# Patient Record
Sex: FEMALE | Race: OTHER | HISPANIC OR LATINO | Employment: FULL TIME | URBAN - METROPOLITAN AREA
[De-identification: names, ages, dates, MRNs, and addresses within clinical notes are randomized per-mention and may not be internally consistent; named-entity substitution may affect disease eponyms.]

---

## 2017-04-19 ENCOUNTER — ALLSCRIPTS OFFICE VISIT (OUTPATIENT)
Dept: OTHER | Facility: OTHER | Age: 47
End: 2017-04-19

## 2017-05-26 ENCOUNTER — ALLSCRIPTS OFFICE VISIT (OUTPATIENT)
Dept: OTHER | Facility: OTHER | Age: 47
End: 2017-05-26

## 2017-06-25 ENCOUNTER — APPOINTMENT (EMERGENCY)
Dept: RADIOLOGY | Facility: HOSPITAL | Age: 47
End: 2017-06-25
Payer: COMMERCIAL

## 2017-06-25 ENCOUNTER — HOSPITAL ENCOUNTER (EMERGENCY)
Facility: HOSPITAL | Age: 47
Discharge: HOME | End: 2017-06-25
Attending: EMERGENCY MEDICINE | Admitting: EMERGENCY MEDICINE
Payer: COMMERCIAL

## 2017-06-25 VITALS
RESPIRATION RATE: 16 BRPM | DIASTOLIC BLOOD PRESSURE: 95 MMHG | TEMPERATURE: 98.5 F | HEART RATE: 80 BPM | HEIGHT: 63 IN | OXYGEN SATURATION: 100 % | SYSTOLIC BLOOD PRESSURE: 131 MMHG | BODY MASS INDEX: 30.74 KG/M2 | WEIGHT: 173.5 LBS

## 2017-06-25 DIAGNOSIS — S80.01XA CONTUSION OF RIGHT KNEE, INITIAL ENCOUNTER: ICD-10-CM

## 2017-06-25 DIAGNOSIS — S89.91XA RIGHT KNEE INJURY, INITIAL ENCOUNTER: Primary | ICD-10-CM

## 2017-06-25 PROCEDURE — 99283 EMERGENCY DEPT VISIT LOW MDM: CPT

## 2017-06-25 PROCEDURE — 73564 X-RAY EXAM KNEE 4 OR MORE: CPT

## 2017-06-25 RX ORDER — HYDROCODONE BITARTRATE AND ACETAMINOPHEN 5; 325 MG/1; MG/1
1 TABLET ORAL EVERY 6 HOURS PRN
Qty: 20 TABLET | Refills: 0 | Status: SHIPPED | OUTPATIENT
Start: 2017-06-25 | End: 2017-06-30

## 2017-06-25 RX ORDER — ACETAMINOPHEN 325 MG/1
650 TABLET ORAL EVERY 6 HOURS PRN
COMMUNITY
End: 2019-02-01

## 2017-06-25 RX ORDER — NAPROXEN 250 MG/1
250 TABLET ORAL 2 TIMES DAILY WITH MEALS
COMMUNITY
End: 2019-02-01 | Stop reason: ALTCHOICE

## 2017-06-25 RX ORDER — HYDROCODONE BITARTRATE AND ACETAMINOPHEN 5; 325 MG/1; MG/1
1 TABLET ORAL ONCE
Status: COMPLETED | OUTPATIENT
Start: 2017-06-25 | End: 2017-06-25

## 2017-06-25 RX ADMIN — HYDROCODONE BITARTRATE AND ACETAMINOPHEN 1 TABLET: 5; 325 TABLET ORAL at 02:17

## 2017-07-20 ENCOUNTER — ALLSCRIPTS OFFICE VISIT (OUTPATIENT)
Dept: OTHER | Facility: OTHER | Age: 47
End: 2017-07-20

## 2017-07-20 DIAGNOSIS — M17.11 PRIMARY OSTEOARTHRITIS OF RIGHT KNEE: ICD-10-CM

## 2017-07-28 ENCOUNTER — APPOINTMENT (OUTPATIENT)
Dept: PHYSICAL THERAPY | Age: 47
End: 2017-07-28
Payer: COMMERCIAL

## 2017-07-28 ENCOUNTER — GENERIC CONVERSION - ENCOUNTER (OUTPATIENT)
Dept: OTHER | Facility: OTHER | Age: 47
End: 2017-07-28

## 2017-07-28 DIAGNOSIS — M17.11 PRIMARY OSTEOARTHRITIS OF RIGHT KNEE: ICD-10-CM

## 2017-07-28 PROCEDURE — 97162 PT EVAL MOD COMPLEX 30 MIN: CPT

## 2017-08-04 ENCOUNTER — APPOINTMENT (OUTPATIENT)
Dept: PHYSICAL THERAPY | Age: 47
End: 2017-08-04
Payer: COMMERCIAL

## 2017-08-04 PROCEDURE — 97110 THERAPEUTIC EXERCISES: CPT

## 2017-08-10 ENCOUNTER — APPOINTMENT (OUTPATIENT)
Dept: PHYSICAL THERAPY | Age: 47
End: 2017-08-10
Payer: COMMERCIAL

## 2017-08-10 PROCEDURE — 97035 APP MDLTY 1+ULTRASOUND EA 15: CPT

## 2017-08-10 PROCEDURE — 97530 THERAPEUTIC ACTIVITIES: CPT

## 2017-08-10 PROCEDURE — 97110 THERAPEUTIC EXERCISES: CPT

## 2017-08-11 ENCOUNTER — APPOINTMENT (OUTPATIENT)
Dept: PHYSICAL THERAPY | Age: 47
End: 2017-08-11
Payer: COMMERCIAL

## 2017-08-11 PROCEDURE — 97110 THERAPEUTIC EXERCISES: CPT

## 2017-08-11 PROCEDURE — 97530 THERAPEUTIC ACTIVITIES: CPT

## 2017-08-17 ENCOUNTER — APPOINTMENT (OUTPATIENT)
Dept: PHYSICAL THERAPY | Age: 47
End: 2017-08-17
Payer: COMMERCIAL

## 2017-08-17 PROCEDURE — 97110 THERAPEUTIC EXERCISES: CPT

## 2017-08-18 ENCOUNTER — APPOINTMENT (OUTPATIENT)
Dept: PHYSICAL THERAPY | Age: 47
End: 2017-08-18
Payer: COMMERCIAL

## 2017-08-18 PROCEDURE — 97530 THERAPEUTIC ACTIVITIES: CPT

## 2017-08-18 PROCEDURE — 97110 THERAPEUTIC EXERCISES: CPT

## 2017-08-24 ENCOUNTER — APPOINTMENT (OUTPATIENT)
Dept: PHYSICAL THERAPY | Age: 47
End: 2017-08-24
Payer: COMMERCIAL

## 2017-08-24 PROCEDURE — 97140 MANUAL THERAPY 1/> REGIONS: CPT

## 2017-08-24 PROCEDURE — 97110 THERAPEUTIC EXERCISES: CPT

## 2017-08-25 ENCOUNTER — APPOINTMENT (OUTPATIENT)
Dept: PHYSICAL THERAPY | Age: 47
End: 2017-08-25
Payer: COMMERCIAL

## 2017-08-25 PROCEDURE — 97530 THERAPEUTIC ACTIVITIES: CPT

## 2017-08-25 PROCEDURE — 97110 THERAPEUTIC EXERCISES: CPT

## 2017-08-31 ENCOUNTER — APPOINTMENT (OUTPATIENT)
Dept: PHYSICAL THERAPY | Age: 47
End: 2017-08-31
Payer: COMMERCIAL

## 2017-08-31 PROCEDURE — 97140 MANUAL THERAPY 1/> REGIONS: CPT

## 2017-08-31 PROCEDURE — 97110 THERAPEUTIC EXERCISES: CPT

## 2017-09-01 ENCOUNTER — APPOINTMENT (OUTPATIENT)
Dept: PHYSICAL THERAPY | Age: 47
End: 2017-09-01
Payer: COMMERCIAL

## 2017-09-07 ENCOUNTER — APPOINTMENT (OUTPATIENT)
Dept: PHYSICAL THERAPY | Age: 47
End: 2017-09-07
Payer: COMMERCIAL

## 2017-09-07 PROCEDURE — 97110 THERAPEUTIC EXERCISES: CPT

## 2017-09-07 PROCEDURE — G8991 OTHER PT/OT GOAL STATUS: HCPCS

## 2017-09-07 PROCEDURE — G8990 OTHER PT/OT CURRENT STATUS: HCPCS

## 2017-09-08 ENCOUNTER — APPOINTMENT (OUTPATIENT)
Dept: PHYSICAL THERAPY | Age: 47
End: 2017-09-08
Payer: COMMERCIAL

## 2017-10-26 ENCOUNTER — ALLSCRIPTS OFFICE VISIT (OUTPATIENT)
Dept: OTHER | Facility: OTHER | Age: 47
End: 2017-10-26

## 2018-01-12 VITALS
HEART RATE: 70 BPM | WEIGHT: 174 LBS | SYSTOLIC BLOOD PRESSURE: 137 MMHG | HEIGHT: 62 IN | DIASTOLIC BLOOD PRESSURE: 83 MMHG | BODY MASS INDEX: 32.02 KG/M2

## 2018-01-12 NOTE — MISCELLANEOUS
Message  Return to work or school:   Christina Rubio is under my professional care  She was seen in my office on 10/26/17   She is able to return to work on  Weight Bearing Status: Full Weight-Bearing  Bro Lee  Signatures   Electronically signed by : Leopoldo Lint , M D ; Oct 31 2017  1:08PM EST                       (Author)    Electronically signed by : Bro Lee, [facility]; Nov 23 2017  9:29AM EST                       (Author)    Electronically signed by :  Leopoldo Lint , M D ; Nov 28 2017  2:12PM EST                       (Author)

## 2018-01-13 VITALS
SYSTOLIC BLOOD PRESSURE: 126 MMHG | OXYGEN SATURATION: 94 % | DIASTOLIC BLOOD PRESSURE: 76 MMHG | BODY MASS INDEX: 32.2 KG/M2 | RESPIRATION RATE: 18 BRPM | WEIGHT: 175 LBS | HEART RATE: 91 BPM | TEMPERATURE: 98.6 F | HEIGHT: 62 IN

## 2018-01-13 VITALS — HEIGHT: 62 IN | DIASTOLIC BLOOD PRESSURE: 88 MMHG | SYSTOLIC BLOOD PRESSURE: 134 MMHG | HEART RATE: 70 BPM

## 2018-01-14 VITALS
SYSTOLIC BLOOD PRESSURE: 110 MMHG | TEMPERATURE: 98.6 F | WEIGHT: 169.38 LBS | HEART RATE: 100 BPM | BODY MASS INDEX: 31.17 KG/M2 | OXYGEN SATURATION: 96 % | DIASTOLIC BLOOD PRESSURE: 72 MMHG | HEIGHT: 62 IN

## 2018-01-18 NOTE — MISCELLANEOUS
Signatures   Electronically signed by : KATHY Porter ; Sep  1 2016  8:27AM EST                       (Author)

## 2018-01-18 NOTE — MISCELLANEOUS
Message  Return to work or school:   Stephanie Benitez is under my professional care  She was seen in my office on 08/18/2016   Please excuse from work 8/19/16  Signatures   Electronically signed by : ROSHNI Beasley; Aug 18 2016  5:50PM EST                       (Author)    Electronically signed by : Oscar Lazaro, [facility];  Aug 19 2016  7:30PM EST

## 2018-03-15 ENCOUNTER — OFFICE VISIT (OUTPATIENT)
Dept: OBGYN CLINIC | Facility: HOSPITAL | Age: 48
End: 2018-03-15
Payer: COMMERCIAL

## 2018-03-15 VITALS
HEIGHT: 62 IN | HEART RATE: 83 BPM | BODY MASS INDEX: 31.28 KG/M2 | WEIGHT: 170 LBS | DIASTOLIC BLOOD PRESSURE: 86 MMHG | SYSTOLIC BLOOD PRESSURE: 134 MMHG

## 2018-03-15 DIAGNOSIS — M17.11 PRIMARY OSTEOARTHRITIS OF RIGHT KNEE: Primary | ICD-10-CM

## 2018-03-15 PROCEDURE — 20610 DRAIN/INJ JOINT/BURSA W/O US: CPT | Performed by: PHYSICIAN ASSISTANT

## 2018-03-15 PROCEDURE — 99213 OFFICE O/P EST LOW 20 MIN: CPT | Performed by: PHYSICIAN ASSISTANT

## 2018-03-15 RX ORDER — LIDOCAINE HYDROCHLORIDE 10 MG/ML
2 INJECTION, SOLUTION INFILTRATION; PERINEURAL
Status: COMPLETED | OUTPATIENT
Start: 2018-03-15 | End: 2018-03-15

## 2018-03-15 RX ORDER — BETAMETHASONE SODIUM PHOSPHATE AND BETAMETHASONE ACETATE 3; 3 MG/ML; MG/ML
6 INJECTION, SUSPENSION INTRA-ARTICULAR; INTRALESIONAL; INTRAMUSCULAR; SOFT TISSUE
Status: COMPLETED | OUTPATIENT
Start: 2018-03-15 | End: 2018-03-15

## 2018-03-15 RX ORDER — BUPIVACAINE HYDROCHLORIDE 2.5 MG/ML
2 INJECTION, SOLUTION INFILTRATION; PERINEURAL
Status: COMPLETED | OUTPATIENT
Start: 2018-03-15 | End: 2018-03-15

## 2018-03-15 RX ADMIN — BUPIVACAINE HYDROCHLORIDE 2 ML: 2.5 INJECTION, SOLUTION INFILTRATION; PERINEURAL at 17:58

## 2018-03-15 RX ADMIN — BETAMETHASONE SODIUM PHOSPHATE AND BETAMETHASONE ACETATE 6 MG: 3; 3 INJECTION, SUSPENSION INTRA-ARTICULAR; INTRALESIONAL; INTRAMUSCULAR; SOFT TISSUE at 17:58

## 2018-03-15 RX ADMIN — LIDOCAINE HYDROCHLORIDE 2 ML: 10 INJECTION, SOLUTION INFILTRATION; PERINEURAL at 17:58

## 2018-03-15 NOTE — PROGRESS NOTES
49 y o female presenting to the office for follow-up on osteoarthritis of the right knee  Patient states that the pain is an 8 and a 10 at this time  She states that it does not wake her up at night, however, it does limit her activities  Patient denies any numbness/tingling or any other acute/associated complaints  Patient's last injection was in October and she stated that it provided her with significant pain relief  Review of Systems  Review of systems negative unless otherwise specified in HPI    Past Medical History  No past medical history on file  Past Surgical History  No past surgical history on file  Current Medications  Current Outpatient Prescriptions on File Prior to Visit   Medication Sig Dispense Refill    acetaminophen (TYLENOL) 325 mg tablet Take 650 mg by mouth every 6 (six) hours as needed for mild pain      naproxen (NAPROSYN) 250 mg tablet Take 250 mg by mouth 2 (two) times a day with meals       No current facility-administered medications on file prior to visit          Recent Labs (HCT,HGB,PT,INR,ESR,CRP,GLU,HgA1C)  No results found for: HCT, HGB, WBC, PT, INR, ESR, CRP, GLUCOSE, HGBA1C      Physical exam  · General: Awake, Alert, Oriented  · Eyes: Pupils equal, round and reactive to light  · Heart: regular rate and rhythm  · Lungs: No audible wheezing  · Abdomen: soft  right Knee exam  · Tender to palpation diffusely at the anterior knee  · Range of motion 0-140  · Stable to varus valgus stress  · Strength 5/5 to flexion extension  · Sensation intact      Imaging  None needed    Procedure  Large joint arthrocentesis  Date/Time: 3/15/2018 5:58 PM  Consent given by: patient  Timeout: Immediately prior to procedure a time out was called to verify the correct patient, procedure, equipment, support staff and site/side marked as required   Supporting Documentation  Indications: pain   Procedure Details  Location: knee - R knee  Preparation: Patient was prepped and draped in the usual sterile fashion  Needle size: 22 G  Ultrasound guidance: no  Approach: anterolateral  Medications administered: 2 mL bupivacaine 0 25 %; 2 mL lidocaine 1 %; 6 mg betamethasone acetate-betamethasone sodium phosphate 6 (3-3) mg/mL    Patient tolerance: patient tolerated the procedure well with no immediate complications  Dressing:  Sterile dressing applied      Assessment/Plan:   49 y  o female without osteoarthritis of the right knee  The patient can continue with regular daily activities  Patient can get corticosteroid injections Q 3 months as needed  Patient follow-up in 3 months if needed  Patient to call office or return sooner with any other concerns or complaints

## 2018-05-30 ENCOUNTER — OFFICE VISIT (OUTPATIENT)
Dept: URGENT CARE | Age: 48
End: 2018-05-30
Payer: COMMERCIAL

## 2018-05-30 VITALS
HEART RATE: 96 BPM | BODY MASS INDEX: 31.32 KG/M2 | RESPIRATION RATE: 18 BRPM | OXYGEN SATURATION: 95 % | TEMPERATURE: 98 F | DIASTOLIC BLOOD PRESSURE: 60 MMHG | HEIGHT: 62 IN | SYSTOLIC BLOOD PRESSURE: 120 MMHG | WEIGHT: 170.2 LBS

## 2018-05-30 DIAGNOSIS — T78.40XA ALLERGIC REACTION, INITIAL ENCOUNTER: Primary | ICD-10-CM

## 2018-05-30 PROCEDURE — G0382 LEV 3 HOSP TYPE B ED VISIT: HCPCS | Performed by: FAMILY MEDICINE

## 2018-05-30 RX ORDER — METRONIDAZOLE 500 MG/1
500 TABLET ORAL EVERY 12 HOURS SCHEDULED
COMMUNITY
End: 2019-03-16 | Stop reason: ALTCHOICE

## 2018-05-30 RX ORDER — CHLORAL HYDRATE 500 MG
1 CAPSULE ORAL DAILY
COMMUNITY

## 2018-05-30 RX ORDER — BIOTIN 10000 MCG
1 CAPSULE ORAL DAILY
COMMUNITY

## 2018-05-30 RX ORDER — PHENTERMINE HYDROCHLORIDE 37.5 MG/1
37.5 CAPSULE ORAL EVERY MORNING
COMMUNITY
End: 2019-05-16 | Stop reason: ALTCHOICE

## 2018-05-30 RX ORDER — METHYLPREDNISOLONE 4 MG/1
TABLET ORAL
Qty: 21 TABLET | Refills: 0 | Status: SHIPPED | OUTPATIENT
Start: 2018-05-30 | End: 2019-02-17 | Stop reason: ALTCHOICE

## 2018-05-30 NOTE — LETTER
May 30, 2018     Patient: Sukumar Carrasco   YOB: 1970   Date of Visit: 5/30/2018       To Whom It May Concern: It is my medical opinion that Kenneth Montalvo may return to work on 05/31/2018  If you have any questions or concerns, please don't hesitate to call           Sincerely,        [facility] [address]    CC: No Recipients

## 2018-05-30 NOTE — PATIENT INSTRUCTIONS
As we discussed, this may be allergic reaction caused by Metronidazole or Trokendi as these are 2 new medication  Metronidazole was started less than 1 week ago  Trokendi was started just over 2 weeks ago  I would recommend you discuss this with prescribing providers  Stop these two medications for now  Start steroid taper  Start Benadryl  Avoid scratching the areas  Go to ER with worsening symptoms, SOB or difficulty breathing  [place] comentamos, esta puede ser reece reacción alérgica causada por Metronidazol o Trokendi ya que estos son 2 medicamentos nuevos  El metronidazol se inició hace menos de 1 semana  Trokendi se inició hace más de 2 semanas  Le recomendaría que discuta esto con los proveedores que prescriben  [address] medicamentos por ahora  Comience con la reducción de esteroides  Comience Benadryl  Evite rayar las áreas  Vaya a urgencias con síntomas de empeoramiento, SOB o dificultad para respirar

## 2018-05-31 NOTE — PROGRESS NOTES
[facility]        NAME: Pavel Stephenson is a 49 y o  female  : 1970    MRN: [id number]  DATE: May 31, 2018  TIME: 10:05 AM    Assessment and Plan   Allergic reaction, initial encounter Angelito Nuñez  Allergic reaction, initial encounter  methylprednisolone (MEDROL) 4 mg tablet         Patient Instructions     Patient Instructions   As we discussed, this may be allergic reaction caused by Metronidazole or Trokendi as these are 2 new medication  Metronidazole was started less than 1 week ago  Trokendi was started just over 2 weeks ago  I would recommend you discuss this with prescribing providers  Stop these two medications for now  Start steroid taper  Start Benadryl  Avoid scratching the areas  Go to ER with worsening symptoms, SOB or difficulty breathing  [place] comentamos, esta puede ser reece reacción alérgica causada por Metronidazol o Trokendi ya que estos son 2 medicamentos nuevos  El metronidazol se inició hace menos de 1 semana  Trokendi se inició hace más de 2 semanas  Le recomendaría que discuta esto con los proveedores que prescriben  [address] medicamentos por ahora  Comience con la reducción de esteroides  Comience Benadryl  Evite rayar las áreas  Vaya a urgencias con síntomas de empeoramiento, SOB o dificultad para respirar  Chief Complaint     Chief Complaint   Patient presents with    Rash     Awoke with rash on torso and extremities today - very itchy  Started new meds - Metronidazole x 1 week and Trokendi and Phentermine x 2 weeks (for weight loss)  History of Present Illness   Pavel Stephenson presents to the clinic c/o    This is a 49year old female here today with rash  She states rash started today upon waking  She noticed red itchy rash on her extremities and torso  She had rash like this before when she was exposed to plant  She was started on Flagyl about 1 week ago  She has had 11 or 14 doses    She was also started on Trokendi and Phentermine about 18 days ago for weight loss  She states she has taken Phentermine in the past   She also used a new deodorant yesterday  She has not tried Benadryl for itching  No other new foods, lotions, detergents or soaps  No SOB, difficulty breathing, tongue or lip swelling, no difficulty swallowing  Review of Systems   Review of Systems   Constitutional: Negative  Respiratory: Negative  Cardiovascular: Negative  Skin: Positive for rash  Neurological: Negative  Psychiatric/Behavioral: Negative  Current Medications     Long-Term Prescriptions   Medication Sig Dispense Refill    Biotin 10 MG CAPS Take 1 capsule by mouth daily      naproxen (NAPROSYN) 250 mg tablet Take 250 mg by mouth 2 (two) times a day with meals      Omega-3 1000 MG CAPS Take 1 capsule by mouth daily      phentermine 37 5 MG capsule Take 37 5 mg by mouth every morning      Topiramate ER (TROKENDI XR) 25 MG CP24 1 capsule daily         Current Allergies     Allergies as of 05/30/2018 - Reviewed 05/30/2018   Allergen Reaction Noted    Penicillins Anaphylaxis 06/25/2017            The following portions of the patient's history were reviewed and updated as appropriate: allergies, current medications, past family history, past medical history, past social history, past surgical history and problem list     Objective   /60 (BP Location: Right arm, Patient Position: Sitting, Cuff Size: Standard)   Pulse 96   Temp 98 °F (36 7 °C) (Oral)   Resp 18   Ht 5' 2" (1 575 m)   Wt 77 2 kg (170 lb 3 2 oz)   SpO2 95%   BMI 31 13 kg/m²        Physical Exam     Physical Exam   Constitutional: She is oriented to person, place, and time  She appears well-developed and well-nourished  Neck: Normal range of motion  Neck supple  Cardiovascular: Normal rate, regular rhythm and normal heart sounds  Pulmonary/Chest: Effort normal and breath sounds normal    Abdominal: Soft   Bowel sounds are normal  Musculoskeletal: Normal range of motion  Neurological: She is alert and oriented to person, place, and time  Skin:   Scattered erythemic papular rash scattered on arms, legs, lower torso  Psychiatric: She has a normal mood and affect  Her behavior is normal    Nursing note and vitals reviewed

## 2018-06-12 ENCOUNTER — OFFICE VISIT (OUTPATIENT)
Dept: URGENT CARE | Age: 48
End: 2018-06-12
Payer: COMMERCIAL

## 2018-06-12 VITALS
OXYGEN SATURATION: 97 % | HEIGHT: 62 IN | BODY MASS INDEX: 30.18 KG/M2 | HEART RATE: 91 BPM | WEIGHT: 164 LBS | DIASTOLIC BLOOD PRESSURE: 65 MMHG | RESPIRATION RATE: 20 BRPM | TEMPERATURE: 98.4 F | SYSTOLIC BLOOD PRESSURE: 124 MMHG

## 2018-06-12 DIAGNOSIS — H60.393 OTHER INFECTIVE ACUTE OTITIS EXTERNA OF BOTH EARS: Primary | ICD-10-CM

## 2018-06-12 PROCEDURE — G0382 LEV 3 HOSP TYPE B ED VISIT: HCPCS | Performed by: FAMILY MEDICINE

## 2018-06-12 NOTE — PROGRESS NOTES
[facility]        NAME: Sandy Nguyen is a 49 y o  female  : 1970    MRN: [id number]  DATE: 2018  TIME: 7:01 PM    Assessment and Plan   Other infective acute otitis externa of both ears [H60 393]  1  Other infective acute otitis externa of both ears  neomycin-polymyxin-hydrocortisone (CORTISPORIN) otic solution    DISCONTINUED: neomycin-polymyxin-hydrocortisone (CORTISPORIN) otic solution       Patient Instructions   Use antibiotic drops as directed for ten days  Do not use any Q-tips or other products in the ears  After resolution of infection you can use Debrox drops as directed to prevent ear wax build up  Follow up with your family doctor in 3-5 days  Proceed to the ER if symptoms worsen  Use gotas antibióticas según lo indicado bradley paula días  No use ningún Q-tips u otros productos en los oídos  Después de la resolución de la infección, puede usar las gotas de Debrox ivck se indica para evitar la acumulación de cera en el oído  An un seguimiento con castro médico de mel en 3-5 días  Proceda a la dena de emergencias si los síntomas empeoran  Translation made using [employer] translate  Instructions were reviewed by Akbar Lucero, who confirmed they were the same as [place] instructions above  Akbar Lucero use for translation during appt  Chief Complaint     Chief Complaint   Patient presents with    Earache     bilateral ear pain and some drainage color clear and bloody for past 1 week   History of Present Illness       71-year-old Nauruan speaking female presenting with complaint of bilateral ear pain x1 week  Two days ago she noted a yellow colored and bloody discharge from both ears worse with lying down  Prior to onset of pain she did experience a sensation of ears being clogged and attempted to use Q-Tips to remove any wax with no relief  No other treatments tried  She denies any swimming or water in her ears for prolonged period of time     No change in hearing  No ringing in the ears, headache, or dizziness  Review of Systems   Review of Systems   Constitutional: Negative for chills and fever  HENT: Positive for ear discharge and ear pain  Negative for congestion, hearing loss, rhinorrhea, sore throat and tinnitus  Skin: Negative for rash  Current Medications       Current Outpatient Prescriptions:     acetaminophen (TYLENOL) 325 mg tablet, Take 650 mg by mouth every 6 (six) hours as needed for mild pain, Disp: , Rfl:     Biotin 10 MG CAPS, Take 1 capsule by mouth daily, Disp: , Rfl:     methylprednisolone (MEDROL) 4 mg tablet, Medrol dose pack Take as directed , Disp: 21 tablet, Rfl: 0    metroNIDAZOLE (FLAGYL) 500 mg tablet, Take 500 mg by mouth every 12 (twelve) hours, Disp: , Rfl:     naproxen (NAPROSYN) 250 mg tablet, Take 250 mg by mouth 2 (two) times a day with meals, Disp: , Rfl:     Omega-3 1000 MG CAPS, Take 1 capsule by mouth daily, Disp: , Rfl:     phentermine 37 5 MG capsule, Take 37 5 mg by mouth every morning, Disp: , Rfl:     Topiramate ER (TROKENDI XR) 25 MG CP24, 1 capsule daily, Disp: , Rfl:     neomycin-polymyxin-hydrocortisone (CORTISPORIN) otic solution, Administer 4 drops into both ears every 6 (six) hours for 10 days, Disp: 10 mL, Rfl: 0    Current Allergies     Allergies as of 06/12/2018 - Reviewed 06/12/2018   Allergen Reaction Noted    Penicillins Anaphylaxis 06/25/2017            The following portions of the patient's history were reviewed and updated as appropriate: allergies, current medications, past family history, past medical history, past social history, past surgical history and problem list      Past Medical History:   Diagnosis Date    Arthritis     Bacterial vaginosis     Obesity        History reviewed  No pertinent surgical history  History reviewed  No pertinent family history  Medications have been verified          Objective   /65   Pulse 91   Temp 98 4 °F (36 9 °C) (Temporal)   Resp 20   Ht 5' 2" (1 575 m)   Wt 74 4 kg (164 lb)   SpO2 97%   BMI 30 00 kg/m²      Physical Exam     Physical Exam   Constitutional: She is oriented to person, place, and time  She appears well-developed and well-nourished  No distress  HENT:   Head: Normocephalic and atraumatic  Right Ear: Hearing and external ear normal  A middle ear effusion is present  Left Ear: Hearing, tympanic membrane and ear canal normal    Bilateral canals appear erythematous and macerated, with some skin flaking  Purulent yellow drainage visible at the meatus of the canal  TMs intact with no visible perforation  Pinna NTT tugging  Eyes: Conjunctivae are normal    Cardiovascular: Normal rate, regular rhythm and normal heart sounds  Pulmonary/Chest: Effort normal and breath sounds normal  No respiratory distress  She has no decreased breath sounds  She has no wheezes  She has no rhonchi  She has no rales  Neurological: She is alert and oriented to person, place, and time  No cranial nerve deficit  She exhibits normal muscle tone  Coordination normal    Skin: Skin is warm and dry  No rash noted  She is not diaphoretic  Psychiatric: She has a normal mood and affect  Her behavior is normal    Nursing note and vitals reviewed

## 2018-06-12 NOTE — PATIENT INSTRUCTIONS
Use antibiotic drops as directed for ten days  Do not use any Q-tips or other products in the ears  After resolution of infection you can use Debrox drops as directed to prevent ear wax build up  Follow up with your family doctor in 3-5 days  Proceed to the ER if symptoms worsen  Use gotas antibióticas según lo indicado bradley paula días  No use ningún Q-tips u otros productos en los oídos  Después de la resolución de la infección, puede usar las gotas de Debrox vick se indica para evitar la acumulación de cera en el oído  An un seguimiento con castro médico de mel en 3-5 días  Proceda a la dena de emergencias si los síntomas empeoran

## 2018-10-26 ENCOUNTER — TRANSCRIBE ORDERS (OUTPATIENT)
Dept: ADMINISTRATIVE | Facility: HOSPITAL | Age: 48
End: 2018-10-26

## 2018-10-26 ENCOUNTER — OFFICE VISIT (OUTPATIENT)
Dept: OBGYN CLINIC | Facility: HOSPITAL | Age: 48
End: 2018-10-26
Payer: COMMERCIAL

## 2018-10-26 VITALS
SYSTOLIC BLOOD PRESSURE: 150 MMHG | WEIGHT: 164 LBS | HEIGHT: 62 IN | DIASTOLIC BLOOD PRESSURE: 93 MMHG | BODY MASS INDEX: 30.18 KG/M2 | HEART RATE: 103 BPM

## 2018-10-26 DIAGNOSIS — Z12.39 BREAST SCREENING: Primary | ICD-10-CM

## 2018-10-26 DIAGNOSIS — M17.11 PRIMARY OSTEOARTHRITIS OF RIGHT KNEE: Primary | ICD-10-CM

## 2018-10-26 PROCEDURE — 99213 OFFICE O/P EST LOW 20 MIN: CPT | Performed by: PHYSICIAN ASSISTANT

## 2018-10-26 PROCEDURE — 20610 DRAIN/INJ JOINT/BURSA W/O US: CPT | Performed by: PHYSICIAN ASSISTANT

## 2018-10-26 RX ADMIN — BUPIVACAINE HYDROCHLORIDE 2 ML: 2.5 INJECTION, SOLUTION INFILTRATION; PERINEURAL at 09:10

## 2018-10-26 RX ADMIN — BETAMETHASONE SODIUM PHOSPHATE AND BETAMETHASONE ACETATE 6 MG: 3; 3 INJECTION, SUSPENSION INTRA-ARTICULAR; INTRALESIONAL; INTRAMUSCULAR; SOFT TISSUE at 09:10

## 2018-10-26 RX ADMIN — LIDOCAINE HYDROCHLORIDE 2 ML: 10 INJECTION, SOLUTION INFILTRATION; PERINEURAL at 09:10

## 2018-11-19 RX ORDER — BETAMETHASONE SODIUM PHOSPHATE AND BETAMETHASONE ACETATE 3; 3 MG/ML; MG/ML
6 INJECTION, SUSPENSION INTRA-ARTICULAR; INTRALESIONAL; INTRAMUSCULAR; SOFT TISSUE
Status: COMPLETED | OUTPATIENT
Start: 2018-10-26 | End: 2018-10-26

## 2018-11-19 RX ORDER — LIDOCAINE HYDROCHLORIDE 10 MG/ML
2 INJECTION, SOLUTION INFILTRATION; PERINEURAL
Status: COMPLETED | OUTPATIENT
Start: 2018-10-26 | End: 2018-10-26

## 2018-11-19 RX ORDER — BUPIVACAINE HYDROCHLORIDE 2.5 MG/ML
2 INJECTION, SOLUTION INFILTRATION; PERINEURAL
Status: COMPLETED | OUTPATIENT
Start: 2018-10-26 | End: 2018-10-26

## 2018-11-23 ENCOUNTER — HOSPITAL ENCOUNTER (OUTPATIENT)
Dept: RADIOLOGY | Age: 48
Discharge: HOME | End: 2018-11-23
Payer: COMMERCIAL

## 2018-11-23 VITALS — BODY MASS INDEX: 26.46 KG/M2 | WEIGHT: 155 LBS | HEIGHT: 64 IN

## 2018-11-23 DIAGNOSIS — Z12.39 BREAST SCREENING: ICD-10-CM

## 2018-11-23 PROCEDURE — 77067 SCR MAMMO BI INCL CAD: CPT

## 2019-02-01 ENCOUNTER — OFFICE VISIT (OUTPATIENT)
Dept: OBGYN CLINIC | Facility: HOSPITAL | Age: 49
End: 2019-02-01
Payer: COMMERCIAL

## 2019-02-01 VITALS
SYSTOLIC BLOOD PRESSURE: 146 MMHG | WEIGHT: 160 LBS | DIASTOLIC BLOOD PRESSURE: 90 MMHG | HEIGHT: 62 IN | HEART RATE: 92 BPM | BODY MASS INDEX: 29.44 KG/M2

## 2019-02-01 DIAGNOSIS — M17.11 PRIMARY OSTEOARTHRITIS OF RIGHT KNEE: Primary | ICD-10-CM

## 2019-02-01 PROCEDURE — 20610 DRAIN/INJ JOINT/BURSA W/O US: CPT | Performed by: PHYSICIAN ASSISTANT

## 2019-02-01 PROCEDURE — 99213 OFFICE O/P EST LOW 20 MIN: CPT | Performed by: PHYSICIAN ASSISTANT

## 2019-02-01 RX ORDER — SENNOSIDES 8.6 MG
1300 CAPSULE ORAL 2 TIMES DAILY
Qty: 120 TABLET | Refills: 0 | Status: SHIPPED | OUTPATIENT
Start: 2019-02-01 | End: 2019-03-03

## 2019-02-01 RX ORDER — MELOXICAM 15 MG/1
15 TABLET ORAL DAILY
Qty: 30 TABLET | Refills: 3 | Status: SHIPPED | OUTPATIENT
Start: 2019-02-01 | End: 2019-05-16 | Stop reason: ALTCHOICE

## 2019-02-01 RX ORDER — SULFAMETHOXAZOLE AND TRIMETHOPRIM 800; 160 MG/1; MG/1
1 TABLET ORAL 2 TIMES DAILY
Refills: 0 | COMMUNITY
Start: 2019-01-25 | End: 2019-02-17 | Stop reason: ALTCHOICE

## 2019-02-01 RX ADMIN — BETAMETHASONE SODIUM PHOSPHATE AND BETAMETHASONE ACETATE 6 MG: 3; 3 INJECTION, SUSPENSION INTRA-ARTICULAR; INTRALESIONAL; INTRAMUSCULAR; SOFT TISSUE at 09:00

## 2019-02-01 RX ADMIN — LIDOCAINE HYDROCHLORIDE 1 ML: 20 INJECTION, SOLUTION INFILTRATION; PERINEURAL at 09:00

## 2019-02-01 RX ADMIN — BUPIVACAINE HYDROCHLORIDE 2 ML: 2.5 INJECTION, SOLUTION INFILTRATION; PERINEURAL at 09:00

## 2019-02-01 NOTE — PROGRESS NOTES
51 y o  female presenting to the office for follow up of right knee pain  Patient describes the pain as aching which is continuous  Patient state that the pain gets worse with activities  Patient has tried corticosteroid injections with improvement  She states that she got pain relief for close to 3 months, but now is in a lot of pain  Patient does not have locking, catching, or feelings of instability  Patient states that the pain does keep her up at night  Patient does have functional limitations preventing her from doing regular daily activities  ROS  Review of Systems   Constitutional: Negative for fever and unexpected weight change  HENT: Negative for hearing loss, nosebleeds and sore throat  Eyes: Negative for pain, redness and visual disturbance  Respiratory: Negative for cough, shortness of breath and wheezing  Cardiovascular: Negative for chest pain, palpitations and leg swelling  Gastrointestinal: Negative for abdominal pain, nausea and vomiting  Endocrine: Negative for polydipsia and polyuria  Genitourinary: Negative for dysuria and hematuria  Skin: Negative for rash and wound  Neurological: Negative for dizziness and headaches  Psychiatric/Behavioral: Negative for agitation and suicidal ideas  Past Medical History:   Diagnosis Date    Arthritis     Bacterial vaginosis     Obesity      History reviewed  No pertinent surgical history  Results Reviewed     None          Physical Exam  Physical Exam   Constitutional: She is oriented to person, place, and time  She appears well-developed and well-nourished  HENT:   Head: Normocephalic and atraumatic  Eyes: Pupils are equal, round, and reactive to light  EOM are normal    Neck: Neck supple  No tracheal deviation present  Cardiovascular: Normal rate and regular rhythm  Pulmonary/Chest: Effort normal and breath sounds normal    Abdominal: There is no guarding     Musculoskeletal:        Left knee: She exhibits no effusion  Neurological: She is alert and oriented to person, place, and time  Skin: Skin is warm and dry  Psychiatric: She has a normal mood and affect  Her behavior is normal      Left Knee Exam     Tenderness   The patient is experiencing tenderness in the lateral joint line and medial joint line  Range of Motion   Extension: -5   Flexion: 120     Muscle Strength     The patient has normal left knee strength  Other   Erythema: absent  Sensation: normal  Swelling: none  Effusion: no effusion present        Procedures  Large joint arthrocentesis  Date/Time: 2/1/2019 9:00 AM  Consent given by: patient  Timeout: Immediately prior to procedure a time out was called to verify the correct patient, procedure, equipment, support staff and site/side marked as required   Supporting Documentation  Indications: pain   Procedure Details  Location: knee - R knee  Preparation: Patient was prepped and draped in the usual sterile fashion  Needle size: 22 G  Ultrasound guidance: no  Approach: anterolateral  Medications administered: 2 mL bupivacaine 0 25 %; 6 mg betamethasone acetate-betamethasone sodium phosphate 6 (3-3) mg/mL; 1 mL lidocaine 2 %    Patient tolerance of procedure: patient had a vasovagal episode afterwards and recovered within 15 minutes without further incidents  Dressing:  Sterile dressing applied        Assessment/Plan  51 y o  female     1  Primary osteoarthritis of right knee  - meloxicam (MOBIC) 15 mg tablet; Take 1 tablet (15 mg total) by mouth daily  Dispense: 30 tablet; Refill: 3  - acetaminophen (TYLENOL) 650 mg CR tablet; Take 2 tablets (1,300 mg total) by mouth 2 (two) times a day for 30 days  Dispense: 120 tablet; Refill: 0  - Injection procedure prior authorization; Future  --- reviewed hyaluronic acid injections with patient  Plan on injection in 4-6 weeks    - Large joint arthrocentesis  --- received corticosteroid injection today, can receive q 3 months PRN

## 2019-02-04 RX ORDER — LIDOCAINE HYDROCHLORIDE 20 MG/ML
1 INJECTION, SOLUTION INFILTRATION; PERINEURAL
Status: COMPLETED | OUTPATIENT
Start: 2019-02-01 | End: 2019-02-01

## 2019-02-04 RX ORDER — BUPIVACAINE HYDROCHLORIDE 2.5 MG/ML
2 INJECTION, SOLUTION INFILTRATION; PERINEURAL
Status: COMPLETED | OUTPATIENT
Start: 2019-02-01 | End: 2019-02-01

## 2019-02-04 RX ORDER — BETAMETHASONE SODIUM PHOSPHATE AND BETAMETHASONE ACETATE 3; 3 MG/ML; MG/ML
6 INJECTION, SUSPENSION INTRA-ARTICULAR; INTRALESIONAL; INTRAMUSCULAR; SOFT TISSUE
Status: COMPLETED | OUTPATIENT
Start: 2019-02-01 | End: 2019-02-01

## 2019-02-13 ENCOUNTER — TELEPHONE (OUTPATIENT)
Dept: OBGYN CLINIC | Facility: HOSPITAL | Age: 49
End: 2019-02-13

## 2019-02-13 NOTE — TELEPHONE ENCOUNTER
TO BE COMPLETED BY CENTRAL AUTH TEAM:     Physician: DR Bessie Tavarez    Medication: Donavon Escamilla    Number of Injections in Series (Appointments scheduled 1 week apart from one another): 3    Schedule after this date: 3/15/19    Billing Info: Buy and Bill/Specialty Pharmacy-Patient Supply>> BUY&BILL    Appointment Message Line: RIGHT KNEE Donavon Escamilla #1,2,3 BUY&BILL  (please copy and paste appointment message line when scheduling appointment)    Additional Comments: CALLED PATIENT TO SCHEDULE APPT HOWEVER I WAS UNABLE TO LEAVE A MESSAGE BECAUSE THEIR VOICE MAIL WAS FULL

## 2019-02-17 ENCOUNTER — OFFICE VISIT (OUTPATIENT)
Dept: URGENT CARE | Facility: MEDICAL CENTER | Age: 49
End: 2019-02-17
Payer: COMMERCIAL

## 2019-02-17 VITALS
HEART RATE: 84 BPM | SYSTOLIC BLOOD PRESSURE: 122 MMHG | DIASTOLIC BLOOD PRESSURE: 66 MMHG | RESPIRATION RATE: 16 BRPM | OXYGEN SATURATION: 99 % | TEMPERATURE: 98.4 F

## 2019-02-17 DIAGNOSIS — R21 RASH: Primary | ICD-10-CM

## 2019-02-17 PROCEDURE — G0382 LEV 3 HOSP TYPE B ED VISIT: HCPCS | Performed by: PHYSICIAN ASSISTANT

## 2019-02-17 RX ORDER — NYSTATIN 100000 U/G
CREAM TOPICAL
COMMUNITY
Start: 2019-02-14 | End: 2019-05-16 | Stop reason: ALTCHOICE

## 2019-02-17 RX ORDER — PREDNISONE 50 MG/1
50 TABLET ORAL DAILY
Qty: 5 TABLET | Refills: 0 | Status: SHIPPED | OUTPATIENT
Start: 2019-02-17 | End: 2019-02-22

## 2019-02-17 RX ORDER — NAPROXEN 250 MG/1
250 TABLET ORAL
COMMUNITY
End: 2019-07-12 | Stop reason: ALTCHOICE

## 2019-02-17 RX ORDER — CLINDAMYCIN HYDROCHLORIDE 300 MG/1
300 CAPSULE ORAL 3 TIMES DAILY
Qty: 21 CAPSULE | Refills: 0 | Status: SHIPPED | OUTPATIENT
Start: 2019-02-17 | End: 2019-02-24

## 2019-02-17 NOTE — LETTER
February 17, 2019     Patient: Karuna Torres   YOB: 1970   Date of Visit: 2/17/2019       To Whom it May Concern:    Nicole Rodrigues was seen in my clinic on 2/17/2019  She may return to work on 02/18/2019  If you have any questions or concerns, please don't hesitate to call  Sincerely,          Tali Palmer PA-C        CC: No Recipients

## 2019-02-17 NOTE — PATIENT INSTRUCTIONS
Take prednisone as directed until completed  Take antibiotic as directed until completed  Use Benadryl as needed for itching relief  Follow up with PCP in 3-5 days  Proceed to  ER if symptoms worsen  Sarpullido thu   CUIDADO AMBULATORIO:   Un sarpullido  es la irritación, enrojecimiento o comezón de la piel o de las membranas mucosas  Las [employer] mucosas son las áreas vick el revestimiento de castro nariz o garganta  Thu significa que el salpullido comienza repentinamente, que empeora rápidamente y que dura poco tiempo  El salpullido thu podría ser provocado por reece enfermedad, vick la hepatitis o la vasculitis  El salpullido podría ser [employer] reacción a algo a lo que usted es alérgico, ivck al látex  Ciertos medicamentos, incluyendo los antibióticos, los [place], los analgésicos prescritos y la aspirina también pueden provocar un sarpullido  Busque atención médica de inmediato si:   · Tiene dolor en el pecho o dificultad repentina para respirar  · Usted está vomitando, tiene dolor de [place] o muscular y castro garganta está adolorida  Pregúntele a castro Rishi Obey vitaminas y minerales son adecuados para usted  · Usted tiene fiebre  · Kimmie heridas se abren debido a que se está rascando castro piel o usted tiene [employer] herida que está sheryl, [place] o adolorida  · Castro sarpullido dura más de 3 meses  · Usted tiene inflamación o dolor en kimmie articulaciones  · Usted tiene preguntas o inquietudes acerca de castro condición o cuidado  Medicamentos:  Si castro salpullido no desaparece por sí solo, usted podría necesitar los siguientes medicamentos:  · Los antihistamínicos  podrían administrarse para disminuir la comezón  · Esteroides  podría administrarse para disminuir la inflamación  · Antibióticos  ayudan a combatir o a evitar reece infección bacteriana  · Laverne kimmie medicamentos vick se le haya indicado    Consulte con castor médico si usted froylan que castro medicamento no le está ayudando o si presenta [employer] secundarios  Infórmele si es alérgico a algún medicamento  Mantenga reece lista actualizada de los [place], las vitaminas y los productos herbales que kwan  Incluya los siguientes datos de los medicamentos: cantidad, frecuencia y motivo de administración  Traiga con usted la lista o los envases de la píldoras a kimmie citas de seguimiento  Lleve la lista de los medicamentos con usted en олег de reece emergencia  Evite un salpullido o cuide castro piel cuando tenga salpullido:  La piel reseca puede generar más problemas  No se rasque castro piel si tiene comezón  Usted podría provocar reece infección en la piel si se rasca  Lo siguiente podría evitar que se reseque castro piel y podría ayudar a que se yuli mejor:  · Use cremas espesas o vaselina para ayudar a aliviar castro salpullido  Estos productos funcionan chaitanya en áreas con la piel gruesa, vick en kimmie pies  Las compresas de agua fría también podrían usarse para aliviar castro piel  Aplique reece compresa de agua fría o use reece toalla mojada con agua fría y después cúbrala con reece toalla seca  · Use agua tibia para bañarse  El [place] puede dañar más castro piel  Séquese la piel con palmaditas suaves  No se frote castro piel con la toalla  · Use detergentes, jabones, champú y burbujas para bañarse que estén hechos para piel sensible  Use ropa que esté hecha de algodón en vez de naylon o de tejal  El algodón es [address], así que no dañará tanto castro piel  Acuda a kimmie consultas de control con castro médico según le indicaron  Es posible que usted necesite humberto a un dermatólogo si otros médicos no saben lo que le está provocando el salpullido  Usted también podría necesitar humberto a un dermatólogo si castro salpullido no mejora aun con tratamiento  Usted podría necesitar humberto a un dietista si tiene alergias a los alimentos  Anote kimmie preguntas para que se acuerde de hacerlas bradley kimmie visitas    © 2017 [address] Information is for End User's use only and may not be sold, redistributed or otherwise used for commercial purposes  All illustrations and images included in CareNotes® are the copyrighted property of A D A M , Inc  or Danie Puri  Esta información es sólo para uso en educación  Castro intención no es darle un consejo médico sobre enfermedades o tratamientos  Colsulte con castro Freeda Gallego farmacéutico antes de seguir cualquier régimen médico para saber si es seguro y efectivo para usted

## 2019-02-17 NOTE — PROGRESS NOTES
[facility]        NAME: Des Cohn is a 51 y o  female  : 1970    MRN: [id number]  DATE: 2019  TIME: 6:41 PM    Assessment and Plan   Rash [R21]  1  Rash  predniSONE 50 mg tablet    clindamycin (CLEOCIN) 300 MG capsule    Ambulatory referral to Dermatology         Patient Instructions     Take prednisone as directed until completed  Take antibiotic as directed until completed  Use Benadryl as needed for itching relief  Follow up with PCP in 3-5 days  Proceed to  ER if symptoms worsen  Chief Complaint     Chief Complaint   Patient presents with    Rash     around umbilicus   Seen here at ED 1 week ago, started on cream  Now on arms, neck , abdomen  Using nystatin lotion and applying lotrin with some relief  History of Present Illness       69-year-old female presents with rash  Patient reports she was seen in the ER and evaluated for the rash and was told it was something fungal   Was given a cream to put on the rash  Since then rash has worsened  Now is on abdomen back of her neck bilateral arms and in her groin  Reports very itchy  No fevers chills  No swelling of lips tongue or throat  Rash   This is a new problem  The current episode started 1 to 4 weeks ago  The problem has been gradually worsening since onset  The rash is diffuse  The rash is characterized by dryness, itchiness, pain and redness  She was exposed to nothing  Pertinent negatives include no cough, fever, rhinorrhea or shortness of breath  Past treatments include nothing  The treatment provided no relief  Review of Systems   Review of Systems   Constitutional: Negative  Negative for fever  HENT: Negative  Negative for rhinorrhea  Eyes: Negative  Respiratory: Negative  Negative for cough and shortness of breath  Cardiovascular: Negative  Gastrointestinal: Negative  Musculoskeletal: Negative  Skin: Positive for rash  Neurological: Negative            Current Medications       Current Outpatient Medications:     acetaminophen (TYLENOL) 650 mg CR tablet, Take 2 tablets (1,300 mg total) by mouth 2 (two) times a day for 30 days, Disp: 120 tablet, Rfl: 0    meloxicam (MOBIC) 15 mg tablet, Take 1 tablet (15 mg total) by mouth daily, Disp: 30 tablet, Rfl: 3    nystatin (MYCOSTATIN) cream, Apply topically, Disp: , Rfl:     Omega-3 1000 MG CAPS, Take 1 capsule by mouth daily, Disp: , Rfl:     Topiramate ER (TROKENDI XR) 25 MG CP24, 1 capsule daily, Disp: , Rfl:     betamethasone valerate (VALISONE) 0 1 % cream, , Disp: , Rfl: 0    Biotin 10 MG CAPS, Take 1 capsule by mouth daily, Disp: , Rfl:     clindamycin (CLEOCIN) 300 MG capsule, Take 1 capsule (300 mg total) by mouth 3 (three) times a day for 7 days, Disp: 21 capsule, Rfl: 0    metroNIDAZOLE (FLAGYL) 500 mg tablet, Take 500 mg by mouth every 12 (twelve) hours, Disp: , Rfl:     naproxen (NAPROSYN) 250 mg tablet, Take 250 mg by mouth, Disp: , Rfl:     neomycin-polymyxin-hydrocortisone (CORTISPORIN) otic solution, Administer 4 drops into both ears every 6 (six) hours for 10 days, Disp: 10 mL, Rfl: 0    phentermine 37 5 MG capsule, Take 37 5 mg by mouth every morning, Disp: , Rfl:     predniSONE 50 mg tablet, Take 1 tablet (50 mg total) by mouth daily for 5 days, Disp: 5 tablet, Rfl: 0    Current Allergies     Allergies as of 02/17/2019 - Reviewed 02/17/2019   Allergen Reaction Noted    Penicillins Anaphylaxis 06/25/2017            The following portions of the patient's history were reviewed and updated as appropriate: allergies, current medications, past family history, past medical history, past social history, past surgical history and problem list      Past Medical History:   Diagnosis Date    Arthritis     Bacterial vaginosis     Obesity        History reviewed  No pertinent surgical history      Family History   Problem Relation Age of Onset    Prostate cancer Father          Medications have been verified  Objective   /66 (BP Location: Left arm, Patient Position: Sitting, Cuff Size: Standard)   Pulse 84   Temp 98 4 °F (36 9 °C) (Tympanic)   Resp 16   SpO2 99%        Physical Exam     Physical Exam   Constitutional: She is oriented to person, place, and time  She appears well-developed and well-nourished  No distress  HENT:   Head: Normocephalic and atraumatic  Right Ear: External ear normal    Left Ear: External ear normal    Nose: Nose normal    Mouth/Throat: Oropharynx is clear and moist  No oropharyngeal exudate  Eyes: Conjunctivae are normal  Right eye exhibits no discharge  Left eye exhibits no discharge  Neck: Normal range of motion  Neck supple  Cardiovascular: Normal rate, regular rhythm, normal heart sounds and intact distal pulses  No murmur heard  Pulmonary/Chest: Effort normal and breath sounds normal  No respiratory distress  She has no wheezes  She has no rales  Abdominal: Soft  Bowel sounds are normal  There is no tenderness  Musculoskeletal: Normal range of motion  Lymphadenopathy:     She has no cervical adenopathy  Neurological: She is alert and oriented to person, place, and time  Skin: Skin is warm and dry  Rash (See pictures) noted  Psychiatric: She has a normal mood and affect  Nursing note and vitals reviewed

## 2019-03-15 ENCOUNTER — HOSPITAL ENCOUNTER (OUTPATIENT)
Dept: RADIOLOGY | Facility: HOSPITAL | Age: 49
Discharge: HOME | End: 2019-03-15
Payer: COMMERCIAL

## 2019-03-15 ENCOUNTER — OFFICE VISIT (OUTPATIENT)
Dept: OBGYN CLINIC | Facility: HOSPITAL | Age: 49
End: 2019-03-15
Payer: COMMERCIAL

## 2019-03-15 VITALS
HEIGHT: 62 IN | WEIGHT: 165 LBS | HEART RATE: 105 BPM | DIASTOLIC BLOOD PRESSURE: 80 MMHG | BODY MASS INDEX: 30.36 KG/M2 | SYSTOLIC BLOOD PRESSURE: 124 MMHG

## 2019-03-15 DIAGNOSIS — M25.562 CHRONIC PAIN OF LEFT KNEE: Primary | ICD-10-CM

## 2019-03-15 DIAGNOSIS — M25.562 CHRONIC PAIN OF LEFT KNEE: ICD-10-CM

## 2019-03-15 DIAGNOSIS — G89.29 CHRONIC PAIN OF LEFT KNEE: ICD-10-CM

## 2019-03-15 DIAGNOSIS — G89.29 CHRONIC PAIN OF LEFT KNEE: Primary | ICD-10-CM

## 2019-03-15 DIAGNOSIS — M17.11 PRIMARY OSTEOARTHRITIS OF RIGHT KNEE: ICD-10-CM

## 2019-03-15 PROCEDURE — 73562 X-RAY EXAM OF KNEE 3: CPT

## 2019-03-15 PROCEDURE — 20610 DRAIN/INJ JOINT/BURSA W/O US: CPT | Performed by: ORTHOPAEDIC SURGERY

## 2019-03-15 RX ADMIN — Medication 20 MG: at 09:06

## 2019-03-16 ENCOUNTER — OFFICE VISIT (OUTPATIENT)
Dept: URGENT CARE | Facility: MEDICAL CENTER | Age: 49
End: 2019-03-16
Payer: COMMERCIAL

## 2019-03-16 VITALS
TEMPERATURE: 98.2 F | HEART RATE: 94 BPM | OXYGEN SATURATION: 100 % | RESPIRATION RATE: 20 BRPM | SYSTOLIC BLOOD PRESSURE: 116 MMHG | DIASTOLIC BLOOD PRESSURE: 70 MMHG

## 2019-03-16 DIAGNOSIS — R21 RASH: Primary | ICD-10-CM

## 2019-03-16 PROCEDURE — G0382 LEV 3 HOSP TYPE B ED VISIT: HCPCS | Performed by: PHYSICIAN ASSISTANT

## 2019-03-16 RX ORDER — PREDNISONE 50 MG/1
50 TABLET ORAL DAILY
Qty: 5 TABLET | Refills: 0 | Status: SHIPPED | OUTPATIENT
Start: 2019-03-16 | End: 2019-03-21

## 2019-03-16 RX ORDER — TRIAMCINOLONE ACETONIDE 0.25 MG/G
CREAM TOPICAL 2 TIMES DAILY
COMMUNITY
End: 2019-05-16 | Stop reason: ALTCHOICE

## 2019-03-16 NOTE — PROGRESS NOTES
[facility]        NAME: Azra Bauer is a 51 y o  female  : 1970    MRN: [id number]  DATE: 2019  TIME: 6:38 PM    Assessment and Plan   Rash [R21]  1  Rash  predniSONE 50 mg tablet         Patient Instructions     Take prednisone as directed until completed  Follow-up with dermatologist  Follow up with PCP in 3-5 days  Proceed to  ER if symptoms worsen  Chief Complaint     Chief Complaint   Patient presents with    Rash     generalized red , itchy rash for 2 weeks  History of Present Illness       79-year-old female presents with rash  Reports rash has been ongoing for the past 2 week and gradually getting worse  Patient was seen previously for the same thing  Reports that when she was treated with a prednisone symptoms all went away for awhile but now they started coming back  Patient reports that she was seen by a dermatologist and given triamcinolone cream which she has been using but symptoms are continuing to progress  Denies any swelling of lips throat tongue mouth  No shortness of breath or chest pain  Rash   This is a new problem  The current episode started in the past 7 days  The problem has been gradually worsening since onset  The rash is diffuse  The rash is characterized by redness, swelling and itchiness  She was exposed to nothing  Pertinent negatives include no cough, fatigue, fever, rhinorrhea, shortness of breath, sore throat or vomiting  Past treatments include cold compress, oral steroids, topical steroids, moisturizer, antihistamine and anti-itch cream  The treatment provided no relief  Review of Systems   Review of Systems   Constitutional: Negative  Negative for fatigue and fever  HENT: Negative  Negative for rhinorrhea and sore throat  Eyes: Negative  Respiratory: Negative  Negative for cough and shortness of breath  Cardiovascular: Negative  Gastrointestinal: Negative  Negative for vomiting     Musculoskeletal: Negative  Skin: Positive for rash  Neurological: Negative  Current Medications       Current Outpatient Medications:     Biotin 10 MG CAPS, Take 1 capsule by mouth daily, Disp: , Rfl:     Omega-3 1000 MG CAPS, Take 1 capsule by mouth daily, Disp: , Rfl:     triamcinolone (KENALOG) 0 025 % cream, Apply topically 2 (two) times a day, Disp: , Rfl:     betamethasone valerate (VALISONE) 0 1 % cream, , Disp: , Rfl: 0    meloxicam (MOBIC) 15 mg tablet, Take 1 tablet (15 mg total) by mouth daily (Patient not taking: Reported on 3/16/2019), Disp: 30 tablet, Rfl: 3    naproxen (NAPROSYN) 250 mg tablet, Take 250 mg by mouth, Disp: , Rfl:     neomycin-polymyxin-hydrocortisone (CORTISPORIN) otic solution, Administer 4 drops into both ears every 6 (six) hours for 10 days, Disp: 10 mL, Rfl: 0    nystatin (MYCOSTATIN) cream, Apply topically, Disp: , Rfl:     phentermine 37 5 MG capsule, Take 37 5 mg by mouth every morning, Disp: , Rfl:     predniSONE 50 mg tablet, Take 1 tablet (50 mg total) by mouth daily for 5 days, Disp: 5 tablet, Rfl: 0    Topiramate ER (TROKENDI XR) 25 MG CP24, 1 capsule daily, Disp: , Rfl:     Current Allergies     Allergies as of 03/16/2019 - Reviewed 03/16/2019   Allergen Reaction Noted    Penicillins Anaphylaxis 06/25/2017            The following portions of the patient's history were reviewed and updated as appropriate: allergies, current medications, past family history, past medical history, past social history, past surgical history and problem list      Past Medical History:   Diagnosis Date    Arthritis     Bacterial vaginosis     Migraine     Obesity        History reviewed  No pertinent surgical history  Family History   Problem Relation Age of Onset    Prostate cancer Father          Medications have been verified          Objective   /70 (BP Location: Left arm, Patient Position: Sitting, Cuff Size: Standard)   Pulse 94   Temp 98 2 °F (36 8 °C)   Resp 20 SpO2 100%        Physical Exam     Physical Exam   Constitutional: She is oriented to person, place, and time  She appears well-developed and well-nourished  No distress  HENT:   Head: Normocephalic and atraumatic  Right Ear: External ear normal    Left Ear: External ear normal    Nose: Nose normal    Mouth/Throat: Oropharynx is clear and moist  No oropharyngeal exudate  Eyes: Conjunctivae are normal  Right eye exhibits no discharge  Left eye exhibits no discharge  Neck: Normal range of motion  Neck supple  Cardiovascular: Normal rate, regular rhythm, normal heart sounds and intact distal pulses  No murmur heard  Pulmonary/Chest: Effort normal and breath sounds normal  No respiratory distress  She has no wheezes  She has no rales  Abdominal: Soft  Bowel sounds are normal  There is no tenderness  Musculoskeletal: Normal range of motion  Lymphadenopathy:     She has no cervical adenopathy  Neurological: She is alert and oriented to person, place, and time  Skin: Skin is warm and dry  Rash (Diffuse erythematous red rash noted to bilateral arms bilateral legs back and abdomen ) noted  There is erythema  Psychiatric: She has a normal mood and affect  Nursing note and vitals reviewed

## 2019-03-16 NOTE — PATIENT INSTRUCTIONS
Take prednisone as directed until completed  Follow-up with dermatologist  Follow up with PCP in 3-5 days  Proceed to  ER if symptoms worsen  Acute Rash   AMBULATORY CARE:   A rash  is irritation, redness, or itchiness in the skin or mucus membranes  Mucus membranes are areas such as the lining of your nose or throat  Acute means the rash starts suddenly, worsens quickly, and lasts a short time  An acute rash may be caused by a disease, such as hepatitis or vasculitis  The rash may be a reaction to something you are allergic to, such as certain foods, or latex  Certain medicines, including antibiotics, NSAIDs, prescription pain medicines, and aspirin can also cause a rash  Seek care immediately if:   · You have sudden trouble breathing or chest pain  · You are vomiting, have a headache or muscle aches, and your throat hurts  Contact your healthcare provider if:   · You have a fever  · You get open wounds from scratching your skin, or you have a wound that is red, swollen, or painful  · Your rash lasts longer than 3 months  · You have swelling or pain in your joints  · You have questions or concerns about your condition or care  Medicines:  If your rash does not go away on its own, you may need the following:  · Antihistamines  may be given to help decrease itching  · Steroids  may be given to decrease inflammation  · Antibiotics  help fight or prevent a bacterial infection  · Take your medicine as directed  Contact your healthcare provider if you think your medicine is not helping or if you have side effects  Tell him of her if you are allergic to any medicine  Keep a list of the medicines, vitamins, and herbs you take  Include the amounts, and when and why you take them  Bring the list or the pill bottles to follow-up visits  Carry your medicine list with you in case of an emergency  Prevent a rash or care for your skin when you have a rash:  Dry skin can lead to more problems  Do not scratch your skin if it itches  You may cause a skin infection by scratching  The following may prevent dry skin, and help your skin look better:  · Use thick cream lotions or petroleum jelly to help soothe your rash  These products work well on areas with thick skin, such as your feet  Cool compresses may also be used to soothe your skin  Apply a cool compress or a cool, wet towel, and then cover it with a dry towel  · Use lukewarm water when you bathe  Hot water may damage your skin more  Pat your skin dry  Do not rub your skin with a towel  · Use detergents, soaps, shampoos, and bubble baths made for sensitive skin  Wear clothes that are made of cotton instead of nylon or wool  Cotton is softer, so it will not hurt your skin as much  Follow up with your healthcare provider as directed: You may need to see a dermatologist if healthcare providers do not know what is causing your rash  You may also need to see a dermatologist if your rash does not get better even with treatment  You may need to see a dietitian if you have allergies to foods  Write down your questions so you remember to ask them during your visits  © 2017 [facility] [facility] Information is for End User's use only and may not be sold, redistributed or otherwise used for commercial purposes  All illustrations and images included in CareNotes® are the copyrighted property of A D A M , Inc  or Danie Puri  The above information is an  only  It is not intended as medical advice for individual conditions or treatments  Talk to your doctor, nurse or pharmacist before following any medical regimen to see if it is safe and effective for you

## 2019-03-22 ENCOUNTER — OFFICE VISIT (OUTPATIENT)
Dept: OBGYN CLINIC | Facility: HOSPITAL | Age: 49
End: 2019-03-22
Payer: COMMERCIAL

## 2019-03-22 VITALS
BODY MASS INDEX: 30.36 KG/M2 | SYSTOLIC BLOOD PRESSURE: 122 MMHG | WEIGHT: 165 LBS | DIASTOLIC BLOOD PRESSURE: 80 MMHG | HEART RATE: 88 BPM | HEIGHT: 62 IN

## 2019-03-22 DIAGNOSIS — M17.0 PRIMARY OSTEOARTHRITIS OF BOTH KNEES: Primary | ICD-10-CM

## 2019-03-22 PROCEDURE — 99213 OFFICE O/P EST LOW 20 MIN: CPT | Performed by: PHYSICIAN ASSISTANT

## 2019-03-22 PROCEDURE — 20610 DRAIN/INJ JOINT/BURSA W/O US: CPT | Performed by: PHYSICIAN ASSISTANT

## 2019-03-22 RX ADMIN — BUPIVACAINE HYDROCHLORIDE 2 ML: 2.5 INJECTION, SOLUTION INFILTRATION; PERINEURAL at 11:24

## 2019-03-22 RX ADMIN — LIDOCAINE HYDROCHLORIDE 1 ML: 20 INJECTION, SOLUTION INFILTRATION; PERINEURAL at 11:24

## 2019-03-22 RX ADMIN — Medication 20 MG: at 11:24

## 2019-03-22 RX ADMIN — BETAMETHASONE SODIUM PHOSPHATE AND BETAMETHASONE ACETATE 6 MG: 3; 3 INJECTION, SUSPENSION INTRA-ARTICULAR; INTRALESIONAL; INTRAMUSCULAR; SOFT TISSUE at 11:24

## 2019-03-29 ENCOUNTER — OFFICE VISIT (OUTPATIENT)
Dept: OBGYN CLINIC | Facility: HOSPITAL | Age: 49
End: 2019-03-29
Payer: COMMERCIAL

## 2019-03-29 VITALS
HEIGHT: 62 IN | WEIGHT: 165 LBS | SYSTOLIC BLOOD PRESSURE: 127 MMHG | HEART RATE: 101 BPM | DIASTOLIC BLOOD PRESSURE: 80 MMHG | BODY MASS INDEX: 30.36 KG/M2

## 2019-03-29 DIAGNOSIS — M17.11 PRIMARY OSTEOARTHRITIS OF RIGHT KNEE: Primary | ICD-10-CM

## 2019-03-29 PROCEDURE — 20610 DRAIN/INJ JOINT/BURSA W/O US: CPT | Performed by: ORTHOPAEDIC SURGERY

## 2019-03-29 PROCEDURE — 99213 OFFICE O/P EST LOW 20 MIN: CPT | Performed by: ORTHOPAEDIC SURGERY

## 2019-03-29 RX ADMIN — Medication 20 MG: at 12:25

## 2019-04-08 RX ORDER — HYALURONATE SODIUM 10 MG/ML
20 SYRINGE (ML) INTRAARTICULAR
Status: COMPLETED | OUTPATIENT
Start: 2019-03-15 | End: 2019-03-15

## 2019-04-09 RX ORDER — LIDOCAINE HYDROCHLORIDE 20 MG/ML
1 INJECTION, SOLUTION INFILTRATION; PERINEURAL
Status: COMPLETED | OUTPATIENT
Start: 2019-03-22 | End: 2019-03-22

## 2019-04-09 RX ORDER — HYALURONATE SODIUM 10 MG/ML
20 SYRINGE (ML) INTRAARTICULAR
Status: COMPLETED | OUTPATIENT
Start: 2019-03-22 | End: 2019-03-22

## 2019-04-09 RX ORDER — BUPIVACAINE HYDROCHLORIDE 2.5 MG/ML
2 INJECTION, SOLUTION INFILTRATION; PERINEURAL
Status: COMPLETED | OUTPATIENT
Start: 2019-03-22 | End: 2019-03-22

## 2019-04-09 RX ORDER — BETAMETHASONE SODIUM PHOSPHATE AND BETAMETHASONE ACETATE 3; 3 MG/ML; MG/ML
6 INJECTION, SUSPENSION INTRA-ARTICULAR; INTRALESIONAL; INTRAMUSCULAR; SOFT TISSUE
Status: COMPLETED | OUTPATIENT
Start: 2019-03-22 | End: 2019-03-22

## 2019-04-10 RX ORDER — HYALURONATE SODIUM 10 MG/ML
20 SYRINGE (ML) INTRAARTICULAR
Status: COMPLETED | OUTPATIENT
Start: 2019-03-29 | End: 2019-03-29

## 2019-05-10 ENCOUNTER — OFFICE VISIT (OUTPATIENT)
Dept: OBGYN CLINIC | Facility: HOSPITAL | Age: 49
End: 2019-05-10
Payer: COMMERCIAL

## 2019-05-10 VITALS
DIASTOLIC BLOOD PRESSURE: 84 MMHG | HEIGHT: 62 IN | BODY MASS INDEX: 31.28 KG/M2 | HEART RATE: 91 BPM | WEIGHT: 170 LBS | SYSTOLIC BLOOD PRESSURE: 133 MMHG

## 2019-05-10 DIAGNOSIS — M17.11 PRIMARY OSTEOARTHRITIS OF RIGHT KNEE: Primary | ICD-10-CM

## 2019-05-10 PROCEDURE — 99213 OFFICE O/P EST LOW 20 MIN: CPT | Performed by: PHYSICIAN ASSISTANT

## 2019-05-10 PROCEDURE — 20610 DRAIN/INJ JOINT/BURSA W/O US: CPT | Performed by: PHYSICIAN ASSISTANT

## 2019-05-10 RX ADMIN — BETAMETHASONE SODIUM PHOSPHATE AND BETAMETHASONE ACETATE 6 MG: 3; 3 INJECTION, SUSPENSION INTRA-ARTICULAR; INTRALESIONAL; INTRAMUSCULAR; SOFT TISSUE at 11:23

## 2019-05-10 RX ADMIN — LIDOCAINE HYDROCHLORIDE 1 ML: 20 INJECTION, SOLUTION INFILTRATION; PERINEURAL at 11:23

## 2019-05-10 RX ADMIN — BUPIVACAINE HYDROCHLORIDE 2 ML: 2.5 INJECTION, SOLUTION INFILTRATION; PERINEURAL at 11:23

## 2019-05-13 ENCOUNTER — TELEPHONE (OUTPATIENT)
Dept: OBGYN CLINIC | Facility: HOSPITAL | Age: 49
End: 2019-05-13

## 2019-05-14 DIAGNOSIS — M17.11 PRIMARY OSTEOARTHRITIS OF RIGHT KNEE: Primary | ICD-10-CM

## 2019-05-16 ENCOUNTER — OFFICE VISIT (OUTPATIENT)
Dept: FAMILY MEDICINE CLINIC | Facility: CLINIC | Age: 49
End: 2019-05-16
Payer: COMMERCIAL

## 2019-05-16 VITALS
DIASTOLIC BLOOD PRESSURE: 90 MMHG | RESPIRATION RATE: 18 BRPM | HEIGHT: 62 IN | SYSTOLIC BLOOD PRESSURE: 140 MMHG | WEIGHT: 169.8 LBS | TEMPERATURE: 98.3 F | BODY MASS INDEX: 31.25 KG/M2 | HEART RATE: 87 BPM | OXYGEN SATURATION: 98 %

## 2019-05-16 DIAGNOSIS — M17.11 PRIMARY OSTEOARTHRITIS OF RIGHT KNEE: ICD-10-CM

## 2019-05-16 DIAGNOSIS — Z13.220 SCREENING, LIPID: ICD-10-CM

## 2019-05-16 DIAGNOSIS — Z00.00 ENCOUNTER FOR HEALTH MAINTENANCE EXAMINATION IN ADULT: Primary | ICD-10-CM

## 2019-05-16 PROCEDURE — 99396 PREV VISIT EST AGE 40-64: CPT | Performed by: FAMILY MEDICINE

## 2019-05-27 RX ORDER — BETAMETHASONE SODIUM PHOSPHATE AND BETAMETHASONE ACETATE 3; 3 MG/ML; MG/ML
6 INJECTION, SUSPENSION INTRA-ARTICULAR; INTRALESIONAL; INTRAMUSCULAR; SOFT TISSUE
Status: COMPLETED | OUTPATIENT
Start: 2019-05-10 | End: 2019-05-10

## 2019-05-27 RX ORDER — BUPIVACAINE HYDROCHLORIDE 2.5 MG/ML
2 INJECTION, SOLUTION INFILTRATION; PERINEURAL
Status: COMPLETED | OUTPATIENT
Start: 2019-05-10 | End: 2019-05-10

## 2019-05-27 RX ORDER — LIDOCAINE HYDROCHLORIDE 20 MG/ML
1 INJECTION, SOLUTION INFILTRATION; PERINEURAL
Status: COMPLETED | OUTPATIENT
Start: 2019-05-10 | End: 2019-05-10

## 2019-06-07 ENCOUNTER — OFFICE VISIT (OUTPATIENT)
Dept: OBGYN CLINIC | Facility: HOSPITAL | Age: 49
End: 2019-06-07
Payer: COMMERCIAL

## 2019-06-07 VITALS
SYSTOLIC BLOOD PRESSURE: 119 MMHG | HEIGHT: 62 IN | HEART RATE: 112 BPM | WEIGHT: 170 LBS | DIASTOLIC BLOOD PRESSURE: 77 MMHG | BODY MASS INDEX: 31.28 KG/M2

## 2019-06-07 DIAGNOSIS — M17.0 PRIMARY OSTEOARTHRITIS OF BOTH KNEES: Primary | ICD-10-CM

## 2019-06-07 DIAGNOSIS — S83.249A ACUTE MEDIAL MENISCAL TEAR, UNSPECIFIED LATERALITY, INITIAL ENCOUNTER: ICD-10-CM

## 2019-06-07 PROCEDURE — 99213 OFFICE O/P EST LOW 20 MIN: CPT | Performed by: PHYSICIAN ASSISTANT

## 2019-06-25 ENCOUNTER — APPOINTMENT (OUTPATIENT)
Dept: RADIOLOGY | Facility: HOSPITAL | Age: 49
End: 2019-06-25
Payer: COMMERCIAL

## 2019-06-25 ENCOUNTER — HOSPITAL ENCOUNTER (OUTPATIENT)
Dept: RADIOLOGY | Facility: HOSPITAL | Age: 49
Discharge: HOME | End: 2019-06-25
Payer: COMMERCIAL

## 2019-06-25 DIAGNOSIS — S83.249A ACUTE MEDIAL MENISCAL TEAR, UNSPECIFIED LATERALITY, INITIAL ENCOUNTER: ICD-10-CM

## 2019-06-25 DIAGNOSIS — M17.0 PRIMARY OSTEOARTHRITIS OF BOTH KNEES: ICD-10-CM

## 2019-06-25 PROCEDURE — 73721 MRI JNT OF LWR EXTRE W/O DYE: CPT

## 2019-07-08 NOTE — PROGRESS NOTES
51 y o  female presenting to the office for follow up of bilateral knee pain, left worse than right  Patient states that she has severe pain which has not been improved with any interventions at this time, including steroid hyaluronic acid injections, bracing, physical therapy  Patient states that the pain does keep her up at night  Patient does have functional limitations preventing her from doing regular daily activities  ROS  Review of Systems   Constitutional: Negative for fever and unexpected weight change  HENT: Negative for hearing loss, nosebleeds and sore throat  Eyes: Negative for pain, redness and visual disturbance  Respiratory: Negative for cough, shortness of breath and wheezing  Cardiovascular: Negative for chest pain, palpitations and leg swelling  Gastrointestinal: Negative for abdominal pain, nausea and vomiting  Endocrine: Negative for polydipsia and polyuria  Genitourinary: Negative for dysuria and hematuria  Skin: Negative for rash and wound  Neurological: Negative for dizziness and headaches  Psychiatric/Behavioral: Negative for agitation and suicidal ideas  Past Medical History:   Diagnosis Date    Arthritis     Bacterial vaginosis     Migraine     Obesity     Varicella      History reviewed  No pertinent surgical history  Results Reviewed     None          Physical Exam  Physical Exam   Constitutional: She is oriented to person, place, and time  She appears well-developed and well-nourished  HENT:   Head: Normocephalic and atraumatic  Eyes: Pupils are equal, round, and reactive to light  EOM are normal    Neck: Neck supple  No tracheal deviation present  Cardiovascular: Normal rate and regular rhythm  Pulmonary/Chest: Effort normal and breath sounds normal    Abdominal: There is no guarding  Musculoskeletal:        Right knee: She exhibits no effusion  Left knee: She exhibits no effusion     Neurological: She is alert and oriented to person, place, and time  Skin: Skin is warm and dry  Psychiatric: She has a normal mood and affect  Her behavior is normal      Right Knee Exam     Tenderness   The patient is experiencing tenderness in the medial joint line and lateral joint line  Range of Motion   Extension: 0   Flexion: 120     Tests   Carla:  Medial - positive Lateral - negative  Varus: negative Valgus: negative    Other   Erythema: absent  Sensation: normal  Swelling: none  Effusion: no effusion present      Left Knee Exam     Tenderness   The patient is experiencing tenderness in the lateral joint line and medial joint line  Range of Motion   Extension: 0   Flexion: 110     Tests   Carla:  Medial - positive Lateral - negative  Varus: negative Valgus: negative    Other   Erythema: absent  Sensation: normal  Swelling: none  Effusion: no effusion present          Assessment/Plan  51 y o  female    1  Primary osteoarthritis of both knees  - MRI knee left  wo contrast; Future  - MRI knee right  wo contrast; Future    2  Acute medial meniscal tear, unspecified laterality, initial encounter  - MRI knee left  wo contrast; Future  - MRI knee right  wo contrast; Future    At this time, due to the severity of her symptoms and mechanical locking on exam, patient would benefit from MRI evaluation to assess for meniscus injuries and discuss possible surgical planning

## 2019-07-11 ENCOUNTER — APPOINTMENT (OUTPATIENT)
Dept: LAB | Facility: HOSPITAL | Age: 49
End: 2019-07-11
Attending: ORTHOPAEDIC SURGERY
Payer: COMMERCIAL

## 2019-07-11 ENCOUNTER — OFFICE VISIT (OUTPATIENT)
Dept: OBGYN CLINIC | Facility: HOSPITAL | Age: 49
End: 2019-07-11
Payer: COMMERCIAL

## 2019-07-11 VITALS
WEIGHT: 170 LBS | HEART RATE: 92 BPM | DIASTOLIC BLOOD PRESSURE: 85 MMHG | HEIGHT: 62 IN | SYSTOLIC BLOOD PRESSURE: 142 MMHG | BODY MASS INDEX: 31.28 KG/M2

## 2019-07-11 DIAGNOSIS — S83.232A COMPLEX TEAR OF MEDIAL MENISCUS OF LEFT KNEE AS CURRENT INJURY, INITIAL ENCOUNTER: ICD-10-CM

## 2019-07-11 DIAGNOSIS — M17.0 PRIMARY OSTEOARTHRITIS OF BOTH KNEES: Primary | ICD-10-CM

## 2019-07-11 DIAGNOSIS — Z82.61 FAMILY HISTORY OF RHEUMATOID ARTHRITIS: ICD-10-CM

## 2019-07-11 LAB
BASOPHILS # BLD AUTO: 0.05 THOUSANDS/ΜL (ref 0–0.1)
BASOPHILS NFR BLD AUTO: 1 % (ref 0–1)
CRP SERPL QL: 8.7 MG/L
EOSINOPHIL # BLD AUTO: 0.06 THOUSAND/ΜL (ref 0–0.61)
EOSINOPHIL NFR BLD AUTO: 1 % (ref 0–6)
ERYTHROCYTE [DISTWIDTH] IN BLOOD BY AUTOMATED COUNT: 12.6 % (ref 11.6–15.1)
ERYTHROCYTE [SEDIMENTATION RATE] IN BLOOD: 7 MM/HOUR (ref 0–20)
HCT VFR BLD AUTO: 45.9 % (ref 34.8–46.1)
HGB BLD-MCNC: 14.2 G/DL (ref 11.5–15.4)
IMM GRANULOCYTES # BLD AUTO: 0.03 THOUSAND/UL (ref 0–0.2)
IMM GRANULOCYTES NFR BLD AUTO: 0 % (ref 0–2)
LYMPHOCYTES # BLD AUTO: 2.1 THOUSANDS/ΜL (ref 0.6–4.47)
LYMPHOCYTES NFR BLD AUTO: 27 % (ref 14–44)
MCH RBC QN AUTO: 28.9 PG (ref 26.8–34.3)
MCHC RBC AUTO-ENTMCNC: 30.9 G/DL (ref 31.4–37.4)
MCV RBC AUTO: 93 FL (ref 82–98)
MONOCYTES # BLD AUTO: 0.48 THOUSAND/ΜL (ref 0.17–1.22)
MONOCYTES NFR BLD AUTO: 6 % (ref 4–12)
NEUTROPHILS # BLD AUTO: 5.17 THOUSANDS/ΜL (ref 1.85–7.62)
NEUTS SEG NFR BLD AUTO: 65 % (ref 43–75)
NRBC BLD AUTO-RTO: 0 /100 WBCS
PLATELET # BLD AUTO: 277 THOUSANDS/UL (ref 149–390)
PMV BLD AUTO: 9.7 FL (ref 8.9–12.7)
RBC # BLD AUTO: 4.92 MILLION/UL (ref 3.81–5.12)
WBC # BLD AUTO: 7.89 THOUSAND/UL (ref 4.31–10.16)

## 2019-07-11 PROCEDURE — 86200 CCP ANTIBODY: CPT

## 2019-07-11 PROCEDURE — 81374 HLA I TYPING 1 ANTIGEN LR: CPT

## 2019-07-11 PROCEDURE — 85652 RBC SED RATE AUTOMATED: CPT

## 2019-07-11 PROCEDURE — 85025 COMPLETE CBC W/AUTO DIFF WBC: CPT

## 2019-07-11 PROCEDURE — 86430 RHEUMATOID FACTOR TEST QUAL: CPT

## 2019-07-11 PROCEDURE — 36415 COLL VENOUS BLD VENIPUNCTURE: CPT

## 2019-07-11 PROCEDURE — 86140 C-REACTIVE PROTEIN: CPT

## 2019-07-11 PROCEDURE — 86618 LYME DISEASE ANTIBODY: CPT

## 2019-07-11 PROCEDURE — 99213 OFFICE O/P EST LOW 20 MIN: CPT | Performed by: ORTHOPAEDIC SURGERY

## 2019-07-11 PROCEDURE — 86038 ANTINUCLEAR ANTIBODIES: CPT

## 2019-07-11 NOTE — PROGRESS NOTES
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, MMT  She continues with diffuse pain all around anterior knee, limited motion due to pain  Due to level of arthritis arthroscopy not recommended, she does have family history of RA and due to age given lab work to r/o underlying anti inflammatory disease  She has medial  coming today  See back 10 days to discuss lab work and further treatment, trying to avoid total knee replacement  cataflam 50 sent to [facility]    To do next visit:  No follow-ups on file  The above stated was discussed in layman's terms and the patient expressed understanding  All questions were answered to the patient's satisfaction  Scribe Attestation    I,:    am acting as a scribe while in the presence of the attending physician :        I,:    personally performed the services described in this documentation    as scribed in my presence :              Subjective:   Ronald Villegas is a 51 y o  female who presents to review MRI of her bilateral knee's  She is here to review MRI of her left knee, tricompartmental osteoarthritis worse patellofemoral compartment  She also has complex MMT  She continues to note diffuse pain with daily activities, work secondary to knee pain  She will note catching, locking in left knee at times  She is unable to fully flex or extend her knee due to pain  She has had no relief from cortisone , visco, therapy, medications  Review of systems negative unless otherwise specified in HPI    Past Medical History:   Diagnosis Date    Arthritis     Bacterial vaginosis     Migraine     Obesity     Varicella        History reviewed  No pertinent surgical history      Family History   Problem Relation Age of Onset    Prostate cancer Father     Cancer Father     Hypertension Mother        Social History     Occupational History    Not on file   Tobacco Use    Smoking status: Never Smoker    Smokeless tobacco: Never Used   Substance and Sexual Activity    Alcohol use: No     Comment: Per [employer]; Social use    Drug use: No    Sexual activity: Not on file         Current Outpatient Medications:     Biotin 10 MG CAPS, Take 1 capsule by mouth daily, Disp: , Rfl:     naproxen (NAPROSYN) 250 mg tablet, Take 250 mg by mouth, Disp: , Rfl:     Omega-3 1000 MG CAPS, Take 1 capsule by mouth daily, Disp: , Rfl:     Topiramate ER (TROKENDI XR) 25 MG CP24, 1 capsule daily, Disp: , Rfl:     Allergies   Allergen Reactions    Penicillins Anaphylaxis            Vitals:    07/11/19 1101   BP: 142/85   Pulse: 92       Objective:                    Right Knee Exam     Muscle Strength   The patient has normal right knee strength  Range of Motion   Extension: 0   Flexion: 110     Tests   Varus: negative Valgus: negative  Lachman:  Anterior - negative    Posterior - negative    Other   Erythema: absent  Scars: absent  Sensation: normal  Pulse: present  Swelling: none  Effusion: no effusion present      Left Knee Exam     Muscle Strength   The patient has normal left knee strength  Tenderness   The patient is experiencing tenderness in the medial joint line      Range of Motion   Left knee extension: -8    Flexion: 100     Tests   Carla:  Medial - positive Lateral - negative  Varus: negative Valgus: negative  Lachman:  Anterior - negative    Posterior - negative  Drawer:  Anterior - negative       Pivot shift: negative    Other   Erythema: absent  Scars: absent  Sensation: normal  Pulse: present  Swelling: mild  Effusion: effusion (mild) present    Comments:  Popliteal cyst posterior knee   Sensation intact to light touch             Diagnostics, reviewed and taken today if performed as documented:    None performed      The attending physician has personally reviewed the pertinent films in PACS and interpretation is as follows:  MRI of left knee large complex tear of medial meniscus  Tricompartmental osteoarthritis, severe in patellofemoral compartment  Moderate effusion       Procedures, if performed today:    Procedures    None performed      Portions of the record may have been created with voice recognition software   Occasional wrong word or "sound a like" substitutions may have occurred due to the inherent limitations of voice recognition software   Read the chart carefully and recognize, using context, where substitutions have occurred

## 2019-07-12 ENCOUNTER — TELEPHONE (OUTPATIENT)
Dept: OBGYN CLINIC | Facility: HOSPITAL | Age: 49
End: 2019-07-12

## 2019-07-12 LAB
RHEUMATOID FACT SER QL LA: NEGATIVE
RYE IGE QN: NEGATIVE

## 2019-07-12 RX ORDER — DICLOFENAC POTASSIUM 50 MG/1
50 TABLET, FILM COATED ORAL 3 TIMES DAILY
Qty: 90 TABLET | Refills: 1 | Status: SHIPPED | OUTPATIENT
Start: 2019-07-12 | End: 2019-08-15

## 2019-07-13 LAB
B BURGDOR IGG SER IA-ACNC: 0.21
B BURGDOR IGM SER IA-ACNC: 0.63
CCP IGA+IGG SERPL IA-ACNC: 8 UNITS (ref 0–19)

## 2019-07-15 DIAGNOSIS — M17.0 PRIMARY OSTEOARTHRITIS OF BOTH KNEES: Primary | ICD-10-CM

## 2019-07-16 LAB — HLA-B27 QL NAA+PROBE: POSITIVE

## 2019-08-08 ENCOUNTER — OFFICE VISIT (OUTPATIENT)
Dept: OBGYN CLINIC | Facility: HOSPITAL | Age: 49
End: 2019-08-08
Payer: COMMERCIAL

## 2019-08-08 VITALS
SYSTOLIC BLOOD PRESSURE: 136 MMHG | WEIGHT: 170 LBS | HEIGHT: 62 IN | HEART RATE: 103 BPM | DIASTOLIC BLOOD PRESSURE: 90 MMHG | BODY MASS INDEX: 31.28 KG/M2

## 2019-08-08 DIAGNOSIS — G89.29 CHRONIC PAIN OF LEFT KNEE: Primary | ICD-10-CM

## 2019-08-08 DIAGNOSIS — M25.562 CHRONIC PAIN OF LEFT KNEE: Primary | ICD-10-CM

## 2019-08-08 PROCEDURE — 99213 OFFICE O/P EST LOW 20 MIN: CPT | Performed by: ORTHOPAEDIC SURGERY

## 2019-08-08 NOTE — LETTER
August 8, 2019     Patient: Farhad Sparks   YOB: 1970   Date of Visit: 8/8/2019       To Whom it May Concern:    Farhad Sparks is under my professional care  She was seen in my office on 8/8/2019  She may return to work with limitations of no climbing and no lifting more than 10lbs  She should wear her knee brace while working as needed  If you have any questions or concerns, please don't hesitate to call           Sincerely,          Michael Wallace MD        CC: Farhad Sparks

## 2019-08-08 NOTE — PROGRESS NOTES
Orthopaedic Surgery - Office Note  Reena Souza (47 y o  female)   : 1970   MRN: [id number]  Encounter Date: 2019    Chief Complaint   Patient presents with    Left Knee - Follow-up       Assessment / Plan  Left knee pain     · Referral was placed for rheumatology  · Work note was provided today with restrictions  Return for 4 weeks after rheum  History of Present Illness  Reena Souza is a 47 y o  female who presents for follow up review of her blood work regarding her left knee  She states that she has had no changes in her symptoms  She does not report any new symptoms  Review of Systems  Pertinent items are noted in HPI  All other systems were reviewed and are negative  Physical Exam  /90   Pulse 103   Ht 5' 2" (1 575 m)   Wt 77 1 kg (170 lb)   BMI 31 09 kg/m²   Cons: Appears well  No apparent distress  Psych: Alert  Oriented x3  Mood and affect normal   Eyes: PERRLA, EOMI  Resp: Normal effort  No audible wheezing or stridor  CV: Palpable pulse  No discernable arrhythmia  No LE edema  Lymph:  No palpable cervical, axillary, or inguinal lymphadenopathy  Skin: Warm  No palpable masses  No visible lesions  Neuro: Normal muscle tone  Normal and symmetric DTR's  Left Knee Exam  Alignment:  Normal knee alignment  Inspection:  No swelling  No erythema  No ecchymosis  Palpation:  medial joint line tenderness  ROM:  Knee Extension -5  Knee Flexion 100  Strength:  5/5 quadriceps and hamstrings  Stability:  No objective knee instability  Stable Varus / Valgus stress, Lachman, and Posterior drawer  Tests:  No pertinent positive or negative tests  Patella:  Normal patellar mobility  Neurovascular:  Sensation intact in DP/SP/Anderson/Sa/T nerve distributions  2+ DP & PT pulses  Brisk capillary refill in all toes  Toes warm and perfused  Gait:  Antalgic      Studies Reviewed  Labs from 19 show positive for HLA B27 and C-reactive protein was 8 7    Procedures  No procedures today  Medical, Surgical, Family, and Social History  The patient's medical history, family history, and social history, were reviewed and updated as appropriate  Past Medical History:   Diagnosis Date    Arthritis     Bacterial vaginosis     Migraine     Obesity     Varicella        History reviewed  No pertinent surgical history  Family History   Problem Relation Age of Onset    Prostate cancer Father     Cancer Father     Hypertension Mother        Social History     Occupational History    Not on file   Tobacco Use    Smoking status: Never Smoker    Smokeless tobacco: Never Used   Substance and Sexual Activity    Alcohol use: No     Comment: Per [employer]scripts;  Social use    Drug use: No    Sexual activity: Not on file       Allergies   Allergen Reactions    Penicillins Anaphylaxis         Current Outpatient Medications:     Biotin 10 MG CAPS, Take 1 capsule by mouth daily, Disp: , Rfl:     diclofenac potassium (CATAFLAM) 50 mg tablet, Take 1 tablet (50 mg total) by mouth 3 (three) times a day, Disp: 90 tablet, Rfl: 1    diclofenac sodium (VOLTAREN) 50 mg EC tablet, Take 1 tablet (50 mg total) by mouth 2 (two) times a day, Disp: 60 tablet, Rfl: 0    Omega-3 1000 MG CAPS, Take 1 capsule by mouth daily, Disp: , Rfl:     Topiramate ER (TROKENDI XR) 25 MG CP24, 1 capsule daily, Disp: , Rfl:       Eryn Patten MA    Scribe Attestation    I,:   Eryn Patten MA am acting as a scribe while in the presence of the attending physician :        I,:   Jeanna Chowdary MD personally performed the services described in this documentation    as scribed in my presence :

## 2019-08-15 ENCOUNTER — OFFICE VISIT (OUTPATIENT)
Dept: RHEUMATOLOGY | Facility: CLINIC | Age: 49
End: 2019-08-15
Payer: COMMERCIAL

## 2019-08-15 VITALS
SYSTOLIC BLOOD PRESSURE: 142 MMHG | HEIGHT: 62 IN | BODY MASS INDEX: 31.28 KG/M2 | DIASTOLIC BLOOD PRESSURE: 84 MMHG | HEART RATE: 100 BPM | WEIGHT: 170 LBS

## 2019-08-15 DIAGNOSIS — M19.90 INFLAMMATORY ARTHRITIS: ICD-10-CM

## 2019-08-15 DIAGNOSIS — G89.29 CHRONIC PAIN OF LEFT KNEE: Primary | ICD-10-CM

## 2019-08-15 DIAGNOSIS — M25.562 CHRONIC PAIN OF LEFT KNEE: Primary | ICD-10-CM

## 2019-08-15 PROCEDURE — 20610 DRAIN/INJ JOINT/BURSA W/O US: CPT | Performed by: INTERNAL MEDICINE

## 2019-08-15 PROCEDURE — 99204 OFFICE O/P NEW MOD 45 MIN: CPT | Performed by: INTERNAL MEDICINE

## 2019-08-15 RX ORDER — NAPROXEN 500 MG/1
500 TABLET ORAL 2 TIMES DAILY WITH MEALS
Qty: 60 TABLET | Refills: 3 | Status: SHIPPED | OUTPATIENT
Start: 2019-08-15 | End: 2020-03-31

## 2019-08-15 RX ORDER — TRIAMCINOLONE ACETONIDE 40 MG/ML
40 INJECTION, SUSPENSION INTRA-ARTICULAR; INTRAMUSCULAR ONCE
Status: COMPLETED | OUTPATIENT
Start: 2019-08-15 | End: 2019-08-15

## 2019-08-15 RX ORDER — BUPIVACAINE HYDROCHLORIDE 2.5 MG/ML
4 INJECTION, SOLUTION INFILTRATION; PERINEURAL ONCE
Status: COMPLETED | OUTPATIENT
Start: 2019-08-15 | End: 2019-08-15

## 2019-08-15 RX ADMIN — TRIAMCINOLONE ACETONIDE 40 MG: 40 INJECTION, SUSPENSION INTRA-ARTICULAR; INTRAMUSCULAR at 16:15

## 2019-08-15 RX ADMIN — BUPIVACAINE HYDROCHLORIDE 4 ML: 2.5 INJECTION, SOLUTION INFILTRATION; PERINEURAL at 16:15

## 2019-08-15 NOTE — PROGRESS NOTES
Assessment and Plan: Jeanna Viveros is a 47 y o  female who presents as a Rheumatology consult referred by Elmo Alfredo MD for evaluation of possible inflammatory arthritis given a positive HLA-B27 and elevated CRP  Patient's left knee MRI already reveals osteoarthritis, which may or may not be the sole cause of her current left knee pain and swelling  Diagnostic left knee arthrocentesis was attempted in clinic today to evaluate synovial fluid for inflammatory vs  Non-inflammatory causes; though aspiration of fluid was unsuccessful, an intra-articular steroid injection was given for pain relief and to help with any underlying inflammation  Given the migratory nature of patient's knee arthritis over the past few years (first involving the right knee and now involving the left knee), along with her elevated CRP and positive HLA-B27, a seronegative spondyloarthropathy/inflammatory arthritis such as reactive arthritis is on the differential  Have ordered viral hepatitis panel, ESR, and urine gonorrhea/chlamydia for possible reactive arthritis work-up  Have also ordered a Vit  D level with plan to supplement accordingly, since a low Vit  D can contribute to arthralgias  Have switched patient from diclofenac to naproxen 500mg po bid since she was having a drowsiness side effect to diclofenac  Asked her to continue to wear her left knee brace, and gave a letter to her to be able to go back to work without restrictions (since otherwise her job was not able to make accommodations for her to be able to perform her work)  Will re-evaluate how patient is doing in two weeks      Plan:  Diagnoses and all orders for this visit:    Chronic pain of left knee - secondary to osteoarthritis with or without concurrent inflammatory arthritis  -     Ambulatory referral to Rheumatology  -     bupivacaine (MARCAINE) 0 25 % injection 4 mL  -     triamcinolone acetonide (KENALOG-40) 40 mg/mL injection 40 mg  -     Sedimentation rate, automated  -     Acute Hepatitis Panel (Refl)  -     Chronic Hepatitis Panel  -     Vitamin D 25 hydroxy  -     Chlamydia/GC amplified DNA by PCR  -     naproxen (NAPROSYN) 500 mg tablet; Take 1 tablet (500 mg total) by mouth 2 (two) times a day with meals for 120 days  -     Large joint arthrocentesis: L knee    Possible inflammatory arthritis, such as reactive arthritis - elevated CRP, HLA-B27 positive  -     Sedimentation rate, automated  -     Acute Hepatitis Panel (Refl)  -     Chronic Hepatitis Panel  -     Vitamin D 25 hydroxy  -     Chlamydia/GC amplified DNA by PCR  -     naproxen (NAPROSYN) 500 mg tablet; Take 1 tablet (500 mg total) by mouth 2 (two) times a day with meals for 120 days    Large joint arthrocentesis: L knee  Date/Time: 8/15/2019 4:00 PM  Consent given by: patient  Site marked: site marked  Timeout: Immediately prior to procedure a time out was called to verify the correct patient, procedure, equipment, support staff and site/side marked as required   Supporting Documentation  Indications: pain, joint swelling and diagnostic evaluation   Procedure Details  Location: knee - L knee  Preparation: Patient was prepped and draped in the usual sterile fashion  Needle size: 18 G  Ultrasound guidance: no  Approach: anterolateral (supralateral)  Medication group details: 4mL bupivacaine and 1mL triamcinolone, ordered separately  Patient tolerance: patient tolerated the procedure well with no immediate complications  Dressing:  Sterile dressing applied    After discussing the risks/benefits of left knee aspiration, including minor risk of infection, bleeding, pain, or ineffectiveness, informed consent was obtained and patient was agreeable to proceed  Using sterile technique, the left knee was prepped with Povidone-iodine and alcohol swabs, and was topically anesthetized with Ethyl Chloride   The joint was entered using a supralateral approach after locally anesthetizing the area with 3 mL of bupivicaine and a drop of bloody fluid was withdrawn, unable to be sent for analysis  One mL of bupivacaine and 1 mL of Kenalog 40 mg was then injected and the needle withdrawn  The procedure was well tolerated  The patient was asked to continue to rest the joint for a few more days before resuming regular activities  It may be more painful for the first 1-2 days  Watch for fever, increased swelling, or persistent pain in the joint  Follow-up plan: Return to clinic in 2 weeks      HPI  Shiela Dunham is a 47 y o   female who presents as a Rheumatology consult referred by Jj Baker MD for evaluation of possible inflammatory arthritis given a positive HLA-B27 and elevated CRP  Utilized [place] phone  Lyst ID# [id number]  Patient gives a history of her left knee significantly bothering her for the past 5-6 months  It has been affecting her work  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, which requires her to do some lifting and going up and down stairs, which has been hard to do lately because of the pain  Patient states that her knees started bothering her 3 years ago, first started with right knee, then started involving her left knee  She was receiving a steroid injection in her right knee every 3 months, last injection being 6 months ago, and it is no longer bothering her that much  Her left knee especially started bothering her 5-6 months ago  She was concerned that she might have Rheumatoid arthritis since her mother has it mainly involving her knees  She has been taking diclofenac 50mg po bid prescribed by Orthopedics, which helps some with the pain but has been making her drowsy; would like to try another medication  She denies any back pain, rashes, UTIs, food poisoning history, or any illnesses that occurred around the time she started having knee pain 3 years ago   Patient was amenable to left knee diagnostic arthrocentesis attempt and steroid injection in clinic today      Review of Systems  Review of Systems   Constitutional: Negative for chills, fatigue, fever and unexpected weight change  HENT: Negative for mouth sores and trouble swallowing  Eyes: Negative for pain and visual disturbance  Respiratory: Negative for cough and shortness of breath  Cardiovascular: Negative for chest pain and leg swelling  Gastrointestinal: Negative for abdominal pain, blood in stool, constipation, diarrhea and nausea  Genitourinary: Negative for hematuria  Musculoskeletal: Positive for arthralgias and joint swelling  Negative for back pain and myalgias  Skin: Negative for rash  Neurological: Positive for numbness  Negative for weakness  Hematological: Negative for adenopathy  Psychiatric/Behavioral: Positive for dysphoric mood  Negative for sleep disturbance  Allergies  Allergies   Allergen Reactions    Penicillins Anaphylaxis       Home Medications    Current Outpatient Medications:     Biotin 10 MG CAPS, Take 1 capsule by mouth daily, Disp: , Rfl:     Omega-3 1000 MG CAPS, Take 1 capsule by mouth daily, Disp: , Rfl:     Topiramate ER (TROKENDI XR) 25 MG CP24, 1 capsule daily, Disp: , Rfl:     naproxen (NAPROSYN) 500 mg tablet, Take 1 tablet (500 mg total) by mouth 2 (two) times a day with meals for 120 days, Disp: 60 tablet, Rfl: 3  No current facility-administered medications for this visit  Past Medical History  Past Medical History:   Diagnosis Date    Arthritis     Bacterial vaginosis     Migraine     Obesity     Varicella        Past Surgical History   History reviewed  No pertinent surgical history      Family History    Family History   Problem Relation Age of Onset    Prostate cancer Father     Cancer Father     Hypertension Mother    Mother - Rheumatoid arthritis affecting her knees  Father - colon cancer      Social History  Occupation: works in a [email or web address], keeps track of inventory  Social History     Substance and Sexual Activity Alcohol Use No     Social History     Substance and Sexual Activity   Drug Use No     Social History     Tobacco Use   Smoking Status Never Smoker   Smokeless Tobacco Never Used       Objective:  Vitals:    08/15/19 1500   BP: 142/84   Pulse: 100   Weight: 77 1 kg (170 lb)   Height: 5' 2" (1 575 m)       Physical Exam   Constitutional: She appears well-developed and well-nourished  No distress  HENT:   Head: Normocephalic and atraumatic  Mouth/Throat: Oropharynx is clear and moist and mucous membranes are normal    Eyes: Conjunctivae and EOM are normal  No scleral icterus  Neck: Neck supple  No spinous process tenderness and no muscular tenderness present  No thyromegaly present  Cardiovascular: Normal rate, regular rhythm, S1 normal and S2 normal  Exam reveals no friction rub  No murmur heard  Pulmonary/Chest: Effort normal and breath sounds normal  No respiratory distress  She has no wheezes  She has no rhonchi  She has no rales  Abdominal: Soft  She exhibits no distension  There is no hepatosplenomegaly  There is no tenderness  Musculoskeletal: She exhibits tenderness  L knee swelling/warmth/tenderness   Lymphadenopathy:     She has no cervical adenopathy  Neurological: She is alert  She has normal strength  No sensory deficit  Skin: Skin is warm and dry  No rash noted  Nails show no clubbing  Psychiatric: She has a normal mood and affect         Imaging:   Left Knee MRI w/o Contrast 6/25/19  IMPRESSION:     Large complex tear of the medial meniscus posterior horn and posterior meniscal root (series 4 images 12 through 16 )     Tricompartmental osteoarthritis, severe in the patellofemoral compartment, and mild elsewhere      There is a moderate joint effusion with a 6 mm intraarticular body in the lateral aspect of the suprapatellar pouch (series 7 image 7 )      Labs:   Appointment on 07/11/2019   Component Date Value Ref Range Status    HLA B27 07/11/2019 Positive   Final    HLA-B*27 Positive  This patient is positive for HLA-B*27  This procedure rules  out the B*27:06 and 27:09 alleles, which the literature  suggests are not associated with spondyloarthropathies  B27 allele interpretation for all loci based on IMGT/HLA  database version 3 33 0  This test was developed and its performance characteristics  determined by [facility]   It has not been cleared or approved  by the Food and Drug Administration  [facility] CLIA ID Number [id number]  This test was performed using PCR (Polymerase Chain Reaction)/SSOP  (Sequence Specific Oligonucleotide Probes) technique  SBT (Sequence  Based Typing) and/or SSP (Sequence Specific Primers) may be used as  supplemental methods when necessary  Please contact [facility] Customer  Service at [phone] if you have any questions     Director of [facility] Laboratory   Dr Gerda Harrell, PhD    Cyclic Citrullin Peptide Ab 07/11/2019 8  0 - 19 units Final                              Negative               <20                            Weak positive      20 - 39                            Moderate positive  40 - 59                            Strong positive        >59    WBC 07/11/2019 7 89  4 31 - 10 16 Thousand/uL Final    RBC 07/11/2019 4 92  3 81 - 5 12 Million/uL Final    Hemoglobin 07/11/2019 14 2  11 5 - 15 4 g/dL Final    Hematocrit 07/11/2019 45 9  34 8 - 46 1 % Final    MCV 07/11/2019 93  82 - 98 fL Final    MCH 07/11/2019 28 9  26 8 - 34 3 pg Final    MCHC 07/11/2019 30 9* 31 4 - 37 4 g/dL Final    RDW 07/11/2019 12 6  11 6 - 15 1 % Final    MPV 07/11/2019 9 7  8 9 - 12 7 fL Final    Platelets 78/57/4942 277  149 - 390 Thousands/uL Final    nRBC 07/11/2019 0  /100 WBCs Final    Neutrophils Relative 07/11/2019 65  43 - 75 % Final    Immat GRANS % 07/11/2019 0  0 - 2 % Final    Lymphocytes Relative 07/11/2019 27  14 - 44 % Final    Monocytes Relative 07/11/2019 6  4 - 12 % Final    Eosinophils Relative 07/11/2019 1  0 - 6 % Final    Basophils Relative 07/11/2019 1  0 - 1 % Final    Neutrophils Absolute 07/11/2019 5 17  1 85 - 7 62 Thousands/µL Final    Immature Grans Absolute 07/11/2019 0 03  0 00 - 0 20 Thousand/uL Final    Lymphocytes Absolute 07/11/2019 2 10  0 60 - 4 47 Thousands/µL Final    Monocytes Absolute 07/11/2019 0 48  0 17 - 1 22 Thousand/µL Final    Eosinophils Absolute 07/11/2019 0 06  0 00 - 0 61 Thousand/µL Final    Basophils Absolute 07/11/2019 0 05  0 00 - 0 10 Thousands/µL Final    Sed Rate 07/11/2019 7  0 - 20 mm/hour Final    CRP 07/11/2019 8 7* <3 0 mg/L Final    LYME AB IGG 07/11/2019 0 21  0 00 - 0 79 Final    NEGATIVE(0 00-0 79)-Absence of detectable Borrelia IgG Antibodies  A negative result does not exclude the possibility of Borrelia infection  If early Lyme disease is suspected,a second sample should be collected & tested 4 weeks after initial testing   LYME AB IGM 07/11/2019 0 63  0 00 - 0 79 Final    NEGATIVE (0 00-0 79)-Absence of detectable Borrelia IgM antibodies  A negative result does not exclude the possibility of Borrelia infection  If early lyme disease is suspected, a second sample should be collected & tested 4 weeks after initial testing      FRANCES 07/11/2019 Negative  Negative Final    Rheumatoid Factor 07/11/2019 Negative  Negative Final

## 2019-08-15 NOTE — PATIENT INSTRUCTIONS
Do bloodwork and urine test  Take naproxen twice a day with meals    Return to clinic in 2 weeks    Arthritis   AMBULATORY CARE:   Arthritis  is a disease that causes inflammation in one or more joints  There are many types of arthritis, such as osteoarthritis, rheumatoid arthritis, and septic arthritis  Some types cause inflammation in the joints  Other types wear away the cartilage between joints  This makes the bones of the joint rub together when you move the joint  Your symptoms may be constant, or symptoms may come and go  Arthritis often gets worse over time and can cause permanent joint damage  Common signs and symptoms of arthritis:   · Pain, swelling, or stiffness in the joint    · Limited range of motion in the joint    · Warmth or redness over the joint    · Tenderness when you touch the joint    · Stiff joints in the morning that loosen with movement    · A creaking or grinding sound when you move the joint    · Fever  Seek care immediately if:   · You have a fever and severe joint pain or swelling  · You cannot move the affected joint  · You have severe joint pain you cannot tolerate  Contact your healthcare provider if:   · Your pain or swelling does not get better with treatment  · You have questions or concerns about your condition or care  Treatment  will depend on the type of arthritis you have and if it is severe  You may need any of the following:  · Acetaminophen  decreases pain and fever  It is available without a doctor's order  Ask how much to take and how often to take it  Follow directions  Acetaminophen can cause liver damage if not taken correctly  · NSAIDs , such as ibuprofen, help decrease swelling, pain, and fever  This medicine is available with or without a doctor's order  NSAIDs can cause stomach bleeding or kidney problems in certain people  If you take blood thinner medicine, always ask your healthcare provider if NSAIDs are safe for you   Always read the medicine label and follow directions  · Steroid medicine  helps reduce swelling and pain  · Surgery  may be needed to repair or replace a damaged joint  Manage arthritis:   · Rest your painful joint so it can heal   Your healthcare provider may recommend crutches or a walker if the affected joint is in a leg  · Apply ice or heat to the joint  Both can help decrease swelling and pain  Ice may also help prevent tissue damage  Use an ice pack, or put crushed ice in a plastic bag  Cover it with a towel and place it on your joint for 15 to 20 minutes every hour or as directed  You can apply heat for 20 minutes every 2 hours  Heat treatment includes hot packs or heat lamps  · Elevate your joint  Elevation helps reduce swelling and pain  Raise your joint above the level of your heart as often as you can  Prop your painful joint on pillows to keep it above your heart comfortably  · Go to physical or occupational therapy as directed  A physical therapist can teach you exercises to improve flexibility and range of motion  You may also be shown non-weight-bearing exercises that are safe for your joints, such as swimming  Exercise can help keep your joints flexible and reduce pain  An occupational therapist can help you learn to do your daily activities when your joints are stiff or sore  · Maintain a healthy weight  Extra weight puts increased pressure on your joints  Ask your healthcare provider what you should weigh  If you need to lose weight, he can help you create a weight loss program  Weight loss can help reduce pain and increase your ability to do your activities  The amount of exercise you do may vary each day, depending on your symptoms  · Wear flat or low-heeled shoes  This will help decrease pain and reduce pressure on your ankle, knee, and hip joints  · Use support devices as directed  You may be given splints to wear on your hands to help your joints rest and to decrease inflammation  While you sleep, use a pillow that is firm enough to support your neck and head  Other equipment  that may help you move and prevent falls:  · Orthotic shoes or insoles  help support your feet when you walk  · Crutches, a cane, or a walker  may help decrease your risk for falling  They also decrease stress on affected joints  · Devices to prevent falls  include raised toilet seats and bathtub bars to help you get up from sitting  Handrails can be placed in areas where you need balance and support  Follow up with your healthcare provider or rheumatologist as directed:  Write down your questions so you remember to ask them during your visits  © 2017 [facility] Information is for End User's use only and may not be sold, redistributed or otherwise used for commercial purposes  All illustrations and images included in CareNotes® are the copyrighted property of [employer]  or Danie Puri  The above information is an  only  It is not intended as medical advice for individual conditions or treatments  Talk to your doctor, nurse or pharmacist before following any medical regimen to see if it is safe and effective for you

## 2019-08-15 NOTE — LETTER
August 15, 2019     Patient: Joey Jorgensen   YOB: 1970   Date of Visit: 8/15/2019       To Whom it May Concern:    Joey Jorgensen is under my professional care  She was seen in my office on 8/15/2019  She may return to work on 8/18/19 with no restrictions at this time  I will be re-evaluating her in two weeks to see if she needs any accommodations in the future  If you have any questions or concerns, please don't hesitate to call           Sincerely,          Brando Hernandes MD        CC: Antonietancmadan Jameel

## 2019-08-17 ENCOUNTER — APPOINTMENT (OUTPATIENT)
Dept: LAB | Facility: MEDICAL CENTER | Age: 49
End: 2019-08-17
Payer: COMMERCIAL

## 2019-08-17 DIAGNOSIS — G89.29 CHRONIC IDIOPATHIC ANAL PAIN: ICD-10-CM

## 2019-08-17 DIAGNOSIS — K62.89 CHRONIC IDIOPATHIC ANAL PAIN: ICD-10-CM

## 2019-08-17 DIAGNOSIS — M25.562 LEFT KNEE PAIN, UNSPECIFIED CHRONICITY: Primary | ICD-10-CM

## 2019-08-17 LAB
25(OH)D3 SERPL-MCNC: 18.1 NG/ML (ref 30–100)
C TRACH DNA SPEC QL NAA+PROBE: NEGATIVE
ERYTHROCYTE [SEDIMENTATION RATE] IN BLOOD: 13 MM/HOUR (ref 0–20)
N GONORRHOEA DNA SPEC QL NAA+PROBE: NEGATIVE

## 2019-08-17 PROCEDURE — 85652 RBC SED RATE AUTOMATED: CPT | Performed by: INTERNAL MEDICINE

## 2019-08-17 PROCEDURE — 82306 VITAMIN D 25 HYDROXY: CPT | Performed by: INTERNAL MEDICINE

## 2019-08-17 PROCEDURE — 80074 ACUTE HEPATITIS PANEL: CPT

## 2019-08-17 PROCEDURE — 87491 CHLMYD TRACH DNA AMP PROBE: CPT | Performed by: INTERNAL MEDICINE

## 2019-08-17 PROCEDURE — 36415 COLL VENOUS BLD VENIPUNCTURE: CPT | Performed by: INTERNAL MEDICINE

## 2019-08-17 PROCEDURE — 86704 HEP B CORE ANTIBODY TOTAL: CPT

## 2019-08-17 PROCEDURE — 87591 N.GONORRHOEAE DNA AMP PROB: CPT | Performed by: INTERNAL MEDICINE

## 2019-08-18 LAB
HAV IGM SER QL: NORMAL
HBV CORE AB SER QL: NORMAL
HBV CORE IGM SER QL: NORMAL
HBV CORE IGM SER QL: NORMAL
HBV SURFACE AG SER QL: NORMAL
HBV SURFACE AG SER QL: NORMAL
HCV AB SER QL: NORMAL
HCV AB SER QL: NORMAL

## 2019-08-20 DIAGNOSIS — E55.9 VITAMIN D DEFICIENCY: Primary | ICD-10-CM

## 2019-08-20 RX ORDER — ERGOCALCIFEROL 1.25 MG/1
50000 CAPSULE ORAL WEEKLY
Qty: 12 CAPSULE | Refills: 1 | Status: SHIPPED | OUTPATIENT
Start: 2019-08-20 | End: 2020-01-21

## 2019-08-20 NOTE — PROGRESS NOTES
Sent weekly ergocalciferol 50,000 units po weekly prescription to pharmacy since Vit  D level very low

## 2019-08-29 ENCOUNTER — OFFICE VISIT (OUTPATIENT)
Dept: RHEUMATOLOGY | Facility: CLINIC | Age: 49
End: 2019-08-29
Payer: COMMERCIAL

## 2019-08-29 VITALS
HEIGHT: 63 IN | WEIGHT: 170 LBS | BODY MASS INDEX: 30.12 KG/M2 | SYSTOLIC BLOOD PRESSURE: 140 MMHG | DIASTOLIC BLOOD PRESSURE: 76 MMHG

## 2019-08-29 DIAGNOSIS — M25.562 LEFT KNEE PAIN, UNSPECIFIED CHRONICITY: Primary | ICD-10-CM

## 2019-08-29 DIAGNOSIS — M17.12 OSTEOARTHRITIS OF LEFT KNEE, UNSPECIFIED OSTEOARTHRITIS TYPE: ICD-10-CM

## 2019-08-29 DIAGNOSIS — S83.232S COMPLEX TEAR OF MEDIAL MENISCUS OF LEFT KNEE AS CURRENT INJURY, SEQUELA: ICD-10-CM

## 2019-08-29 PROCEDURE — 99214 OFFICE O/P EST MOD 30 MIN: CPT | Performed by: INTERNAL MEDICINE

## 2019-08-29 NOTE — LETTER
August 29, 2019     Patient: Charity Jenkins   YOB: 1970   Date of Visit: 8/29/2019       To Whom it May Concern:    Charity Jenkins is under my professional care  She was seen in my office on 8/29/2019  She may return to work on 9/8/19  If you have any questions or concerns, please don't hesitate to call           Sincerely,          Yadi Willis MD        CC: Charity Alice

## 2019-08-29 NOTE — PROGRESS NOTES
Assessment and Plan: Yennifer Lawson is a 47 y o  female who presents today for follow-up of possible concurrent inflammatory arthritis involving her left knee  Possible reactive arthritis was on the differential last visit given history of migratory arthralgia involving her knees, a positive HLA-B27, and elevated CRP; her urine gonorrhea/chlamydia and serum viral hepatitis panel returned negative  Her Vit  D level returned significantly low at 18 1, for which she was prescribed ergocalciferol 50,000 units po weekly  If patient's arthritis was at all inflammatory in nature, it would have been helped by her left knee steroid injection from last visit as well as the naproxen 500mg po bid that was prescribed, which she states she has been regularly taking  The fact that her left knee pain has not improved with these measures indicates that her left knee arthritis at this point appears to be solely osteoarthritis  She is unable to perform her duties at work, and may benefit from a thorough Physical Medicine & Rehab evaluation to get the appropriate therapy and be able to return to work  Have been able to get the patient an appointment with the physiatrist on Wednesday, 9/4/19 at [facility]  Until then, have provided patient with a work excuse letter  Have also prescribed patient diclofenac gel to use as needed, which she can alternate with naproxen; PCP can refill if helpful  Patient will also be following up with Orthopedist Dr Jerrod Madsen, who can re-evaluate patient for possible need for left knee surgery given her complex medial meniscal tear and severe patellofemoral osteoarthritis  At this time, patient does not have signs/symptoms consistent with an inflammatory arthritis  Plan:  Diagnoses and all orders for this visit:    Left knee pain, unspecified chronicity  -     Ambulatory referral to Physical Medicine Rehab; Future  -     diclofenac sodium (VOLTAREN) 1 %;  Apply 4 g topically 4 (four) times a day as needed (pain) for up to 30 days    Osteoarthritis of left knee, unspecified osteoarthritis type    Complex tear of medial meniscus of left knee as current injury, sequela    Follow-up plan: Return to clinic as needed      Rheumatic Disease Summary  Aileen Roe is a 47 y o  female who originally presented on 8/15/19 as a Rheumatology consult referred by Darcy Pickard MD for evaluation of possible inflammatory arthritis given a positive HLA-B27 and elevated CRP  Patient's left knee MRI revealed osteoarthritis but it was thought that she may also have an inflammatory arthritis  Diagnostic left knee arthrocentesis was attempted in clinic last visit to evaluate synovial fluid for inflammatory vs  Non-inflammatory causes; though aspiration of fluid was unsuccessful, but an intra-articular steroid injection was still administered for pain relief and to help with any underlying inflammation  A seronegative spondyloarthropathy/inflammatory arthritis such as reactive arthritis was on the differential given that patient had migratory arthralgia between her knees (right knee used to bother her before her current left knee)  Had ordered viral hepatitis panel, ESR, and urine gonorrhea/chlamydia for possible reactive arthritis work-up  Had also ordered a Vit  D level with plan to supplement accordingly, since a low Vit  D can contribute to arthralgias  Had switched patient from diclofenac to naproxen 500mg po bid since she was having a drowsiness side effect to diclofenac   Asked her to continue to wear her left knee brace, and gave a letter to her to be able to go back to work without restrictions to see how she did      Chief Complaint  Chief Complaint   Patient presents with    Joint Pain     left knee    Joint Swelling     left knee    Weakness - Generalized     HPI  Aileen Roe is a 47 y o   female who presents today for a two-week follow-up to assess how her left knee pain/swelling and functionality is doing since her left knee steroid injection last visit, and switching her from diclofenac to naproxen 500mg po bid  Used Georgian interpretation help by REJI Mtz today  Patient states that she has not been doing any better  She has been doing her job as an inventory supervisor for 3 years, and her left knee pain is worse with using the stairs  Patient is requesting a formal assessment and FMLA paperwork to be filled in order for her to be able to take time off from work to get the appropriate therapy and allow her left knee to heal so that she can be functional again  The following portions of the patient's history were reviewed and updated as appropriate: allergies, current medications, past family history, past medical history, past social history, past surgical history and problem list     Review of Systems:   Review of Systems   Constitutional: Negative for chills, fatigue, fever and unexpected weight change  HENT: Negative for mouth sores and trouble swallowing  Eyes: Negative for pain and visual disturbance  Respiratory: Negative for cough and shortness of breath  Cardiovascular: Positive for leg swelling  Negative for chest pain  Gastrointestinal: Positive for nausea  Negative for abdominal pain, blood in stool, constipation and diarrhea  Genitourinary: Negative for hematuria  Musculoskeletal: Positive for arthralgias and joint swelling  Negative for back pain and myalgias  Skin: Negative for rash  Neurological: Positive for headaches  Negative for weakness and numbness  Hematological: Negative for adenopathy  Psychiatric/Behavioral: Positive for dysphoric mood  Negative for sleep disturbance         Home Medications:    Current Outpatient Medications:     Biotin 10 MG CAPS, Take 1 capsule by mouth daily, Disp: , Rfl:     ergocalciferol (VITAMIN D2) 50,000 units, Take 1 capsule (50,000 Units total) by mouth once a week, Disp: 12 capsule, Rfl: 1    naproxen (NAPROSYN) 500 mg tablet, Take 1 tablet (500 mg total) by mouth 2 (two) times a day with meals for 120 days, Disp: 60 tablet, Rfl: 3    Omega-3 1000 MG CAPS, Take 1 capsule by mouth daily, Disp: , Rfl:     Topiramate ER (TROKENDI XR) 25 MG CP24, 1 capsule daily, Disp: , Rfl:     diclofenac sodium (VOLTAREN) 1 %, Apply 4 g topically 4 (four) times a day as needed (pain) for up to 30 days, Disp: 300 g, Rfl: 6    Objective:    Vitals:    08/29/19 1055   BP: 140/76   Weight: 77 1 kg (170 lb)   Height: 5' 3" (1 6 m)       Physical Exam   Constitutional: She appears well-developed and well-nourished  She is cooperative  No distress  HENT:   Head: Normocephalic and atraumatic  Mouth/Throat: Oropharynx is clear and moist and mucous membranes are normal    Eyes: Conjunctivae and EOM are normal  No scleral icterus  Neck: Neck supple  No spinous process tenderness and no muscular tenderness present  No thyromegaly present  Cardiovascular: Normal rate, regular rhythm, S1 normal and S2 normal  Exam reveals no friction rub  No murmur heard  Pulmonary/Chest: Breath sounds normal  No respiratory distress  She has no wheezes  She has no rhonchi  She has no rales  Musculoskeletal: She exhibits tenderness  Slight left knee swelling (much improved compared to last visit), still significant left knee tenderness   Lymphadenopathy:     She has no cervical adenopathy  Neurological: She is alert  No sensory deficit  Skin: Skin is warm and dry  No rash noted  Nails show no clubbing  Psychiatric: She has a normal mood and affect  Reviewed labs and imaging      Imaging:   Left Knee MRI w/o Contrast 6/25/19  IMPRESSION:  Large complex tear of the medial meniscus posterior horn and posterior meniscal root (series 4 images 12 through 16 )     Tricompartmental osteoarthritis, severe in the patellofemoral compartment, and mild elsewhere      There is a moderate joint effusion with a 6 mm intraarticular body in the lateral aspect of the suprapatellar pouch (series 7 image 7 )    Labs:   Appointment on 08/17/2019   Component Date Value Ref Range Status    Hepatitis B Surface Ag 08/17/2019 Non-reactive  Non-reactive, NonReactive - Confirmed Final    Hepatitis C Ab 08/17/2019 Non-reactive  Non-reactive Final    Hep B C IgM 08/17/2019 Non-reactive  Non-reactive Final    Hep B Core Total Ab 08/17/2019 Non-reactive  Non-reactive Final    Hepatitis B Surface Ag 08/17/2019 Non-reactive  Non-reactive, NonReactive - Confirmed Final    Hep A IgM 08/17/2019 Non-reactive  Non-reactive, Equivocal-Suggest Recollect Final    Hepatitis C Ab 08/17/2019 Non-reactive  Non-reactive Final    Hep B C IgM 08/17/2019 Non-reactive  Non-reactive Final   Office Visit on 08/15/2019   Component Date Value Ref Range Status    Sed Rate 08/17/2019 13  0 - 20 mm/hour Final    Vit D, 25-Hydroxy 08/17/2019 18 1* 30 0 - 100 0 ng/mL Final    N gonorrhoeae, DNA Probe 08/17/2019 Negative  Negative Final    Chlamydia trachomatis, DNA Probe 08/17/2019 Negative  Negative Final   Appointment on 07/11/2019   Component Date Value Ref Range Status    HLA B27 07/11/2019 Positive   Final    HLA-B*27 Positive  This patient is positive for HLA-B*27  This procedure rules  out the B*27:06 and 27:09 alleles, which the literature  suggests are not associated with spondyloarthropathies  B27 allele interpretation for all loci based on IMGT/HLA  database version 3 33 0  This test was developed and its performance characteristics  determined by [facility]   It has not been cleared or approved  by the Food and Drug Administration  [facility] CLIA ID Number [id number]  This test was performed using PCR (Polymerase Chain Reaction)/SSOP  (Sequence Specific Oligonucleotide Probes) technique  SBT (Sequence  Based Typing) and/or SSP (Sequence Specific Primers) may be used as  supplemental methods when necessary  Please contact [facility] Customer  Service at [phone] if you have any questions  Director of [facility] Laboratory   Dr Charlotte Spears, PhD    Cyclic Citrullin Peptide Ab 07/11/2019 8  0 - 19 units Final                              Negative               <20                            Weak positive      20 - 39                            Moderate positive  40 - 59                            Strong positive        >59    WBC 07/11/2019 7 89  4 31 - 10 16 Thousand/uL Final    RBC 07/11/2019 4 92  3 81 - 5 12 Million/uL Final    Hemoglobin 07/11/2019 14 2  11 5 - 15 4 g/dL Final    Hematocrit 07/11/2019 45 9  34 8 - 46 1 % Final    MCV 07/11/2019 93  82 - 98 fL Final    MCH 07/11/2019 28 9  26 8 - 34 3 pg Final    MCHC 07/11/2019 30 9* 31 4 - 37 4 g/dL Final    RDW 07/11/2019 12 6  11 6 - 15 1 % Final    MPV 07/11/2019 9 7  8 9 - 12 7 fL Final    Platelets 61/71/9858 277  149 - 390 Thousands/uL Final    nRBC 07/11/2019 0  /100 WBCs Final    Neutrophils Relative 07/11/2019 65  43 - 75 % Final    Immat GRANS % 07/11/2019 0  0 - 2 % Final    Lymphocytes Relative 07/11/2019 27  14 - 44 % Final    Monocytes Relative 07/11/2019 6  4 - 12 % Final    Eosinophils Relative 07/11/2019 1  0 - 6 % Final    Basophils Relative 07/11/2019 1  0 - 1 % Final    Neutrophils Absolute 07/11/2019 5 17  1 85 - 7 62 Thousands/µL Final    Immature Grans Absolute 07/11/2019 0 03  0 00 - 0 20 Thousand/uL Final    Lymphocytes Absolute 07/11/2019 2 10  0 60 - 4 47 Thousands/µL Final    Monocytes Absolute 07/11/2019 0 48  0 17 - 1 22 Thousand/µL Final    Eosinophils Absolute 07/11/2019 0 06  0 00 - 0 61 Thousand/µL Final    Basophils Absolute 07/11/2019 0 05  0 00 - 0 10 Thousands/µL Final    Sed Rate 07/11/2019 7  0 - 20 mm/hour Final    CRP 07/11/2019 8 7* <3 0 mg/L Final    LYME AB IGG 07/11/2019 0 21  0 00 - 0 79 Final    NEGATIVE(0 00-0 79)-Absence of detectable Borrelia IgG Antibodies  A negative result does not exclude the possibility of Borrelia infection   If early Lyme disease is suspected,a second sample should be collected & tested 4 weeks after initial testing   LYME AB IGM 07/11/2019 0 63  0 00 - 0 79 Final    NEGATIVE (0 00-0 79)-Absence of detectable Borrelia IgM antibodies  A negative result does not exclude the possibility of Borrelia infection  If early lyme disease is suspected, a second sample should be collected & tested 4 weeks after initial testing      FRANCES 07/11/2019 Negative  Negative Final    Rheumatoid Factor 07/11/2019 Negative  Negative Final

## 2019-08-29 NOTE — PATIENT INSTRUCTIONS
See physical medicine and rehab  Prescribed diclofenac gel to use as needed, can alternate with naproxen    Return to clinic as needed    Arthritis   WHAT YOU NEED TO KNOW:   Arthritis is a disease that causes inflammation in one or more joints  There are many types of arthritis, such as osteoarthritis, rheumatoid arthritis, and septic arthritis  Some types cause inflammation in the joints  Other types wear away the cartilage between joints  This makes the bones of the joint rub together when you move the joint  Your symptoms may be constant, or symptoms may come and go  Arthritis often gets worse over time and can cause permanent joint damage  DISCHARGE INSTRUCTIONS:   Return to the emergency department if:   · You have a fever and severe joint pain or swelling  · You cannot move the affected joint  · You have severe joint pain you cannot tolerate  Contact your healthcare provider if:   · Your pain or swelling does not get better with treatment  · You have questions or concerns about your condition or care  Medicines:   · Acetaminophen  decreases pain and fever  It is available without a doctor's order  Ask how much to take and how often to take it  Follow directions  Acetaminophen can cause liver damage if not taken correctly  · NSAIDs , such as ibuprofen, help decrease swelling, pain, and fever  This medicine is available with or without a doctor's order  NSAIDs can cause stomach bleeding or kidney problems in certain people  If you take blood thinner medicine, always ask your healthcare provider if NSAIDs are safe for you  Always read the medicine label and follow directions  · Steroids  reduce swelling and pain  · Prescription pain medicine  may be given  Do not wait until the pain is severe before you take your medicine  Ask your healthcare provider how to take this medicine safely  · Take your medicine as directed    Contact your healthcare provider if you think your medicine is not helping or if you have side effects  Tell him of her if you are allergic to any medicine  Keep a list of the medicines, vitamins, and herbs you take  Include the amounts, and when and why you take them  Bring the list or the pill bottles to follow-up visits  Carry your medicine list with you in case of an emergency  Follow up with your healthcare provider or rheumatologist as directed:  Write down your questions so you remember to ask them during your visits  Manage arthritis:   · Rest your painful joint so it can heal   Your healthcare provider may recommend crutches or a walker if the affected joint is in a leg  · Apply ice or heat to the joint  Both can help decrease swelling and pain  Ice may also help prevent tissue damage  Use an ice pack, or put crushed ice in a plastic bag  Cover it with a towel and place it on your joint for 15 to 20 minutes every hour or as directed  You can apply heat for 20 minutes every 2 hours  Heat treatment includes hot packs or heat lamps  · Elevate your joint  Elevation helps reduce swelling and pain  Raise your joint above the level of your heart as often as you can  Prop your painful joint on pillows to keep it above your heart comfortably  · Go to therapy as directed  A physical therapist can teach you exercises to improve flexibility and range of motion  You may also be shown non-weight-bearing exercises that are safe for your joints, such as swimming  Exercise can help keep your joints flexible and reduce pain  An occupational therapist can help you learn to do your daily activities when your joints are stiff or sore  · Maintain a healthy weight  Extra weight puts increased pressure on your joints  Ask your healthcare provider what you should weigh  If you need to lose weight, he can help you create a weight loss program  Weight loss can help reduce pain and increase your ability to do your activities   The amount of exercise you do may vary each day, depending on your symptoms  · Wear flat or low-heeled shoes  This will help decrease pain and reduce pressure on your ankle, knee, and hip joints  · Use support devices  You may be given splints to wear on your hands to help your joints rest and to decrease inflammation  While you sleep, use a pillow that is firm enough to support your neck and head  Support equipment:  The following may help you move and prevent falls:  · Orthotic shoes or insoles  help support your feet when you walk  · Crutches, a cane, or a walker  may help decrease your risk for falling  They also decrease stress on affected joints  · Devices to prevent falls  include raised toilet seats and bathtub bars to help you get up from sitting  Handrails can be placed in areas where you need balance and support  © 2017 [facility] Information is for End User's use only and may not be sold, redistributed or otherwise used for commercial purposes  All illustrations and images included in CareNotes® are the copyrighted property of [employer]  or Danie Puri  The above information is an  only  It is not intended as medical advice for individual conditions or treatments  Talk to your doctor, nurse or pharmacist before following any medical regimen to see if it is safe and effective for you    Rheumatoid Arthritis

## 2019-09-02 PROBLEM — M17.12 OSTEOARTHRITIS OF LEFT KNEE: Status: ACTIVE | Noted: 2019-09-02

## 2019-09-02 PROBLEM — S83.232A COMPLEX TEAR OF MEDIAL MENISCUS OF LEFT KNEE AS CURRENT INJURY: Status: ACTIVE | Noted: 2019-09-02

## 2019-09-02 PROBLEM — M19.90 INFLAMMATORY ARTHRITIS: Status: RESOLVED | Noted: 2019-08-15 | Resolved: 2019-09-02

## 2019-09-04 ENCOUNTER — CONSULT (OUTPATIENT)
Dept: NEUROLOGY | Facility: CLINIC | Age: 49
End: 2019-09-04
Payer: COMMERCIAL

## 2019-09-04 VITALS
WEIGHT: 179.3 LBS | HEIGHT: 63 IN | HEART RATE: 89 BPM | SYSTOLIC BLOOD PRESSURE: 136 MMHG | BODY MASS INDEX: 31.77 KG/M2 | DIASTOLIC BLOOD PRESSURE: 72 MMHG

## 2019-09-04 DIAGNOSIS — M17.11 PRIMARY OSTEOARTHRITIS OF RIGHT KNEE: Primary | ICD-10-CM

## 2019-09-04 PROCEDURE — 99204 OFFICE O/P NEW MOD 45 MIN: CPT | Performed by: PHYSICAL MEDICINE & REHABILITATION

## 2019-09-04 RX ORDER — OXYCODONE HYDROCHLORIDE AND ACETAMINOPHEN 5; 325 MG/1; MG/1
1 TABLET ORAL EVERY 6 HOURS PRN
Qty: 30 TABLET | Refills: 0 | Status: SHIPPED | OUTPATIENT
Start: 2019-09-04 | End: 2020-01-21

## 2019-09-04 RX ORDER — LIDOCAINE AND PRILOCAINE 25; 25 MG/G; MG/G
CREAM TOPICAL AS NEEDED
Qty: 30 G | Refills: 0 | Status: SHIPPED | OUTPATIENT
Start: 2019-09-04 | End: 2020-01-21

## 2019-09-04 NOTE — PROGRESS NOTES
Physical Medicine & Rehabilitation New Patient Evaluation  Evelin Bowling 47 y o  female      ASSESSMENT/PLAN:   LEFT knee OA, medial meniscal tear  - at this time, has tried multiple interventions including NSAIDs, steroid injections, hyaluronic acid injection   - will refer to PT for isometric exercises and consideration of aquatic therapy    - continue medial knee offloading brace   - trial EMLA cream PRN   - percocet 5-325mg q6h PRN - PDMP reviewed and no recent rx  - follow up orthopedics for consideration of surgical treatment  - will hold on completing FMLA paperwork for now, in case she undergoes surgery       *I have spent 45 minutes with Patient  today in which greater than 50% of this time was spent in counseling/coordination of care regarding Diagnostic results, Risks and benefits of tx options, Intructions for management and Impressions  SUBJECTIVE:  Patient presents today in the office with chief complaint of left knee pain    HPI:   Evelin Bowling 47 y o  female, who  has a past medical history of Arthritis, Bacterial vaginosis, Migraine, Obesity, and Varicella  Old records were reviewed personally  She has history of chronic left worse than right knee pain  She is currently followed by orthopedics  XR and MRI findings demonstrated severe tricompartmental OA of the left knee, as well as medial meniscal tear and baker's cyst  She has tried NSAIDs, corticosteroid injections, viscosupplementation injections  Per orthopedics, arthroscopy contraindicated due to presence of severe OA  She was referred to rheumatology for possible inflammatory arthritis in setting of positive HLA-B27 and elevated CRP, but thought unlikely due to poor response to intra-articular steroid injection  She has left knee medial offloading brace  She has tried PT in the past, last seen 2 years ago  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[employer], requiring significant walking   She has work restriction letter from other physicians, however her work has been unable to accommodate  She has been using vacation time due to inability to work  She will see orthopedic surgeon in 1-2 weeks, to discuss possible surgical intervention  Knee pain 8/10, constant, worse with weightbearing and stairs, diffuse around the knee, nonradiating, not associated with numbness/tingling  Imaging: I personally reviewed pertinent imaging  MRI left knee 6/25/19:  Large complex tear of the medial meniscus posterior horn and posterior meniscal root (series 4 images 12 through 16 )     Tricompartmental osteoarthritis, severe in the patellofemoral compartment, and mild elsewhere      There is a moderate joint effusion with a 6 mm intraarticular body in the lateral aspect of the suprapatellar pouch (series 7 image 7 )        XR left knee 3/15/19:  There is no acute fracture or dislocation      There is no joint effusion      Moderate tricompartmental osteoarthritis as evidenced by joint space narrowing, osteophyte formation and subchondral sclerosis      No lytic or blastic lesions are seen      Soft tissues are unremarkable  ROS:  No fever, chills, chest pain, SOB, cough, nausea, vomiting, diarrhea, constipation, abdominal pain, dysuria, bowel/bladder incontinence, new weakness or changes in sensation  +left knee pain, gait dysfunction  Complete ROS obtained and otherwise negative       OBJECTIVE:   /72 (BP Location: Left arm, Patient Position: Sitting, Cuff Size: Standard)   Pulse 89   Ht 5' 3" (1 6 m)   Wt 81 3 kg (179 lb 4 8 oz)   BMI 31 76 kg/m²      GEN: NAD, sitting comfortably in chair  Head: NCAT, no gross lesions  Eyes: PERRL, EOMI  Throat: clear, no thrush, MMM  Pulm: CTAB, no rales/wheezes  CV: RRR, normal s1/s2  Abd: soft, NTND  Ext: no pedal edema bilaterally, distal extremities warm and well perfused  Psych: normal affect, no agitation  Skin: no observable rashes  Neuro:  A+Ox3, fluent speech, follows commands    Motor Exam: Right Left Site Right Left Site     S Ab:  Shoulder Abductors 5 5 HF:  Hip Flexors     EF:  Elbow Flexors 5 4* KE:  Knee Extensors     EE:  Elbow Extensors 5 5 DR:  Dorsi Flexors     WE:  Wrist Extensors 5 5 EHL: Ext Santana Longus     FF:  Finger Flexors 5 5 PF:  Plantar Flexors     HI:  Hand Intrinsics      * pain limited    Left knee diffuse TTP peripatellar, medial and lateral joint line, popliteal fossa  Mild effusion noted   Normal ROM, pain with flexion past 90 degrees   +franky   -lachman   Pain with varus stress    Labs:   Lab Results   Component Value Date    WBC 7 89 07/11/2019    HGB 14 2 07/11/2019    HCT 45 9 07/11/2019    MCV 93 07/11/2019     07/11/2019     No results found for: GLUCOSE, CALCIUM, NA, K, CO2, CL, BUN, CREATININE      Past Medical History:   Diagnosis Date    Arthritis     Bacterial vaginosis     Migraine     Obesity     Varicella        Patient Active Problem List    Diagnosis Date Noted    Complex tear of medial meniscus of left knee as current injury 09/02/2019    Osteoarthritis of left knee 09/02/2019    Chronic pain of left knee 08/15/2019    Encounter for health maintenance examination in adult 05/16/2019    Primary osteoarthritis of right knee 03/15/2018       No past surgical history on file      Family History   Problem Relation Age of Onset    Prostate cancer Father     Cancer Father     Hypertension Mother        Social History   No current alcohol/tobacco    Allergies   Allergen Reactions    Penicillins Anaphylaxis         Current Outpatient Medications:     Biotin 10 MG CAPS, Take 1 capsule by mouth daily, Disp: , Rfl:     ergocalciferol (VITAMIN D2) 50,000 units, Take 1 capsule (50,000 Units total) by mouth once a week, Disp: 12 capsule, Rfl: 1    naproxen (NAPROSYN) 500 mg tablet, Take 1 tablet (500 mg total) by mouth 2 (two) times a day with meals for 120 days, Disp: 60 tablet, Rfl: 3    Omega-3 1000 MG CAPS, Take 1 capsule by mouth daily, Disp: , Rfl:     Topiramate ER (TROKENDI XR) 25 MG CP24, 1 capsule daily, Disp: , Rfl:     diclofenac sodium (VOLTAREN) 1 %, Apply 4 g topically 4 (four) times a day as needed (pain) for up to 30 days (Patient not taking: Reported on 9/4/2019), Disp: 300 g, Rfl: 6    lidocaine-prilocaine (EMLA) cream, Apply topically as needed for mild pain, Disp: 30 g, Rfl: 0    oxyCODONE-acetaminophen (PERCOCET) 5-325 mg per tablet, Take 1 tablet by mouth every 6 (six) hours as needed for moderate pain or severe painMax Daily Amount: 4 tablets, Disp: 30 tablet, Rfl: 0

## 2019-09-12 ENCOUNTER — OFFICE VISIT (OUTPATIENT)
Dept: OBGYN CLINIC | Facility: HOSPITAL | Age: 49
End: 2019-09-12
Payer: COMMERCIAL

## 2019-09-12 ENCOUNTER — EVALUATION (OUTPATIENT)
Dept: PHYSICAL THERAPY | Age: 49
End: 2019-09-12
Payer: COMMERCIAL

## 2019-09-12 VITALS
DIASTOLIC BLOOD PRESSURE: 85 MMHG | WEIGHT: 165 LBS | BODY MASS INDEX: 30.36 KG/M2 | HEIGHT: 62 IN | HEART RATE: 81 BPM | SYSTOLIC BLOOD PRESSURE: 143 MMHG

## 2019-09-12 DIAGNOSIS — M17.12 PRIMARY OSTEOARTHRITIS OF LEFT KNEE: Primary | ICD-10-CM

## 2019-09-12 DIAGNOSIS — M25.562 CHRONIC PAIN OF LEFT KNEE: ICD-10-CM

## 2019-09-12 DIAGNOSIS — G89.29 CHRONIC PAIN OF LEFT KNEE: ICD-10-CM

## 2019-09-12 DIAGNOSIS — M17.12 OSTEOARTHRITIS OF LEFT KNEE, UNSPECIFIED OSTEOARTHRITIS TYPE: Primary | ICD-10-CM

## 2019-09-12 DIAGNOSIS — M17.11 PRIMARY OSTEOARTHRITIS OF RIGHT KNEE: ICD-10-CM

## 2019-09-12 PROCEDURE — 97161 PT EVAL LOW COMPLEX 20 MIN: CPT | Performed by: PHYSICAL THERAPIST

## 2019-09-12 PROCEDURE — 99213 OFFICE O/P EST LOW 20 MIN: CPT | Performed by: ORTHOPAEDIC SURGERY

## 2019-09-12 NOTE — LETTER
September 12, 2019     Patient: Shiela Dunham   YOB: 1970   Date of Visit: 9/12/2019       To Whom it May Concern:    Shiela Dunham is under my professional care  She was seen in my office on 9/12/2019  She may return to work with limitations of no climbing and no lifting more than 10lbs  She should wear her knee brace while working as needed  If you have any questions or concerns, please don't hesitate to call           Sincerely,          Nathen Uriarte MD

## 2019-09-12 NOTE — PROGRESS NOTES
47 y o female presents to the office for follow up of left knee osteoarthritis  Since last visit she has been seen by rheumatology and received a left knee cortisone injection  The cortisone injection increased her pain  She continues to experience global knee pain with catching sensations  She wears a knee brace for comfort  She uses diclofenac cream and naproxen to control her pain  Review of Systems  Review of systems negative unless otherwise specified in HPI    Past Medical History  Past Medical History:   Diagnosis Date    Arthritis     Bacterial vaginosis     Migraine     Obesity     Varicella        Past Surgical History  History reviewed  No pertinent surgical history  Current Medications  Current Outpatient Medications on File Prior to Visit   Medication Sig Dispense Refill    Biotin 10 MG CAPS Take 1 capsule by mouth daily      ergocalciferol (VITAMIN D2) 50,000 units Take 1 capsule (50,000 Units total) by mouth once a week 12 capsule 1    lidocaine-prilocaine (EMLA) cream Apply topically as needed for mild pain 30 g 0    Omega-3 1000 MG CAPS Take 1 capsule by mouth daily      oxyCODONE-acetaminophen (PERCOCET) 5-325 mg per tablet Take 1 tablet by mouth every 6 (six) hours as needed for moderate pain or severe painMax Daily Amount: 4 tablets 30 tablet 0    Topiramate ER (TROKENDI XR) 25 MG CP24 1 capsule daily      diclofenac sodium (VOLTAREN) 1 % Apply 4 g topically 4 (four) times a day as needed (pain) for up to 30 days (Patient not taking: Reported on 9/4/2019) 300 g 6    naproxen (NAPROSYN) 500 mg tablet Take 1 tablet (500 mg total) by mouth 2 (two) times a day with meals for 120 days 60 tablet 3     No current facility-administered medications on file prior to visit          Recent Labs [facility])  0   Lab Value Date/Time    HCT 45 9 07/11/2019 1134    HGB 14 2 07/11/2019 1134    WBC 7 89 07/11/2019 1134    ESR 13 08/17/2019 1043    CRP 8 7 (H) 07/11/2019 1134         Physical exam  · General: Awake, Alert, Oriented  · Eyes: Pupils equal, round and reactive to light  · Heart: regular rate and rhythm  · Lungs: No audible wheezing  · Abdomen: soft  Left knee  · Patient ambulates without assistance  · Skin intact  · Extension 0°, flexion full  · Tender palpation over medial joint line  · No knee effusion  · Knee stable in sagittal and coronal planes  · 5 out of 5 quad and hamstring strength  · Sensation intact L1-S1  Assessment/Plan:   47 y  o female with left knee osteoarthritis will start physical therapy  She may use the naproxen or the diclofenac cream, but not both at the same time  Work note provided for no climbing or lifting more than 10 pounds and allowance to wear her knee brace  Consider discussion of left knee arthroscopy to remove meniscus tear if pain persists  We will see her back in 4 weeks       Scribe Attestation    I,:   [employer] am acting as a scribe while in the presence of the attending physician :        I,:   Claire Valenzuela MD personally performed the services described in this documentation    as scribed in my presence :

## 2019-09-12 NOTE — PROGRESS NOTES
PT Evaluation     Today's date: 2019  Patient name: Meriam Schilder  : 1970  MRN: [id number]  Referring provider: Alena Tapia MD  Dx: No diagnosis found  Assessment  Assessment details: Pt presents to PT s/p L knee OA and meniscal tear as confirmed by MRI  Pt has significant limitations in L knee ROM and strength that is worse with walking, ascending/descending stairs  Pt did not tolerate evaluation well as she had pain with ROM and strength examination and had difficulty with peforming HEP  Pt may benefit from aqua therapy but progress will be limited by activity tolerance     Impairments: abnormal coordination, abnormal gait, abnormal muscle firing, abnormal or restricted ROM, abnormal movement, activity intolerance, impaired balance, impaired physical strength, lacks appropriate home exercise program, pain with function, poor posture  and poor body mechanics  Functional limitations: walking, ascending/descending stairs  Symptom irritability: high  Goals  In 4 weeks pt will:  -Be independent with phase I of HEP  -improve L knee strength by 1/2 grade  -improve L knee PROM by 10 degrees    By discharge pt will:  -Be independent with Phase II of HEP  -improve L knee strength by 1 grade  -improve L knee PROM by 20 degrees      Plan  Patient would benefit from: skilled physical therapy  Planned modality interventions: TENS, cryotherapy and thermotherapy: hydrocollator packs  Planned therapy interventions: joint mobilization, manual therapy, aquatic therapy, behavior modification, body mechanics training, muscle pump exercises, neuromuscular re-education, therapeutic activities, therapeutic exercise, patient education, functional ROM exercises, graded activity, graded exercise and home exercise program  Frequency: 2x week  Duration in weeks: 6  Plan of Care beginning date: 2019  Plan of Care expiration date: 10/24/2019  Treatment plan discussed with: patient        Subjective Evaluation    History of Present Illness  Date of onset: 2018  Mechanism of injury: Pt reports history of pain in R knee, reports falling on L knee  that caused current pain  Pt had MRI on L knee that confirmed meniscal tear  Pt reports clicking, grinding and buckling of L knee with ambulation and has begun wearing offloading brace on L knee     Pain  Current pain ratin  At best pain ratin  At worst pain ratin  Quality: grinding  Aggravating factors: sitting, standing and stair climbing    Patient Goals  Patient goals for therapy: decreased edema, decreased pain, improved balance, increased motion, increased strength and independence with ADLs/IADLs          Objective     Active Range of Motion     Additional Active Range of Motion Details  Pt stated she could not flex knee when asked    Passive Range of Motion   Left Knee   Flexion: 60 degrees   Extension: 161 degrees     Right Knee   Extension: 175 degrees   Left Ankle/Foot    Dorsiflexion (kf): 11 degrees   Plantar flexion: WFL    Right Ankle/Foot    Dorsiflexion (kf): 16 degrees    Plantar flexion: WFL    Strength/Myotome Testing     Left Hip   Planes of Motion   Abduction: 4    Right Hip   Planes of Motion   Abduction: 4    Left Knee   Flexion: 3+  Extension: 3+  Quadriceps contraction: poor    Right Knee   Flexion: 4  Extension: 4  Quadriceps contraction: fair    Tests     Additional Tests Details  Unable to perform special tests due to pt's pain level      Flowsheet Rows      Most Recent Value   PT/OT G-Codes   Current Score  30   Projected Score  52             Precautions: OA B knees    Manual                                                                                   Exercise Diary Modalities

## 2019-09-16 ENCOUNTER — OFFICE VISIT (OUTPATIENT)
Dept: PHYSICAL THERAPY | Age: 49
End: 2019-09-16
Payer: COMMERCIAL

## 2019-09-16 DIAGNOSIS — M17.12 OSTEOARTHRITIS OF LEFT KNEE, UNSPECIFIED OSTEOARTHRITIS TYPE: Primary | ICD-10-CM

## 2019-09-16 PROCEDURE — 97110 THERAPEUTIC EXERCISES: CPT | Performed by: PHYSICAL THERAPIST

## 2019-09-16 NOTE — PROGRESS NOTES
Daily Note     Today's date: 2019  Patient name: Adwoa Drummond  : 1970  MRN: [id number]  Referring provider: Guerda Rose MD  Dx:   Encounter Diagnosis     ICD-10-CM    1  Osteoarthritis of left knee, unspecified osteoarthritis type M17 12                   Subjective: Pt reports pain      Objective: See treatment diary below      Assessment: Tolerated treatment poor  Patient demonstrated fatigue post treatment, would benefit from continued PT and pt continues to have decreased L knee ROM  Pt has pain with active and passive L knee flexion  Pt couldn't perform heel raises/toe raises, mini squats without pain  Plan: Continue per plan of care  Progress treatment as tolerated         Precautions: OA B knees    Manual                                                                                   Exercise Diary              Heel slides x30            Hip abd x30 ea            SL fall out x30 ea             Knee flex in sitting YTB x20                                                                                                                                                                                                                                Modalities

## 2019-09-24 ENCOUNTER — OFFICE VISIT (OUTPATIENT)
Dept: PHYSICAL THERAPY | Age: 49
End: 2019-09-24
Payer: COMMERCIAL

## 2019-09-24 DIAGNOSIS — M17.12 OSTEOARTHRITIS OF LEFT KNEE, UNSPECIFIED OSTEOARTHRITIS TYPE: Primary | ICD-10-CM

## 2019-09-24 PROCEDURE — 97113 AQUATIC THERAPY/EXERCISES: CPT | Performed by: PHYSICAL THERAPIST

## 2019-09-24 NOTE — PROGRESS NOTES
Daily Note     Today's date: 2019  Patient name: Rosalina Hinton  : 1970  MRN: [id number]  Referring provider: Jo Cochran MD  Dx:   Encounter Diagnosis     ICD-10-CM    1  Osteoarthritis of left knee, unspecified osteoarthritis type M17 12                   Subjective: Pt states pain was unchanged after last session      Objective: See treatment diary below      Assessment: Tolerated treatment well  Patient demonstrated fatigue post treatment, would benefit from continued PT and pt had minimal pain with exercises in pool for first time  Plan: Continue per plan of care  Progress treatment as tolerated  Precautions: OA B knees    Manual                                                                                   Exercise Diary              Heel slides x30            Hip abd x30 ea            SL fall out x30 ea             Knee flex in sitting YTB x20                         Pool             5x lap ROBIBNS/CD  Comp             Hip flex/ext/abd  x30 ea           HS/piriformis stretch  3x30s ea           Pool pony cycle  5 min                                                                                                                                                 Modalities

## 2019-09-26 ENCOUNTER — OFFICE VISIT (OUTPATIENT)
Dept: PHYSICAL THERAPY | Age: 49
End: 2019-09-26
Payer: COMMERCIAL

## 2019-09-26 DIAGNOSIS — M17.12 OSTEOARTHRITIS OF LEFT KNEE, UNSPECIFIED OSTEOARTHRITIS TYPE: Primary | ICD-10-CM

## 2019-09-26 PROCEDURE — 97113 AQUATIC THERAPY/EXERCISES: CPT | Performed by: PHYSICAL THERAPIST

## 2019-09-26 NOTE — PROGRESS NOTES
Daily Note     Today's date: 2019  Patient name: Meryle Pleasant  : 1970  MRN: [id number]  Referring provider: Kasi Jolley MD  Dx:   Encounter Diagnosis     ICD-10-CM    1  Osteoarthritis of left knee, unspecified osteoarthritis type M17 12                   Subjective: pt reports some L knee pain "due to rain"      Objective: See treatment diary below      Assessment: Tolerated treatment fair  Patient would benefit from continued PT and needs VC to perform exercises, with minimal pain following aquatic therapy  Plan: Continue per plan of care  Progress treatment as tolerated  Precautions: OA B knees    Manual                                                                                   Exercise Diary              Heel slides x30            Hip abd x30 ea            SL fall out x30 ea             Knee flex in sitting YTB x20                         Pool             5x lap ROBBINS/CD  Comp  comp            Hip flex/ext/abd  x30 ea x30 ea           HS/piriformis stretch  3x30s ea 3x30s ea          Pool pony cycle  5 min 5'          LAQ   x30 ea                                                                                                                                   Modalities

## 2019-09-30 ENCOUNTER — OFFICE VISIT (OUTPATIENT)
Dept: PHYSICAL THERAPY | Age: 49
End: 2019-09-30
Payer: COMMERCIAL

## 2019-09-30 DIAGNOSIS — M17.11 PRIMARY OSTEOARTHRITIS OF RIGHT KNEE: ICD-10-CM

## 2019-09-30 DIAGNOSIS — M17.12 OSTEOARTHRITIS OF LEFT KNEE, UNSPECIFIED OSTEOARTHRITIS TYPE: Primary | ICD-10-CM

## 2019-09-30 PROCEDURE — 97113 AQUATIC THERAPY/EXERCISES: CPT | Performed by: SPECIALIST/TECHNOLOGIST

## 2019-09-30 NOTE — PROGRESS NOTES
Daily Note     Today's date: 2019  Patient name: Christopher Randle  : 1970  MRN: [id number]  Referring provider: Attila Edmonds MD  Dx:   Encounter Diagnosis     ICD-10-CM    1  Osteoarthritis of left knee, unspecified osteoarthritis type M17 12    2  Primary osteoarthritis of right knee M17 11                   Subjective: Pt reports she had increased L knee pain over the weekend- cannot attributes to a causative source  Objective: See treatment diary below      Assessment: Pt requires frequent cuing for correct repetition counting to prevent excessive repetitions  Pt demonstrates improved mobility and exercise capacity in pool to progress dynamic and CKC therex to improve B knee pain  Tolerated treatment well  Patient demonstrated fatigue post treatment, exhibited good technique with therapeutic exercises and would benefit from continued PT      Plan: Continue per plan of care  Progress treatment as tolerated  Precautions: OA B knees    Manual                                                                                   Exercise Diary              Heel slides x30            Hip abd x30 ea            SL fall out x30 ea             Knee flex in sitting YTB x20                         Pool            5x lap ROBBINS/CD  Comp  comp  comp           Hip flex/ext/abd  x30 ea x30 ea  x30 ea         HS/piriformis stretch  3x30s ea 3x30s ea 5x30"         Pool pony cycle  5 min 5' 5'         LAQ   x30 ea x30 ea                                                                                                                                  Modalities

## 2019-10-02 ENCOUNTER — OFFICE VISIT (OUTPATIENT)
Dept: PHYSICAL THERAPY | Age: 49
End: 2019-10-02
Payer: COMMERCIAL

## 2019-10-02 DIAGNOSIS — M17.12 OSTEOARTHRITIS OF LEFT KNEE, UNSPECIFIED OSTEOARTHRITIS TYPE: Primary | ICD-10-CM

## 2019-10-02 PROCEDURE — 97113 AQUATIC THERAPY/EXERCISES: CPT | Performed by: PHYSICAL THERAPIST

## 2019-10-02 NOTE — PROGRESS NOTES
Daily Note     Today's date: 10/2/2019  Patient name: Reno Briones  : 1970  MRN: [id number]  Referring provider: Florentin Contreras MD  Dx:   Encounter Diagnosis     ICD-10-CM    1  Osteoarthritis of left knee, unspecified osteoarthritis type M17 12                   Subjective: Pt reports no changes      Objective: See treatment diary below      Assessment: Tolerated treatment well  Patient would benefit from continued PT and pt has pain in anterior/posterior knee with exercises that require knee flexion past 90 degrees  Plan: Continue per plan of care  Progress treatment as tolerated  Precautions: OA B knees    Manual                                                                                   Exercise Diary              Heel slides x30            Hip abd x30 ea            SL fall out x30 ea             Knee flex in sitting YTB x20                         Pool   9/26 9/30 10/2        5x lap ROBBINS/CD  Comp  comp  comp  Comp          Hip flex/ext/abd  x30 ea x30 ea  x30 ea x30 ea        HS/piriformis stretch  3x30s ea 3x30s ea 5x30" 5x30"        Pool pony cycle  5 min 5' 5' 5'        LAQ   x30 ea x30 ea x30 ea                                                                                                                                 Modalities

## 2019-10-07 ENCOUNTER — OFFICE VISIT (OUTPATIENT)
Dept: PHYSICAL THERAPY | Age: 49
End: 2019-10-07
Payer: COMMERCIAL

## 2019-10-07 DIAGNOSIS — M17.12 OSTEOARTHRITIS OF LEFT KNEE, UNSPECIFIED OSTEOARTHRITIS TYPE: Primary | ICD-10-CM

## 2019-10-07 PROCEDURE — 97113 AQUATIC THERAPY/EXERCISES: CPT

## 2019-10-07 NOTE — PROGRESS NOTES
Daily Note     Today's date: 10/7/2019  Patient name: Faiza Rhodes  : 1970  MRN: [id number]  Referring provider: [place], MD  Dx:   Encounter Diagnosis     ICD-10-CM    1  Osteoarthritis of left knee, unspecified osteoarthritis type M17 12                   Subjective: without additional complaints  Objective: See treatment diary below      Assessment: Tolerated treatment well  Patient would benefit from continued PT      Plan: Continue per plan of care  Precautions: OA B knees    Manual                                                                                   Exercise Diary              Heel slides x30            Hip abd x30 ea            SL fall out x30 ea             Knee flex in sitting YTB x20                         Pool   9/26 9/30 10/2 10/7       5x lap ROBBINS/CD  Comp  comp  comp  Comp          Hip flex/ext/abd  x30 ea x30 ea  x30 ea x30 ea 30x       HS/piriformis stretch  3x30s ea 3x30s ea 5x30" 5x30" 30sec x 5       Pool pony cycle  5 min 5' 5' 5' 5 min       LAQ   x30 ea x30 ea x30 ea 30x                                                                                                                                Modalities

## 2019-10-09 ENCOUNTER — OFFICE VISIT (OUTPATIENT)
Dept: PHYSICAL THERAPY | Age: 49
End: 2019-10-09
Payer: COMMERCIAL

## 2019-10-09 DIAGNOSIS — M17.12 OSTEOARTHRITIS OF LEFT KNEE, UNSPECIFIED OSTEOARTHRITIS TYPE: Primary | ICD-10-CM

## 2019-10-09 PROCEDURE — 97113 AQUATIC THERAPY/EXERCISES: CPT | Performed by: PHYSICAL THERAPIST

## 2019-10-09 NOTE — PROGRESS NOTES
Daily Note     Today's date: 10/9/2019  Patient name: Sudarshan Whiting  : 1970  MRN: [id number]  Referring provider: Fish Thompson MD  Dx: No diagnosis found  Subjective: Pt reports minimal changes  Objective: See treatment diary below      Assessment: Tolerated treatment well  Patient demonstrated fatigue post treatment, would benefit from continued PT and has less pain with ambulation  Plan: Continue per plan of care  Progress treatment as tolerated  Precautions: OA B knees    Manual                                                                                   Exercise Diary              Heel slides x30            Hip abd x30 ea            SL fall out x30 ea             Knee flex in sitting YTB x20                         Pool   9/26 9/30 10/2 10/7 10/9      5x lap ROBBINS/CD  Comp  comp  comp  Comp         Hip flex/ext/abd  x30 ea x30 ea  x30 ea x30 ea 30x 30x      HS/piriformis stretch  3x30s ea 3x30s ea 5x30" 5x30" 30sec x 5 30sec x 5      Pool pony cycle  5 min 5' 5' 5' 5 min 5 min      LAQ   x30 ea x30 ea x30 ea 30x 30x                                                                                                                               Modalities

## 2019-10-10 ENCOUNTER — OFFICE VISIT (OUTPATIENT)
Dept: OBGYN CLINIC | Facility: HOSPITAL | Age: 49
End: 2019-10-10
Payer: COMMERCIAL

## 2019-10-10 VITALS
HEART RATE: 80 BPM | DIASTOLIC BLOOD PRESSURE: 83 MMHG | SYSTOLIC BLOOD PRESSURE: 131 MMHG | WEIGHT: 165 LBS | BODY MASS INDEX: 30.36 KG/M2 | HEIGHT: 62 IN

## 2019-10-10 DIAGNOSIS — S83.232A COMPLEX TEAR OF MEDIAL MENISCUS OF LEFT KNEE AS CURRENT INJURY, INITIAL ENCOUNTER: ICD-10-CM

## 2019-10-10 DIAGNOSIS — G89.29 CHRONIC PAIN OF LEFT KNEE: ICD-10-CM

## 2019-10-10 DIAGNOSIS — M25.562 CHRONIC PAIN OF LEFT KNEE: ICD-10-CM

## 2019-10-10 DIAGNOSIS — M17.12 PRIMARY OSTEOARTHRITIS OF LEFT KNEE: Primary | ICD-10-CM

## 2019-10-10 PROCEDURE — 99213 OFFICE O/P EST LOW 20 MIN: CPT | Performed by: ORTHOPAEDIC SURGERY

## 2019-10-10 NOTE — PROGRESS NOTES
47 y o female presents to the office for follow up of left knee osteoarthritis and medial meniscus tear  She has been attending physical therapy and wearing a knee brace with improvement  She does note fluctuation in severity of pain  She does experience cracking and popping  She does note feelings of instability at times  She feels as though she cannot function without the brace  Her pain does wake her up at night occasionally  Translation is provided by BOWEN Zaragoza  Review of Systems  Review of systems negative unless otherwise specified in HPI    Past Medical History  Past Medical History:   Diagnosis Date    Arthritis     Bacterial vaginosis     Migraine     Obesity     Varicella        Past Surgical History  History reviewed  No pertinent surgical history  Current Medications  Current Outpatient Medications on File Prior to Visit   Medication Sig Dispense Refill    Biotin 10 MG CAPS Take 1 capsule by mouth daily      ergocalciferol (VITAMIN D2) 50,000 units Take 1 capsule (50,000 Units total) by mouth once a week 12 capsule 1    lidocaine-prilocaine (EMLA) cream Apply topically as needed for mild pain 30 g 0    Omega-3 1000 MG CAPS Take 1 capsule by mouth daily      Topiramate ER (TROKENDI XR) 25 MG CP24 1 capsule daily      diclofenac sodium (VOLTAREN) 1 % Apply 4 g topically 4 (four) times a day as needed (pain) for up to 30 days (Patient not taking: Reported on 9/4/2019) 300 g 6    naproxen (NAPROSYN) 500 mg tablet Take 1 tablet (500 mg total) by mouth 2 (two) times a day with meals for 120 days (Patient not taking: Reported on 10/10/2019) 60 tablet 3    oxyCODONE-acetaminophen (PERCOCET) 5-325 mg per tablet Take 1 tablet by mouth every 6 (six) hours as needed for moderate pain or severe painMax Daily Amount: 4 tablets (Patient not taking: Reported on 10/10/2019) 30 tablet 0     No current facility-administered medications on file prior to visit          Recent Labs (HCT,HGB,PT,INR,ESR,CRP,GLU,HgA1C)  0   Lab Value Date/Time    HCT 45 9 07/11/2019 1134    HGB 14 2 07/11/2019 1134    WBC 7 89 07/11/2019 1134    ESR 13 08/17/2019 1043    CRP 8 7 (H) 07/11/2019 1134         Physical exam  · General: Awake, Alert, Oriented  · Eyes: Pupils equal, round and reactive to light  · Heart: regular rate and rhythm  · Lungs: No audible wheezing  · Abdomen: soft  Left knee  · Patient ambulates without assistance   · Skin intact  · Extension 0°, flexion full  · Tender palpation over medial joint line  · No knee effusion  · Knee stable in sagittal and coronal planes  · 5 out of 5 quad and hamstring strength  · Sensation intact L1-S1  Assessment/Plan:   47 y  o female with left knee osteoarthritis and medial meniscus tear will continue physical therapy and use of brace  Medial  brace was fitted and provided today int he office  At this time most of her symptoms are related to her left knee osteoarthritis and may not be improved with left knee arthroscopy  She will try the medial  brace for 4 weeks  If no improvement we will consider left knee arthroscopy  Follow up in 4 weeks        Scribe Attestation    I,:   Jenny Wallace am acting as a scribe while in the presence of the attending physician :        I,:   Denisa Hickman MD personally performed the services described in this documentation    as scribed in my presence :

## 2019-10-21 ENCOUNTER — EVALUATION (OUTPATIENT)
Dept: PHYSICAL THERAPY | Age: 49
End: 2019-10-21
Payer: COMMERCIAL

## 2019-10-21 DIAGNOSIS — M17.12 OSTEOARTHRITIS OF LEFT KNEE, UNSPECIFIED OSTEOARTHRITIS TYPE: Primary | ICD-10-CM

## 2019-10-21 PROCEDURE — 97750 PHYSICAL PERFORMANCE TEST: CPT | Performed by: PHYSICAL THERAPIST

## 2019-10-21 PROCEDURE — 97113 AQUATIC THERAPY/EXERCISES: CPT | Performed by: PHYSICAL THERAPIST

## 2019-10-21 NOTE — PROGRESS NOTES
PT Evaluation     Today's date: 10/21/2019  Patient name: Elisha Hernández  : 1970  MRN: [id number]  Referring provider: Melchor Patel MD  Dx:   Encounter Diagnosis     ICD-10-CM    1  Osteoarthritis of left knee, unspecified osteoarthritis type M17 12                   Assessment  Assessment details: Pt has made improvements in LE strength and ROM but continues to have pain with ADLs and WB activity  Pt continues to be limited in B hip strength and L knee ROM  Pt will continue to benefit from skilled PT in order to improve LE strength and ROM and decrease pain with ADLs     Impairments: abnormal coordination, abnormal gait, abnormal muscle firing, abnormal or restricted ROM, abnormal movement, activity intolerance, impaired balance, impaired physical strength, lacks appropriate home exercise program, pain with function, poor posture  and poor body mechanics  Functional limitations: walking, ascending/descending stairs  Symptom irritability: high  Goals  In 4 weeks pt will:  -Be independent with phase I of HEP-met  -improve L knee strength by 1/2 grade-met  -improve L knee PROM by 10 degrees-partially met    By discharge pt will:  -Be independent with Phase II of HEP-progressing, continue  -improve L knee strength by 1 grade-progressing, continue  -improve L knee PROM by 20 degrees-progressing, continue      Plan  Patient would benefit from: skilled physical therapy  Planned modality interventions: TENS, cryotherapy and thermotherapy: hydrocollator packs  Planned therapy interventions: joint mobilization, manual therapy, aquatic therapy, behavior modification, body mechanics training, muscle pump exercises, neuromuscular re-education, therapeutic activities, therapeutic exercise, patient education, functional ROM exercises, graded activity, graded exercise and home exercise program  Frequency: 2x week  Duration in weeks: 6  Plan of Care beginning date: 10/21/2019  Plan of Care expiration date: 2019  Treatment plan discussed with: patient        Subjective Evaluation    History of Present Illness  Date of onset: 2018  Mechanism of injury:    Pain  Current pain ratin  At best pain ratin  At worst pain ratin  Quality: grinding  Aggravating factors: sitting, standing and stair climbing    Patient Goals  Patient goals for therapy: decreased pain, improved balance, increased motion, increased strength, independence with ADLs/IADLs and decreased edema          Objective     Passive Range of Motion   Left Knee   Flexion: 100 degrees     Additional Passive Range of Motion Details  Lacks 10 degrees of knee extension    Strength/Myotome Testing     Left Hip   Planes of Motion   Abduction: 4+    Left Knee   Extension: 5  Quadriceps contraction: poor             Precautions: OA B knees    Manual                                                                                   Exercise Diary                                                                                                                                                                                                                                                                                      Modalities

## 2019-10-23 ENCOUNTER — OFFICE VISIT (OUTPATIENT)
Dept: PHYSICAL THERAPY | Age: 49
End: 2019-10-23
Payer: COMMERCIAL

## 2019-10-23 DIAGNOSIS — M17.12 OSTEOARTHRITIS OF LEFT KNEE, UNSPECIFIED OSTEOARTHRITIS TYPE: Primary | ICD-10-CM

## 2019-10-23 PROCEDURE — 97113 AQUATIC THERAPY/EXERCISES: CPT

## 2019-10-23 NOTE — PROGRESS NOTES
Daily Note     Today's date: 10/23/2019  Patient name: Brian Ahuja  : 1970  MRN: [id number]  Referring provider: Omer Hanna MD  Dx:   Encounter Diagnosis     ICD-10-CM    1  Osteoarthritis of left knee, unspecified osteoarthritis type M17 12                   Subjective: Without additional complaints today  Objective: See treatment diary below      Assessment: Tolerated treatment well  Patient would benefit from continued PT      Plan: Continue per plan of care  Precautions: OA B knees    Exercise Diary              Heel slides x30            Hip abd x30 ea            SL fall out x30 ea             Knee flex in sitting YTB x20                         Pool   9/26 9/30 10/2 10/7 10/9 1023     5x lap ROBBINS/CD  Comp  comp  comp  Comp    5     Hip flex/ext/abd  x30 ea x30 ea  x30 ea x30 ea 30x 30x 30x     HS/piriformis stretch  3x30s ea 3x30s ea 5x30" 5x30" 30sec x 5 30sec x 5 30sec x 5     Pool pony cycle  5 min 5' 5' 5' 5 min 5 min 5 min     LAQ   x30 ea x30 ea x30 ea 30x 30x 30x                                                                                                                              Modalities                                                            Manual

## 2019-10-25 ENCOUNTER — TELEPHONE (OUTPATIENT)
Dept: OBGYN CLINIC | Facility: HOSPITAL | Age: 49
End: 2019-10-25

## 2019-10-25 NOTE — TELEPHONE ENCOUNTER
Caller:  Jorge Rodriguez  Call back: [phone]    Via interpretor:    Patient is calling in reference to disability paperwork  Patient states that she spoke with Gris on Monday and Gris assured her that the paperwork was completed and forwarded to the insurance company  Patient states the insurance company told her that as of today they have NOT received the paperwork  Patient is very concerned that the paperwork be forwarded to the insurance company  Patient is concerned that she will lose her job  They have received the paperwork covering 8/08/19 to 9/11/19  She needs the paperwork to cover the time frame of treatment from 9/12/19 until her next appointment  Please advise

## 2019-10-30 ENCOUNTER — OFFICE VISIT (OUTPATIENT)
Dept: PHYSICAL THERAPY | Age: 49
End: 2019-10-30
Payer: COMMERCIAL

## 2019-10-30 DIAGNOSIS — M17.11 PRIMARY OSTEOARTHRITIS OF RIGHT KNEE: ICD-10-CM

## 2019-10-30 DIAGNOSIS — M17.12 OSTEOARTHRITIS OF LEFT KNEE, UNSPECIFIED OSTEOARTHRITIS TYPE: Primary | ICD-10-CM

## 2019-10-30 PROCEDURE — 97113 AQUATIC THERAPY/EXERCISES: CPT | Performed by: SPECIALIST/TECHNOLOGIST

## 2019-10-30 NOTE — PROGRESS NOTES
Daily Note     Today's date: 10/30/2019  Patient name: Sudarshan Whiting  : 1970  MRN: [id number]  Referring provider: Fish Thompson MD  Dx:   Encounter Diagnosis     ICD-10-CM    1  Osteoarthritis of left knee, unspecified osteoarthritis type M17 12    2  Primary osteoarthritis of right knee M17 11                   Subjective: Pt reports slightly improved L knee pain  Objective: See treatment diary below    Assessment: Added exercise progressions and resistance to pt tolerance this visit per PT Will Updegrove's discretion  Pt demonstrates good tolerance to CKC therex this visit, improved mobility and exercise capability in pool vs land  Tolerated treatment well  Patient demonstrated fatigue post treatment, exhibited good technique with therapeutic exercises and would benefit from continued PT    Plan: Continue per plan of care  Progress treatment as tolerated  Precautions: OA B knees    Exercise Diary              Heel slides x30            Hip abd x30 ea            SL fall out x30 ea             Knee flex in sitting YTB x20                         Pool   9/26 9/30 10/2 10/7 10/9 1023 10/30    5x lap ROBBINS/CD  Comp  comp  comp  Comp    5 5    Hip flex/ext/abd  x30 ea x30 ea  x30 ea x30 ea 30x 30x 30x 30x    HS/piriformis stretch  3x30s ea 3x30s ea 5x30" 5x30" 30sec x 5 30sec x 5 30sec x 5 30sec x5    Pool pony cycle  5 min 5' 5' 5' 5 min 5 min 5 min 5 min blue cuff    LAQ   x30 ea x30 ea x30 ea 30x 30x 30x 30x blue cuff                 HS Curl         30x    Mini Squat         30x    Side stepping         5x    Marching         30x                                                            Modalities                                                            Manual

## 2019-11-01 ENCOUNTER — OFFICE VISIT (OUTPATIENT)
Dept: PHYSICAL THERAPY | Age: 49
End: 2019-11-01
Payer: COMMERCIAL

## 2019-11-01 DIAGNOSIS — M17.11 PRIMARY OSTEOARTHRITIS OF RIGHT KNEE: ICD-10-CM

## 2019-11-01 DIAGNOSIS — M17.12 OSTEOARTHRITIS OF LEFT KNEE, UNSPECIFIED OSTEOARTHRITIS TYPE: Primary | ICD-10-CM

## 2019-11-01 PROCEDURE — 97113 AQUATIC THERAPY/EXERCISES: CPT

## 2019-11-01 NOTE — PROGRESS NOTES
Daily Note     Today's date: 2019  Patient name: Pilar Narayan  : 1970  MRN: [id number]  Referring provider: Nga Lance MD  Dx:   Encounter Diagnosis     ICD-10-CM    1  Osteoarthritis of left knee, unspecified osteoarthritis type M17 12    2  Primary osteoarthritis of right knee M17 11                   Subjective: Pt noted 5/10 B knee pain   Objective: See treatment diary below    Assessment: Decreased B knee pain after aqua based exercises  Progress as able       Plan: Continue per plan of care  Progress treatment as tolerated  Precautions: OA B knees    Exercise Diary              Heel slides x30            Hip abd x30 ea            SL fall out x30 ea             Knee flex in sitting YTB x20                         Pool   9/26 9/30 10/2 10/7 10/9 1023 10/30 11/1      Aqua blue #'s    5x lap ROBBINS/CD  Comp  comp  comp  Comp   5/5 5/5 5/5 5/ 5x    Hip flex/ext/abd  x30 ea x30 ea  x30 ea x30 ea 30x 30x 30x 30x 30x    HS/piriformis stretch  3x30s ea 3x30s ea 5x30" 5x30" 30sec x 5 30sec x 5 30sec x 5 30sec x5 3   Pool pony cycle  5 min 5' 5' 5' 5 min 5 min 5 min 5 min blue cuff 5 min    LAQ   x30 ea x30 ea x30 ea 30x 30x 30x 30x blue cuff 30x                 HS Curl         30x 30x    Mini Squat         30x 30x    Side stepping         5x 5x    Marching         30x 30x                                                            Modalities                                                            Manual

## 2019-11-04 ENCOUNTER — OFFICE VISIT (OUTPATIENT)
Dept: PHYSICAL THERAPY | Age: 49
End: 2019-11-04
Payer: COMMERCIAL

## 2019-11-04 DIAGNOSIS — M17.11 PRIMARY OSTEOARTHRITIS OF RIGHT KNEE: ICD-10-CM

## 2019-11-04 DIAGNOSIS — M17.12 OSTEOARTHRITIS OF LEFT KNEE, UNSPECIFIED OSTEOARTHRITIS TYPE: Primary | ICD-10-CM

## 2019-11-04 PROCEDURE — 97113 AQUATIC THERAPY/EXERCISES: CPT | Performed by: SPECIALIST/TECHNOLOGIST

## 2019-11-04 NOTE — PROGRESS NOTES
Daily Note     Today's date: 2019  Patient name: Ronald Villegas  : 1970  MRN: [id number]  Referring provider: Shaquille Mendez MD  Dx:   Encounter Diagnosis     ICD-10-CM    1  Osteoarthritis of left knee, unspecified osteoarthritis type M17 12    2  Primary osteoarthritis of right knee M17 11                   Subjective: Pt reports mild soreness following previous treatment session that lasted ~24-48 hours  Objective: See treatment diary below    Assessment: Pt demonstrates increased dynamic exercise tolerance in pool vs land, able to perform progressions each visit without significant discomfort  Tolerated treatment well  Patient demonstrated fatigue post treatment, exhibited good technique with therapeutic exercises and would benefit from continued PT  Plan: Continue per plan of care  Progress treatment as tolerated         Precautions: OA B knees    Exercise Diary          Heel slides         Hip abd         SL fall out         Knee flex in sitting                  Pool 10/30 11/1      Aqua blue #'s  - aqua blue wts      5x lap ROBBINS/CD 5/5 5x  5x      Hip flex/ext/abd 30x 30x  30x      HS/piriformis stretch 30sec x5 3 5x30"      Pool pony cycle 5 min blue cuff 5 min  5 min      LAQ 30x blue cuff 30x  30x               HS Curl 30x 30x  30x      Mini Squat 30x 30x  30x      Side stepping 5x 5x  5x      Marching 30x 30x  30x               Step Ups FW/LAT   20x                            Modalities                                                            Manual

## 2019-11-06 ENCOUNTER — OFFICE VISIT (OUTPATIENT)
Dept: PHYSICAL THERAPY | Age: 49
End: 2019-11-06
Payer: COMMERCIAL

## 2019-11-06 DIAGNOSIS — M17.12 OSTEOARTHRITIS OF LEFT KNEE, UNSPECIFIED OSTEOARTHRITIS TYPE: Primary | ICD-10-CM

## 2019-11-06 PROCEDURE — 97113 AQUATIC THERAPY/EXERCISES: CPT

## 2019-11-06 NOTE — PROGRESS NOTES
Daily Note     Today's date: 2019  Patient name: Zachariah Cole  : 1970  MRN: [id number]  Referring provider: Norman Ramirez MD  Dx:   Encounter Diagnosis     ICD-10-CM    1  Osteoarthritis of left knee, unspecified osteoarthritis type M17 12                   Subjective: Pt reports she has very little pain  Objective: See treatment diary below      Assessment: Tolerated treatment well  Patient would benefit from continued PT      Plan: Continue per plan of care        Precautions: OA B knees    Exercise Diary          Heel slides         Hip abd         SL fall out         Knee flex in sitting                  Pool 10/30 11/1      Aqua blue #'s  - aqua blue wts      5x lap ROBBINS/CD 5/5 5x  5x 5/5     Hip flex/ext/abd 30x 30x  30x 30x     HS/piriformis stretch 30sec x5 3 5x30" 30sec x 5     Pool pony cycle 5 min blue cuff 5 min  5 min 5 min     LAQ 30x blue cuff 30x  30x 30x              HS Curl 30x 30x  30x 30x     Mini Squat 30x 30x  30x 30x     Side stepping 5x 5x  5x 4x     Marching 30x 30x  30x 30x              Step Ups FW/LAT   20x 20x                           Modalities                                                            Manual

## 2019-11-11 ENCOUNTER — OFFICE VISIT (OUTPATIENT)
Dept: PHYSICAL THERAPY | Age: 49
End: 2019-11-11
Payer: COMMERCIAL

## 2019-11-11 DIAGNOSIS — M17.11 PRIMARY OSTEOARTHRITIS OF RIGHT KNEE: ICD-10-CM

## 2019-11-11 DIAGNOSIS — M17.12 OSTEOARTHRITIS OF LEFT KNEE, UNSPECIFIED OSTEOARTHRITIS TYPE: Primary | ICD-10-CM

## 2019-11-11 PROCEDURE — 97113 AQUATIC THERAPY/EXERCISES: CPT | Performed by: SPECIALIST/TECHNOLOGIST

## 2019-11-11 NOTE — PROGRESS NOTES
Daily Note     Today's date: 2019  Patient name: Jose Juan Rodríguez  : 1970  MRN: [id number]  Referring provider: Zonia Mckeon MD  Dx:   Encounter Diagnosis     ICD-10-CM    1  Osteoarthritis of left knee, unspecified osteoarthritis type M17 12    2  Primary osteoarthritis of right knee M17 11                   Subjective: Pt reports substantial improvement in L knee pain from aquatic PT interventions  Objective: See treatment diary below    Assessment: Improved dynamic mobility noted and increased CKC therex with decreased joint pain  Tolerated treatment well  Patient demonstrated fatigue post treatment, exhibited good technique with therapeutic exercises and would benefit from continued PT    Plan: Continue per plan of care  Potential discharge next visit  Pt scheduled to follow up with ortho on , progress note by PT Will Carmen Villegas next visit        Precautions: OA B knees    Exercise Diary          Heel slides         Hip abd         SL fall out         Knee flex in sitting                  Pool 10/30 11/1      Aqua blue #'s  - aqua blue wts - royal blue    5x lap ROBBINS/CD 5/5 5x  5x 5/5 5fw, 5bw    Hip flex/ext/abd 30x 30x  30x 30x 30x    HS/piriformis stretch 30sec x5 3 5x30" 30sec x 5 30sec x5    Pool pony cycle 5 min blue cuff 5 min  5 min 5 min 5 min    LAQ 30x blue cuff 30x  30x 30x 30x             HS Curl 30x 30x  30x 30x 30x    Mini Squat 30x 30x  30x 30x 30x    Side stepping 5x 5x  5x 4x 6x    Marching 30x 30x  30x 30x 30x              Step Ups FW/LAT   20x 20x 20x, added step downs                          Modalities                                                            Manual

## 2019-11-13 ENCOUNTER — EVALUATION (OUTPATIENT)
Dept: PHYSICAL THERAPY | Age: 49
End: 2019-11-13
Payer: COMMERCIAL

## 2019-11-13 DIAGNOSIS — M17.12 OSTEOARTHRITIS OF LEFT KNEE, UNSPECIFIED OSTEOARTHRITIS TYPE: Primary | ICD-10-CM

## 2019-11-13 PROCEDURE — 97164 PT RE-EVAL EST PLAN CARE: CPT | Performed by: PHYSICAL THERAPIST

## 2019-11-13 NOTE — PROGRESS NOTES
PT Discharge    Today's date: 2019  Patient name: Denise Lin  : 1970  MRN: [id number]  Referring provider: Hai Tucker MD  Dx:   Encounter Diagnosis     ICD-10-CM    1  Osteoarthritis of left knee, unspecified osteoarthritis type M17 12                   Assessment  Assessment details: Since presenting to PT with pain of L knee, pt has made improvements in LE strength and knee ROM  While pt continues to have pain with ADLs, pt reports less difficulty  At this time pt will be discharged with HEP with follow up with ortho scheduled for tomorrow      Impairments: abnormal gait, abnormal or restricted ROM, activity intolerance, impaired balance and impaired physical strength    Goals  In 4 weeks pt will:  -Be independent with phase I of HEP-met  -improve L knee strength by 1/2 grade-met  -improve L knee PROM by 10 degrees-partially met    By discharge pt will:  -Be independent with Phase II of HEP-met  -improve L knee strength by 1 grade-met  -improve L knee PROM by 20 degrees-met        Subjective    Objective     Active Range of Motion     Right Knee   Flexion: 119 degrees   Extension: 0 degrees     Strength/Myotome Testing     Left Hip   Planes of Motion   Flexion: 5  Abduction: 5    Left Knee   Extension: 5             Precautions: N/A      Manual                                                                                   Exercise Diary                                                                                                                                                                                                                                                                                      Modalities

## 2019-11-14 ENCOUNTER — OFFICE VISIT (OUTPATIENT)
Dept: OBGYN CLINIC | Facility: HOSPITAL | Age: 49
End: 2019-11-14
Payer: COMMERCIAL

## 2019-11-14 VITALS
BODY MASS INDEX: 30.36 KG/M2 | WEIGHT: 165 LBS | SYSTOLIC BLOOD PRESSURE: 149 MMHG | DIASTOLIC BLOOD PRESSURE: 84 MMHG | HEIGHT: 62 IN | HEART RATE: 89 BPM

## 2019-11-14 DIAGNOSIS — M17.12 PRIMARY OSTEOARTHRITIS OF LEFT KNEE: Primary | ICD-10-CM

## 2019-11-14 PROCEDURE — 99212 OFFICE O/P EST SF 10 MIN: CPT | Performed by: ORTHOPAEDIC SURGERY

## 2019-11-14 NOTE — PROGRESS NOTES
47 y o female presents to the office for follow up of left knee osteoarthritis and medial meniscus tear  She has been attending physical therapy and wearing however this has been applied inappropriately since her last visit  She does note fluctuation in severity of pain  She does experience cracking and popping  She note improvement in the feelings of instability at this time  Her pain does wake her up at night occasionally  Translation is provided by BOWEN Zaragoza  Review of Systems  Review of systems negative unless otherwise specified in HPI    Past Medical History  Past Medical History:   Diagnosis Date    Arthritis     Bacterial vaginosis     Migraine     Obesity     Varicella        Past Surgical History  History reviewed  No pertinent surgical history  Current Medications  Current Outpatient Medications on File Prior to Visit   Medication Sig Dispense Refill    Biotin 10 MG CAPS Take 1 capsule by mouth daily      diclofenac sodium (VOLTAREN) 1 % Apply 4 g topically 4 (four) times a day as needed (pain) for up to 30 days (Patient not taking: Reported on 9/4/2019) 300 g 6    ergocalciferol (VITAMIN D2) 50,000 units Take 1 capsule (50,000 Units total) by mouth once a week 12 capsule 1    lidocaine-prilocaine (EMLA) cream Apply topically as needed for mild pain 30 g 0    naproxen (NAPROSYN) 500 mg tablet Take 1 tablet (500 mg total) by mouth 2 (two) times a day with meals for 120 days (Patient not taking: Reported on 10/10/2019) 60 tablet 3    Omega-3 1000 MG CAPS Take 1 capsule by mouth daily      oxyCODONE-acetaminophen (PERCOCET) 5-325 mg per tablet Take 1 tablet by mouth every 6 (six) hours as needed for moderate pain or severe painMax Daily Amount: 4 tablets (Patient not taking: Reported on 10/10/2019) 30 tablet 0    Topiramate ER (TROKENDI XR) 25 MG CP24 1 capsule daily       No current facility-administered medications on file prior to visit          Recent Labs (HCT,HGB,PT,INR,ESR,CRP,GLU,HgA1C)  0   Lab Value Date/Time    HCT 45 9 07/11/2019 1134    HGB 14 2 07/11/2019 1134    WBC 7 89 07/11/2019 1134    ESR 13 08/17/2019 1043    CRP 8 7 (H) 07/11/2019 1134         Physical exam  · General: Awake, Alert, Oriented  · Eyes: Pupils equal, round and reactive to light  · Heart: regular rate and intact distal pulses  · Lungs: No audible wheezing  · Abdomen: soft  Left knee  · Patient ambulates without assistance   · Skin intact  · Extension 0°, flexion full  · Tender palpation over medial joint line  · No knee effusion  · Knee stable in sagittal and coronal planes  · 5 out of 5 quad and hamstring strength  · Sensation intact L3-S1  · Brisk capillary refill to the foot and toes       Assessment/Plan:   49 y  o female with left knee osteoarthritis and medial meniscus tear will continue physical therapy and use of brace  Medial  brace was again fitted today in the office  At this time most of her symptoms continue to be related to her left knee osteoarthritis and may not be improved with left knee arthroscopy  She will try the medial  brace for 4 weeks  If no improvement we will consider left knee arthroscopy  Follow up in 4 weeks

## 2019-11-14 NOTE — LETTER
November 14, 2019     Patient: Ancelmo Goodrich   YOB: 1970   Date of Visit: 11/14/2019       To Whom it May Concern:    Ancelmo Goodrich is under my professional care  She was seen in my office on 11/14/2019  She may return to work on 11/15/19  She should refrain from activities that involve lifting, pivoting on her left knee, standing for long periods of time  If accommodations can be made to allow her to perform desk duties, this would serve her well  If you have any questions or concerns, please don't hesitate to call           Sincerely,          Kathleen Cavazos MD        CC: No Recipients

## 2019-11-19 ENCOUNTER — APPOINTMENT (OUTPATIENT)
Dept: PHYSICAL THERAPY | Age: 49
End: 2019-11-19
Payer: COMMERCIAL

## 2019-11-19 ENCOUNTER — OFFICE VISIT (OUTPATIENT)
Dept: PHYSICAL THERAPY | Age: 49
End: 2019-11-19
Payer: COMMERCIAL

## 2019-11-19 ENCOUNTER — TELEPHONE (OUTPATIENT)
Dept: OBGYN CLINIC | Facility: HOSPITAL | Age: 49
End: 2019-11-19

## 2019-11-19 DIAGNOSIS — M17.11 PRIMARY OSTEOARTHRITIS OF RIGHT KNEE: ICD-10-CM

## 2019-11-19 DIAGNOSIS — M17.12 OSTEOARTHRITIS OF LEFT KNEE, UNSPECIFIED OSTEOARTHRITIS TYPE: Primary | ICD-10-CM

## 2019-11-19 PROCEDURE — 97113 AQUATIC THERAPY/EXERCISES: CPT

## 2019-11-19 NOTE — PROGRESS NOTES
Daily Note     Today's date: 2019  Patient name: Jake Aguillon  : 1970  MRN: [id number]  Referring provider: Sydnee Monroe MD  Dx:   Encounter Diagnosis     ICD-10-CM    1  Osteoarthritis of left knee, unspecified osteoarthritis type M17 12    2  Primary osteoarthritis of right knee M17 11                   Subjective: Patient was seen by referring ortho on  who has recommended 4 additional weeks of PT  F/U with referring physician on   She presents with moderate, 7/10 L knee pain  Patient has been adherent with donning of offloading brace  Offers no additional complaints  Objective: See treatment diary below      Assessment: Patient tolerated today's session very well exhibited by ability to perform LE PRE's without reproduction of sx's  Minimal cuing required for proper performance of exercises  Resistance was appropriate given patient exhibited fatigue, but was still able to perform all prescribed repetitions  Patient demonstrated fatigue post treatment, exhibited good technique with therapeutic exercises and would benefit from continued PT      Plan: Continue per plan of care        Precautions: N/A      Exercise Diary              Pool 10/30 11/1      Aqua blue #'s  - aqua blue wts - royal blue   PN   Royal Blue      5x lap ROBBINS/CD 5/5 5x  5x 5/5 5fw, 5bw  5x      Hip flex/ext/abd 30x 30x  30x 30x 30x        HS/piriformis stretch 30sec x5 3 5x30" 30sec x 5 30sec x5  5x30" ea       Pool pony cycle 5 min blue cuff 5 min  5 min 5 min 5 min        LAQ 30x blue cuff 30x  30x 30x 30x  30x                   HS Curl 30x 30x  30x 30x 30x  30x        Mini Squat 30x 30x  30x 30x 30x  30x      Side stepping 5x 5x  5x 4x 6x  6x      Marching 30x 30x  30x 30x 30x   30x                   Step Ups FW/LAT   20x 20x 20x, added step downs  20x ea

## 2019-11-19 NOTE — TELEPHONE ENCOUNTER
Bertha Patel work note to allow her to go up one flight of stairs once a day to office then down at the end of the day so she can work  She feels she can do this  Please call for office pickup when ready

## 2019-11-22 ENCOUNTER — OFFICE VISIT (OUTPATIENT)
Dept: PHYSICAL THERAPY | Age: 49
End: 2019-11-22
Payer: COMMERCIAL

## 2019-11-22 DIAGNOSIS — M17.12 OSTEOARTHRITIS OF LEFT KNEE, UNSPECIFIED OSTEOARTHRITIS TYPE: Primary | ICD-10-CM

## 2019-11-22 PROCEDURE — 97110 THERAPEUTIC EXERCISES: CPT | Performed by: PHYSICAL THERAPIST

## 2019-11-22 PROCEDURE — 97140 MANUAL THERAPY 1/> REGIONS: CPT | Performed by: PHYSICAL THERAPIST

## 2019-11-22 NOTE — PROGRESS NOTES
Daily Note     Today's date: 2019  Patient name: Denise Lin  : 1970  MRN: [id number]  Referring provider: Hai Tucker MD  Dx:   Encounter Diagnosis     ICD-10-CM    1  Osteoarthritis of left knee, unspecified osteoarthritis type M17 12                   Subjective: Pt reports being frustrated with continued pain, but states she has pain knee flexion       Objective: See treatment diary below      Assessment: Tolerated treatment poor  Patient significant time was spent discussing plan of care/exercises with pt  Pt struggles to perform land based exercises due to pain with knee flexion  Plan: Continue per plan of care        Precautions: N/A      Exercise Diary              Pool 10/30 11/1      Aqua blue #'s  - aqua blue wts - royal blue   PN   Royal Blue      5x lap ROBBINS/CD  5x  5x 5/5 5fw, 5bw  5x      Hip flex/ext/abd 30x 30x  30x 30x 30x        HS/piriformis stretch 30sec x5 3 5x30" 30sec x 5 30sec x5  5x30" ea       Pool pony cycle 5 min blue cuff 5 min  5 min 5 min 5 min        LAQ 30x blue cuff 30x  30x 30x 30x  30x                   HS Curl 30x 30x  30x 30x 30x  30x        Mini Squat 30x 30x  30x 30x 30x  30x      Side stepping 5x 5x  5x 4x 6x  6x      Marching 30x 30x  30x 30x 30x   30x                   Step Ups FW/LAT   20x 20x 20x, added step downs  20x ea                   HS stretch        5x30s     Std hip abd        x30 ea     HR        x30     LAQ         L 5" x30                  Manual             Stm L knee/patela mobernst ROBBINS

## 2019-11-25 ENCOUNTER — OFFICE VISIT (OUTPATIENT)
Dept: PHYSICAL THERAPY | Age: 49
End: 2019-11-25
Payer: COMMERCIAL

## 2019-11-25 DIAGNOSIS — M17.12 OSTEOARTHRITIS OF LEFT KNEE, UNSPECIFIED OSTEOARTHRITIS TYPE: Primary | ICD-10-CM

## 2019-11-25 PROCEDURE — 97113 AQUATIC THERAPY/EXERCISES: CPT

## 2019-11-25 NOTE — PROGRESS NOTES
Daily Note     Today's date: 2019  Patient name: Stefan Salcedo  : 1970  MRN: [id number]  Referring provider: Bart Tobin MD  Dx:   Encounter Diagnosis     ICD-10-CM    1  Osteoarthritis of left knee, unspecified osteoarthritis type M17 12                   Subjective: Without further complaints  Objective: See treatment diary below      Assessment: Tolerated treatment well  Patient would benefit from continued PT      Plan: Continue per plan of care        Precautions: N/A      Exercise Diary              Pool 10/30 11/1      Aqua blue #'s  - aqua blue wts - royal blue   PN   Royal Blue   RB #'s     5x lap ROBBINS/CD 5/5 5x  5x 5/5 5fw, 5bw  5x 5/5     Hip flex/ext/abd 30x 30x  30x 30x 30x   30x     HS/piriformis stretch 30sec x5 3 5x30" 30sec x 5 30sec x5  5x30" ea  30sec x 5     Pool pony cycle 5 min blue cuff 5 min  5 min 5 min 5 min        LAQ 30x blue cuff 30x  30x 30x 30x  30x 30x     HR        30x     HS Curl 30x 30x  30x 30x 30x  30x   30x     Mini Squat 30x 30x  30x 30x 30x  30x 30x     Side stepping 5x 5x  5x 4x 6x  6x 6x     Marching 30x 30x  30x 30x 30x   30x 30x                  Step Ups FW/LAT   20x 20x 20x, added step downs  20x ea 20x                                                                                                    Manual             Stm L knee/patela leisa ROBBINS

## 2019-11-27 ENCOUNTER — OFFICE VISIT (OUTPATIENT)
Dept: PHYSICAL THERAPY | Age: 49
End: 2019-11-27
Payer: COMMERCIAL

## 2019-11-27 DIAGNOSIS — M17.11 PRIMARY OSTEOARTHRITIS OF RIGHT KNEE: ICD-10-CM

## 2019-11-27 DIAGNOSIS — M17.12 OSTEOARTHRITIS OF LEFT KNEE, UNSPECIFIED OSTEOARTHRITIS TYPE: Primary | ICD-10-CM

## 2019-11-27 PROCEDURE — 97113 AQUATIC THERAPY/EXERCISES: CPT

## 2019-11-27 NOTE — PROGRESS NOTES
Daily Note     Today's date: 2019  Patient name: Stefan Salcedo  : 1970  MRN: [id number]  Referring provider: Bart Tobin MD  Dx:   Encounter Diagnosis     ICD-10-CM    1  Osteoarthritis of left knee, unspecified osteoarthritis type M17 12    2  Primary osteoarthritis of right knee M17 11                   Subjective: Pt reported L lat knee pain with flexion only  Objective: See treatment diary below      Assessment: Reduced pain at L lat knee after aqua based exercises, modified session without ankle weights     Plan: Continue per plan of care        Precautions: N/A      Exercise Diary              Pool 10/30 11/1      Aqua blue #'s  - aqua blue wts - royal blue   PN   Royal Blue   RB #'s No #'s     5x lap ROBBINS/CD 5/5 5x  5x 5/5 5fw, 5bw  5x 5/5 5/5    Hip flex/ext/abd 30x 30x  30x 30x 30x   30x 30x     HS/piriformis stretch 30sec x5 3 5x30" 30sec x 5 30sec x5  5x30" ea  30sec x 5 20sec  5x     Pool pony cycle 5 min blue cuff 5 min  5 min 5 min 5 min    5 min  seated at bench     LAQ 30x blue cuff 30x  30x 30x 30x  30x 30x 30x     HR        30x 30x     HS Curl 30x 30x  30x 30x 30x  30x   30x 30x     Mini Squat 30x 30x  30x 30x 30x  30x 30x 30x     Side stepping 5x 5x  5x 4x 6x  6x 6x     Marching 30x 30x  30x 30x 30x   30x 30x 30x     SLRx3          30x     Step Ups FW/LAT   20x 20  x 20x, added step downs  20x ea 20x NT                                                                                                    Manual             Stm L knee/patela leisa ROBBINS

## 2019-12-02 ENCOUNTER — OFFICE VISIT (OUTPATIENT)
Dept: PHYSICAL THERAPY | Age: 49
End: 2019-12-02
Payer: COMMERCIAL

## 2019-12-02 DIAGNOSIS — M17.12 OSTEOARTHRITIS OF LEFT KNEE, UNSPECIFIED OSTEOARTHRITIS TYPE: Primary | ICD-10-CM

## 2019-12-02 DIAGNOSIS — M17.11 PRIMARY OSTEOARTHRITIS OF RIGHT KNEE: ICD-10-CM

## 2019-12-02 PROCEDURE — 97113 AQUATIC THERAPY/EXERCISES: CPT

## 2019-12-02 NOTE — PROGRESS NOTES
Daily Note     Today's date: 2019  Patient name: Lora Appiah  : 1970  MRN: [id number]  Referring provider: Yusef Fermin MD  Dx:   Encounter Diagnosis     ICD-10-CM    1  Osteoarthritis of left knee, unspecified osteoarthritis type M17 12    2  Primary osteoarthritis of right knee M17 11                   Subjective: Patient presents feeling fairly well overall  Reports 2-3/10 knee pain B/L  Offers no additional complaints  Objective: See treatment diary below      Assessment: Patient tolerated tx session fairly well exhibited by ability to perform LE strengthening exercises without significant reproduction of sx's  Demonstrated understanding of how to perform exercises properly without significant cuing  Patient demonstrated fatigue post treatment, exhibited good technique with therapeutic exercises and would benefit from continued PT      Plan: Continue per plan of care        Precautions: N/A      Exercise Diary                  Pool 10/30 11/1      Aqua blue #'s  - aqua blue wts - royal blue   PN   Royal Blue   RB #'s No #'s         5x lap ROBBINS/CD 5/5 5x  5x 5/5 5fw, 5bw  5x 5/5 5/5 5       Hip flex/ext/abd 30x 30x  30x 30x 30x   30x 30x  30x       HS/piriformis stretch 30sec x5 3 5x30" 30sec x 5 30sec x5  5x30" ea  30sec x 5 20sec  5x  20sec 5x       Pool pony cycle 5 min blue cuff 5 min  5 min 5 min 5 min    5 min  seated at bench  5 min       LAQ 30x blue cuff 30x  30x 30x 30x  30x 30x 30x  30x       HR        30x 30x  30x       HS Curl 30x 30x  30x 30x 30x  30x   30x 30x  30x       Mini Squat 30x 30x  30x 30x 30x  30x 30x 30x  30x       Side stepping 5x 5x  5x 4x 6x  6x 6x         Marching 30x 30x  30x 30x 30x   30x 30x 30x  30x       SLRx3          30x  30x       Step Ups FW/LAT   20x 20  x 20x, added step downs  20x ea 20x NT Manual                 Stm L knee/patela leisa        ROBBINS

## 2019-12-04 ENCOUNTER — OFFICE VISIT (OUTPATIENT)
Dept: PHYSICAL THERAPY | Age: 49
End: 2019-12-04
Payer: COMMERCIAL

## 2019-12-04 DIAGNOSIS — M17.12 OSTEOARTHRITIS OF LEFT KNEE, UNSPECIFIED OSTEOARTHRITIS TYPE: Primary | ICD-10-CM

## 2019-12-04 PROCEDURE — 97113 AQUATIC THERAPY/EXERCISES: CPT

## 2019-12-04 NOTE — PROGRESS NOTES
Daily Note     Today's date: 2019  Patient name: Lora Appiah  : 1970  MRN: [id number]  Referring provider: Yusef Fermin MD  Dx:   Encounter Diagnosis     ICD-10-CM    1  Osteoarthritis of left knee, unspecified osteoarthritis type M17 12                   Subjective: Tight quads  Objective: See treatment diary below      Assessment: Tolerated treatment well  Patient would benefit from continued PT      Plan: Continue per plan of care        Precautions: N/A      Exercise Diary                  Pool 10/30 11/1      Aqua blue #'s  - aqua blue wts - royal blue   PN   Royal Blue   RB #'s No #'s        5x lap ROBBINS/CD 5/5 5x  5x 5/5 5fw, 5bw  5x 5/5 5/5 5 5/5      Hip flex/ext/abd 30x 30x  30x 30x 30x   30x 30x  30x 30x      HS/piriformis stretch 30sec x5 3 5x30" 30sec x 5 30sec x5  5x30" ea  30sec x 5 20sec  5x  20sec 5x 20sec x 5      Pool pony cycle 5 min blue cuff 5 min  5 min 5 min 5 min    5 min  seated at bench  5 min       LAQ 30x blue cuff 30x  30x 30x 30x  30x 30x 30x  30x 30x      HR        30x 30x  30x 30x      HS Curl 30x 30x  30x 30x 30x  30x   30x 30x  30x 30x      Mini Squat 30x 30x  30x 30x 30x  30x 30x 30x  30x       Side stepping 5x 5x  5x 4x 6x  6x 6x         Marching 30x 30x  30x 30x 30x   30x 30x 30x  30x       SLRx3          30x  30x 30x      Step Ups FW/LAT   20x 20  x 20x, added step downs  20x ea 20x NT                                                                                                                                Manual                 Stm L knee/patela leisa ROBBINS

## 2019-12-09 ENCOUNTER — OFFICE VISIT (OUTPATIENT)
Dept: PHYSICAL THERAPY | Age: 49
End: 2019-12-09
Payer: COMMERCIAL

## 2019-12-09 DIAGNOSIS — M17.11 PRIMARY OSTEOARTHRITIS OF RIGHT KNEE: ICD-10-CM

## 2019-12-09 DIAGNOSIS — M17.12 OSTEOARTHRITIS OF LEFT KNEE, UNSPECIFIED OSTEOARTHRITIS TYPE: Primary | ICD-10-CM

## 2019-12-09 PROCEDURE — 97113 AQUATIC THERAPY/EXERCISES: CPT | Performed by: SPECIALIST/TECHNOLOGIST

## 2019-12-09 NOTE — PROGRESS NOTES
Daily Note     Today's date: 2019  Patient name: Lora Appiah  : 1970  MRN: [id number]  Referring provider: Yusef Fermin MD  Dx:   Encounter Diagnosis     ICD-10-CM    1  Osteoarthritis of left knee, unspecified osteoarthritis type M17 12    2  Primary osteoarthritis of right knee M17 11                   Subjective: Pt reports increased R knee pain this visit which she attributes to the weather  Objective: See treatment diary below    Assessment: Pt demonstrates improved muscular control with CKC therex which can be contributed to improved strength  Tolerated treatment well  Patient demonstrated fatigue post treatment, exhibited good technique with therapeutic exercises and would benefit from continued PT in pool  Plan: Continue per plan of care  Progress treatment as tolerated         Precautions: N/A      Exercise Diary                  Pool 10/30 11/1      Aqua blue #'s  - aqua blue wts - royal blue   PN   Royal Blue   RB #'s No #'s  - blue      5x lap ROBBINS/CD 5/5 5x  5x 5/5 5fw, 5bw  5x 5/5 5/5 5 5/5 5/5     Hip flex/ext/abd 30x 30x  30x 30x 30x   30x 30x  30x 30x 30x     HS/piriformis stretch 30sec x5 3 5x30" 30sec x 5 30sec x5  5x30" ea  30sec x 5 20sec  5x  20sec 5x 20sec x 5 20sec x5     Pool pony cycle 5 min blue cuff 5 min  5 min 5 min 5 min    5 min  seated at bench  5 min  5 min     LAQ 30x blue cuff 30x  30x 30x 30x  30x 30x 30x  30x 30x 30x     HR        30x 30x  30x 30x 30x     HS Curl 30x 30x  30x 30x 30x  30x   30x 30x  30x 30x 30x     Mini Squat 30x 30x  30x 30x 30x  30x 30x 30x  30x  30x     Side stepping 5x 5x  5x 4x 6x  6x 6x    6x     Marching 30x 30x  30x 30x 30x   30x 30x 30x  30x  30x     SLRx3          30x  30x 30x 30x     Step Ups FW/LAT   20x 20  x 20x, added step downs  20x ea 20x NT    20x 3 way                      BW walking            5x Manual                 Stm L knee/patela leisa        ROBBINS

## 2019-12-11 ENCOUNTER — OFFICE VISIT (OUTPATIENT)
Dept: PHYSICAL THERAPY | Age: 49
End: 2019-12-11
Payer: COMMERCIAL

## 2019-12-11 DIAGNOSIS — M17.12 OSTEOARTHRITIS OF LEFT KNEE, UNSPECIFIED OSTEOARTHRITIS TYPE: ICD-10-CM

## 2019-12-11 DIAGNOSIS — M17.11 PRIMARY OSTEOARTHRITIS OF RIGHT KNEE: Primary | ICD-10-CM

## 2019-12-11 PROCEDURE — 97113 AQUATIC THERAPY/EXERCISES: CPT

## 2019-12-11 NOTE — PROGRESS NOTES
Daily Note     Today's date: 2019  Patient name: Diane Levi  : 1970  MRN: [id number]  Referring provider: Johnny Tam MD  Dx:   Encounter Diagnosis     ICD-10-CM    1  Primary osteoarthritis of right knee M17 11    2  Osteoarthritis of left knee, unspecified osteoarthritis type M17 12                   Subjective: Pain with prolonged standing and end range flexion at R knee "I have improved about 50% since initiating aqua therapy  I can now stand up from sitting with less pain"     Objective: See treatment diary below    Assessment: Tolerates aqua based exercises well without increased pain  Plan: Continue per plan of care  Progress treatment as tolerated         Precautions: N/A      Exercise Diary              Pool 10/30 11/1      Aqua blue #'s  - aqua blue wts - royal blue   PN   Royal Blue   RB #'s No #'s  - blue  0# ankle weights     5x lap ROBBINS/CD 5/5 5x  5x 5/5 5fw, 5bw  5x 5/5 5/5 5 5/5 5/5 5/5 laps     Hip flex/ext/abd 30x 30x  30x 30x 30x   30x 30x  30x 30x 30x 30x     HS/piriformis stretch 30sec x5 3 5x30" 30sec x 5 30sec x5  5x30" ea  30sec x 5 20sec  5x  20sec 5x 20sec x 5 20sec x5     Pool pony cycle 5 min blue cuff 5 min  5 min 5 min 5 min    5 min  seated at bench  5 min  5 min 5 min     LAQ 30x blue cuff 30x  30x 30x 30x  30x 30x 30x  30x 30x 30x 30x     HR        30x 30x  30x 30x 30x 30x     HS Curl 30x 30x  30x 30x 30x  30x   30x 30x  30x 30x 30x 30x     Mini Squat 30x 30x  30x 30x 30x  30x 30x 30x  30x  30x 30x     Side stepping 5x 5x  5x 4x 6x  6x 6x    6x 6x     Marching 30x 30x  30x 30x 30x   30x 30x 30x  30x  30x 30x     SLRx3          30x  30x 30x 30x 30x     Step Ups FW/LAT   20x 20  x 20x, added step downs  20x ea 20x NT    20x 3 way 20x                      BW walking            5x  5x                                                                                          Manual                 Stm L knee/patela leisa ROBBINS

## 2019-12-12 ENCOUNTER — OFFICE VISIT (OUTPATIENT)
Dept: OBGYN CLINIC | Facility: HOSPITAL | Age: 49
End: 2019-12-12
Payer: COMMERCIAL

## 2019-12-12 VITALS
HEART RATE: 77 BPM | DIASTOLIC BLOOD PRESSURE: 84 MMHG | HEIGHT: 62 IN | WEIGHT: 165 LBS | BODY MASS INDEX: 30.36 KG/M2 | SYSTOLIC BLOOD PRESSURE: 148 MMHG

## 2019-12-12 DIAGNOSIS — M17.12 PRIMARY OSTEOARTHRITIS OF LEFT KNEE: Primary | ICD-10-CM

## 2019-12-12 DIAGNOSIS — S83.232D COMPLEX TEAR OF MEDIAL MENISCUS OF LEFT KNEE AS CURRENT INJURY, SUBSEQUENT ENCOUNTER: ICD-10-CM

## 2019-12-12 DIAGNOSIS — Z01.812 PRE-OPERATIVE LABORATORY EXAMINATION: ICD-10-CM

## 2019-12-12 PROCEDURE — 99213 OFFICE O/P EST LOW 20 MIN: CPT | Performed by: ORTHOPAEDIC SURGERY

## 2019-12-12 RX ORDER — CHLORHEXIDINE GLUCONATE 4 G/100ML
SOLUTION TOPICAL DAILY PRN
Status: CANCELLED | OUTPATIENT
Start: 2019-12-12

## 2019-12-12 RX ORDER — CHLORHEXIDINE GLUCONATE 0.12 MG/ML
15 RINSE ORAL ONCE
Status: CANCELLED | OUTPATIENT
Start: 2019-12-12 | End: 2019-12-12

## 2019-12-12 RX ORDER — VANCOMYCIN HYDROCHLORIDE 1 G/200ML
1000 INJECTION, SOLUTION INTRAVENOUS ONCE
Status: CANCELLED | OUTPATIENT
Start: 2019-12-12 | End: 2019-12-12

## 2019-12-12 NOTE — PROGRESS NOTES
Patient Name:  Gurjit Meza  MRN:  [id number]    Assessment & Plan     Left knee osteoarthritis, left medial meniscus tear complex   1  Left knee arthroscopy with partial medial menisectomy was discussed today, with the understanding this should help her locking sensation, but may not help with her pain as that may be stemming from her arthritis  Surgery may also flare up her arthritis as well   2  She will require PT following surgery   3  It was discussed today that she will be out of work following surgery, aprox  1-2 weeks, restrictions will not be known until after surgical intervention  We will continue her current work restrictions until surgery   4  Work note was provided today, with restrictions   5  Follow up after surgery     Subjective     47 y o  female presents to the office today for a follow up regarding left knee pain  Calderon Serra has been wearing a medial  brace  She has also been attending PT  She does note mild improvement with PT  She received a left knee CSI in the past 08/15/19, which she states caused increased pain  She notes popping and locking to her right knee  Calderon Serra states that she does have to manually use her hands to unlock her knee  She notes that the locking keeps her from increased ambulation  She states her pain is to the anterior aspect of her left knee  Khmer interpretation was provided     General ROS:  Negative for fever or chills  Neurological ROS:  Negative for numbness or tingling  Objective     /84   Pulse 77   Ht 5' 2" (1 575 m)   Wt 74 8 kg (165 lb)   BMI 30 18 kg/m²     Left knee:  Ambulates without assistance   No erythema  No ecchymosis   No edema  Full ROM    Sensation intact to light touch   Psychiatric: Mood and affect are appropriate    Data Review     I have personally reviewed pertinent films in PACS, and my interpretation follows      No new imaging to review     Social History     Tobacco Use    Smoking status: Never Smoker    Smokeless tobacco: Never Used   Substance Use Topics    Alcohol use: No    Drug use: No       Scribe Attestation    I,:   Len Rhoades am acting as a scribe while in the presence of the attending physician :        I,:   Thu Roldan MD personally performed the services described in this documentation    as scribed in my presence :

## 2019-12-12 NOTE — LETTER
December 12, 2019     Patient: Sudarshan Whiting   YOB: 1970   Date of Visit: 12/12/2019       To Whom it May Concern:    Sudarshan Whiting is under my professional care  She was seen in my office on 12/12/2019  Continue current work restrictions until aprox  March 1st of 2020  She will be re-evaluated in the office 1014 days following surgical intervention  If you have any questions or concerns, please don't hesitate to call           Sincerely,          Nat Otto MD

## 2019-12-20 ENCOUNTER — OFFICE VISIT (OUTPATIENT)
Dept: PHYSICAL THERAPY | Age: 49
End: 2019-12-20
Payer: COMMERCIAL

## 2019-12-20 DIAGNOSIS — M17.11 PRIMARY OSTEOARTHRITIS OF RIGHT KNEE: Primary | ICD-10-CM

## 2019-12-20 DIAGNOSIS — M17.12 OSTEOARTHRITIS OF LEFT KNEE, UNSPECIFIED OSTEOARTHRITIS TYPE: ICD-10-CM

## 2019-12-20 PROCEDURE — 97110 THERAPEUTIC EXERCISES: CPT

## 2019-12-20 NOTE — PROGRESS NOTES
Daily Note     Today's date: 2019  Patient name: Gurjit Gentile  : 1970  MRN: [id number]  Referring provider: Mike Liu MD  Dx:   Encounter Diagnosis     ICD-10-CM    1  Primary osteoarthritis of right knee M17 11    2  Osteoarthritis of left knee, unspecified osteoarthritis type M17 12                   Subjective: Pain has improved about 50% since initiating aqua therapy  Likes pool therapy as ex is easier and it has helped her LE strength    Objective: See treatment diary below    Assessment: Tolerates aqua based exercises well without increased pain  Pt feels challenged with current ex intensity    Plan: Continue per plan of care  Progress treatment as tolerated         Precautions: N/A      Exercise Diary             Pool 10/30 11/1      Aqua blue #'s  - aqua blue wts - royal blue   PN   Royal Blue   RB #'s No #'s  - blue  0# ankle weights  0#   5x lap ROBBINS/CD 5/5 5x  5x 5/5 5fw, 5bw  5x 5/5 5/5 5 5/5 5/5 5/5 laps  5/5 laps   Hip flex/ext/abd 30x 30x  30x 30x 30x   30x 30x  30x 30x 30x 30x  30   HS/piriformis stretch 30sec x5 3 5x30" 30sec x 5 30sec x5  5x30" ea  30sec x 5 20sec  5x  20sec 5x 20sec x 5 20sec x5  [id number]   Pool pony cycle 5 min blue cuff 5 min  5 min 5 min 5 min    5 min  seated at bench  5 min  5 min 5 min  5min   LAQ 30x blue cuff 30x  30x 30x 30x  30x 30x 30x  30x 30x 30x 30x  30x   HR        30x 30x  30x 30x 30x 30x  30x   HS Curl 30x 30x  30x 30x 30x  30x   30x 30x  30x 30x 30x 30x  30x   Mini Squat 30x 30x  30x 30x 30x  30x 30x 30x  30x  30x 30x  30x   Side stepping 5x 5x  5x 4x 6x  6x 6x    6x 6x  6x   Marching 30x 30x  30x 30x 30x   30x 30x 30x  30x  30x 30x  30x   SLRx3          30x  30x 30x 30x 30x  30x   Step Ups FW/LAT   20x 20  x 20x, added step downs  20x ea 20x NT    20x 3 way 20x  20x                    BW walking            5x  5x  5x Manual                 Stm L knee/patela leisa        ROBBINS

## 2019-12-23 ENCOUNTER — OFFICE VISIT (OUTPATIENT)
Dept: PHYSICAL THERAPY | Age: 49
End: 2019-12-23
Payer: COMMERCIAL

## 2019-12-23 DIAGNOSIS — M17.11 PRIMARY OSTEOARTHRITIS OF RIGHT KNEE: Primary | ICD-10-CM

## 2019-12-23 PROCEDURE — 97113 AQUATIC THERAPY/EXERCISES: CPT

## 2019-12-23 NOTE — PROGRESS NOTES
Daily Note     Today's date: 2019  Patient name: Gurjit Meza  : 1970  MRN: [id number]  Referring provider: Cheryl Brunson MD  Dx:   Encounter Diagnosis     ICD-10-CM    1  Primary osteoarthritis of right knee M17 11                   Subjective: Pt  [id number] increased left knee pain with activity  Objective: See treatment diary below      Assessment: Tolerated treatment well  Patient would benefit from continued PT      Plan: Continue per plan of care        Precautions: N/A      Exercise Diary     Pool       Aqua blue #'s  - aqua blue wts - royal blue   PN   Royal Blue   RB #'s No #'s  - blue  0# ankle weights  0#   5x lap ROBBINS/CD 5/5 5x  5x 5/5 5fw, 5bw  5x 5/5 5/5 5 5/5 5/5 5/5 laps  5/5 laps   Hip flex/ext/abd 30x 30x  30x 30x 30x   30x 30x  30x 30x 30x 30x  30   HS/piriformis stretch 30sec x5 3 5x30" 30sec x 5 30sec x5  5x30" ea  30sec x 5 20sec  5x  20sec 5x 20sec x 5 20sec x5  [id number]   Pool pony cycle 5 min blue cuff 5 min  5 min 5 min 5 min    5 min  seated at bench  5 min  5 min 5 min  5min   LAQ 30x blue cuff 30x  30x 30x 30x  30x 30x 30x  30x 30x 30x 30x  30x   HR        30x 30x  30x 30x 30x 30x  30x   HS Curl 30x 30x  30x 30x 30x  30x   30x 30x  30x 30x 30x 30x  30x   Mini Squat 30x 30x  30x 30x 30x  30x 30x 30x  30x  30x 30x  30x   Side stepping 5x 5x  5x 4x 6x  6x 6x    6x 6x  6x   Marching 30x 30x  30x 30x 30x   30x 30x 30x  30x  30x 30x  30x   SLRx3  30x        30x  30x 30x 30x 30x  30x   Step Ups FW/LAT 20x  20x 20  x 20x, added step downs  20x ea 20x NT    20x 3 way 20x  20x                    BW walking 6x           5x  5x  5x                                                                                        Manual                 Stm L knee/patela mobs        ROBBINS

## 2019-12-27 ENCOUNTER — OFFICE VISIT (OUTPATIENT)
Dept: PHYSICAL THERAPY | Age: 49
End: 2019-12-27
Payer: COMMERCIAL

## 2019-12-27 DIAGNOSIS — M17.12 OSTEOARTHRITIS OF LEFT KNEE, UNSPECIFIED OSTEOARTHRITIS TYPE: ICD-10-CM

## 2019-12-27 DIAGNOSIS — M17.11 PRIMARY OSTEOARTHRITIS OF RIGHT KNEE: Primary | ICD-10-CM

## 2019-12-27 PROCEDURE — 97113 AQUATIC THERAPY/EXERCISES: CPT

## 2019-12-27 NOTE — PROGRESS NOTES
Daily Note     Today's date: 2019  Patient name: Arjun Javier  : 1970  MRN: [id number]  Referring provider: Eder Villela MD  Dx:   Encounter Diagnosis     ICD-10-CM    1  Primary osteoarthritis of right knee M17 11    2  Osteoarthritis of left knee, unspecified osteoarthritis type M17 12                   Subjective: "I am feeling stronger"       Objective: See treatment diary below      Assessment: Improved tolerance to aqua based exercises  Plan: Continue per plan of care        Precautions: N/A       POOL                                   5x lap ROBBINS/CD  5                Hip flex/ext/abd 30x 30x                 HS/piriformis stretch 30sec x5 30sec 5x                Pool pony cycle 5 min blue cuff 5 min  With blue cuffs                LAQ 30x blue cuff 30x  With #'s                HR  30x                HS Curl 30x 30x                Mini Squat 30x 30x                Side stepping 5x 6x                Marching 30x 30x                SLRx3  30x 30x               Step Ups FW/LAT 20x 20x                                  BW walking 6x 6x                                                                                                     Manual                 Stm L knee/patela leisa

## 2019-12-28 ENCOUNTER — APPOINTMENT (OUTPATIENT)
Dept: LAB | Facility: MEDICAL CENTER | Age: 49
End: 2019-12-28
Payer: COMMERCIAL

## 2019-12-28 DIAGNOSIS — S83.232D COMPLEX TEAR OF MEDIAL MENISCUS OF LEFT KNEE AS CURRENT INJURY, SUBSEQUENT ENCOUNTER: ICD-10-CM

## 2019-12-28 DIAGNOSIS — Z01.812 PRE-OPERATIVE LABORATORY EXAMINATION: ICD-10-CM

## 2019-12-28 LAB
APTT PPP: 30 SECONDS (ref 23–37)
BASOPHILS # BLD AUTO: 0.07 THOUSANDS/ΜL (ref 0–0.1)
BASOPHILS NFR BLD AUTO: 1 % (ref 0–1)
EOSINOPHIL # BLD AUTO: 0.08 THOUSAND/ΜL (ref 0–0.61)
EOSINOPHIL NFR BLD AUTO: 1 % (ref 0–6)
ERYTHROCYTE [DISTWIDTH] IN BLOOD BY AUTOMATED COUNT: 13.2 % (ref 11.6–15.1)
HCT VFR BLD AUTO: 49 % (ref 34.8–46.1)
HGB BLD-MCNC: 15.2 G/DL (ref 11.5–15.4)
IMM GRANULOCYTES # BLD AUTO: 0.03 THOUSAND/UL (ref 0–0.2)
IMM GRANULOCYTES NFR BLD AUTO: 0 % (ref 0–2)
INR PPP: 1.04 (ref 0.84–1.19)
LYMPHOCYTES # BLD AUTO: 2.41 THOUSANDS/ΜL (ref 0.6–4.47)
LYMPHOCYTES NFR BLD AUTO: 29 % (ref 14–44)
MCH RBC QN AUTO: 29.1 PG (ref 26.8–34.3)
MCHC RBC AUTO-ENTMCNC: 31 G/DL (ref 31.4–37.4)
MCV RBC AUTO: 94 FL (ref 82–98)
MONOCYTES # BLD AUTO: 0.45 THOUSAND/ΜL (ref 0.17–1.22)
MONOCYTES NFR BLD AUTO: 5 % (ref 4–12)
NEUTROPHILS # BLD AUTO: 5.31 THOUSANDS/ΜL (ref 1.85–7.62)
NEUTS SEG NFR BLD AUTO: 64 % (ref 43–75)
NRBC BLD AUTO-RTO: 0 /100 WBCS
PLATELET # BLD AUTO: 275 THOUSANDS/UL (ref 149–390)
PMV BLD AUTO: 9.8 FL (ref 8.9–12.7)
PROTHROMBIN TIME: 13.2 SECONDS (ref 11.6–14.5)
RBC # BLD AUTO: 5.23 MILLION/UL (ref 3.81–5.12)
WBC # BLD AUTO: 8.35 THOUSAND/UL (ref 4.31–10.16)

## 2019-12-28 PROCEDURE — 36415 COLL VENOUS BLD VENIPUNCTURE: CPT

## 2019-12-28 PROCEDURE — 85730 THROMBOPLASTIN TIME PARTIAL: CPT

## 2019-12-28 PROCEDURE — 85025 COMPLETE CBC W/AUTO DIFF WBC: CPT

## 2019-12-28 PROCEDURE — 85610 PROTHROMBIN TIME: CPT

## 2019-12-30 ENCOUNTER — OFFICE VISIT (OUTPATIENT)
Dept: PHYSICAL THERAPY | Age: 49
End: 2019-12-30
Payer: COMMERCIAL

## 2019-12-30 DIAGNOSIS — M17.11 PRIMARY OSTEOARTHRITIS OF RIGHT KNEE: Primary | ICD-10-CM

## 2019-12-30 DIAGNOSIS — M17.12 OSTEOARTHRITIS OF LEFT KNEE, UNSPECIFIED OSTEOARTHRITIS TYPE: ICD-10-CM

## 2019-12-30 PROCEDURE — 97113 AQUATIC THERAPY/EXERCISES: CPT | Performed by: SPECIALIST/TECHNOLOGIST

## 2019-12-30 NOTE — PROGRESS NOTES
Daily Note     Today's date: 2019  Patient name: Radha Villalba  : 1970  MRN: [id number]  Referring provider: Jeanna Ashton MD  Dx:   Encounter Diagnosis     ICD-10-CM    1  Primary osteoarthritis of right knee M17 11    2  Osteoarthritis of left knee, unspecified osteoarthritis type M17 12                   Subjective: Pt reports aching knee pain this visit which she attributes to the weather changes  Objective: See treatment diary below    Assessment: Pt able to increase repetitions with step ups this visit demonstrating continued functional improvements however pt does continue to demonstrate antalgic gait cycle on land at times due to chronic nature of OA  Tolerated treatment well  Patient demonstrated fatigue post treatment, exhibited good technique with therapeutic exercises and would benefit from continued PT    Plan: Continue per plan of care  Progress treatment as tolerated         Precautions: N/A       POOL                    Blue cuff              5x lap ROBBINS/CD               Hip flex/ext/abd 30x 30x  30x                HS/piriformis stretch 30sec x5 30sec 5x  30sec 5x              Pool pony cycle 5 min blue cuff 5 min  With blue cuffs  5 min              LAQ 30x blue cuff 30x  With #'s  30x               HR  30x  30x               HS Curl 30x 30x  30x               Mini Squat 30x 30x  30x              Side stepping 5x 6x  6x               Marching 30x 30x  30x               SLRx3  30x 30x 30x               Step Ups FW/LAT 20x 20x  25x                                BW walking 6x 6x  6x                                                                                                    Manual                 Stm L knee/patela mobs

## 2020-01-03 ENCOUNTER — OFFICE VISIT (OUTPATIENT)
Dept: PHYSICAL THERAPY | Age: 50
End: 2020-01-03
Payer: COMMERCIAL

## 2020-01-03 DIAGNOSIS — M17.11 PRIMARY OSTEOARTHRITIS OF RIGHT KNEE: Primary | ICD-10-CM

## 2020-01-03 DIAGNOSIS — M17.12 OSTEOARTHRITIS OF LEFT KNEE, UNSPECIFIED OSTEOARTHRITIS TYPE: ICD-10-CM

## 2020-01-03 PROCEDURE — 97113 AQUATIC THERAPY/EXERCISES: CPT

## 2020-01-03 NOTE — PROGRESS NOTES
Daily Note     Today's date: 1/3/2020  Patient name: Mayra Grigsby  : 1970  MRN: [id number]  Referring provider: Alicia Cobb MD  Dx:   Encounter Diagnosis     ICD-10-CM    1  Primary osteoarthritis of right knee M17 11    2  Osteoarthritis of left knee, unspecified osteoarthritis type M17 12                   Subjective: Pt reports that she is feeling better today  1:1 25 min, indep TE remaining  Objective: See treatment diary below  Precautions: N/A       POOL  12/23  12/27 12/30 1/3            Blue cuff Blue cuff        5x lap ROBBINS/CD         Hip flex/ext/abd 30x 30x  30x  30x         HS/piriformis stretch 30sec x5 30sec 5x  30sec 5x         Pool pony cycle 5 min blue cuff 5 min  With blue cuffs  5 min         LAQ 30x blue cuff 30x  With #'s  30x  30x        HR  30x  30x  30x        HS Curl 30x 30x  30x  30x        Mini Squat 30x 30x  30x 30x        Side stepping 5x 6x  6x  6x        Marching 30x 30x  30x  30x        SLRx3  30x 30x 30x  30x        Step Ups FW/LAT 20x 20x  25x  25x                    BW walking 6x 6x  6x  6x                                                                         Manual            Stm L knee/patela mobs                       Assessment: Tolerated treatment well  Patient would benefit from continued PT  Fatigued post session  Plan: Continue per plan of care

## 2020-01-06 ENCOUNTER — OFFICE VISIT (OUTPATIENT)
Dept: PHYSICAL THERAPY | Age: 50
End: 2020-01-06
Payer: COMMERCIAL

## 2020-01-06 DIAGNOSIS — M17.11 PRIMARY OSTEOARTHRITIS OF RIGHT KNEE: Primary | ICD-10-CM

## 2020-01-06 DIAGNOSIS — M17.12 OSTEOARTHRITIS OF LEFT KNEE, UNSPECIFIED OSTEOARTHRITIS TYPE: ICD-10-CM

## 2020-01-06 PROCEDURE — 97113 AQUATIC THERAPY/EXERCISES: CPT | Performed by: SPECIALIST/TECHNOLOGIST

## 2020-01-06 NOTE — PROGRESS NOTES
Daily Note     Today's date: 2020  Patient name: Ancelmo Goodrich  : 1970  MRN: [id number]  Referring provider: Viraj Lopez MD  Dx:   Encounter Diagnosis     ICD-10-CM    1  Primary osteoarthritis of right knee M17 11    2  Osteoarthritis of left knee, unspecified osteoarthritis type M17 12                   Subjective: Pt reports L knee pain is so-so today  Objective: See treatment diary below    Assessment: Pt describes increased muscular fatigue this visit with increased resistance however appropriate to progress B LE strength  Tolerated treatment well  Patient demonstrated fatigue post treatment, exhibited good technique with therapeutic exercises and would benefit from continued PT    Plan: Continue per plan of care  Progress treatment as tolerated         Precautions: N/A       POOL  12/23  12/27 12/30 1/3 1/6           Blue cuff Blue cuff Red Cuff Wt       5x lap ROBBINS/CD        Hip flex/ext/abd 30x 30x  30x  30x  30x        HS/piriformis stretch 30sec x5 30sec 5x  30sec 5x  30sec 5x       Pool pony cycle 5 min blue cuff 5 min  With blue cuffs  5 min  5 min        LAQ 30x blue cuff 30x  With #'s  30x  30x 40x       HR  30x  30x  30x 30x       HS Curl 30x 30x  30x  30x 30x       Mini Squat 30x 30x  30x 30x 30x       Side stepping 5x 6x  6x  6x 7x       Marching 30x 30x  30x  30x 30x       SLRx3  30x 30x 30x  30x 30x       Step Ups FW/LAT 20x 20x  25x  25x 20x ea added step down                   BW walking 6x 6x  6x  6x 7x                                                                        Manual            Stm L knee/patela mobs

## 2020-01-08 ENCOUNTER — OFFICE VISIT (OUTPATIENT)
Dept: PHYSICAL THERAPY | Age: 50
End: 2020-01-08
Payer: COMMERCIAL

## 2020-01-08 DIAGNOSIS — M17.12 OSTEOARTHRITIS OF LEFT KNEE, UNSPECIFIED OSTEOARTHRITIS TYPE: ICD-10-CM

## 2020-01-08 DIAGNOSIS — M17.11 PRIMARY OSTEOARTHRITIS OF RIGHT KNEE: Primary | ICD-10-CM

## 2020-01-08 PROCEDURE — 97113 AQUATIC THERAPY/EXERCISES: CPT

## 2020-01-08 NOTE — PROGRESS NOTES
Daily Note     Today's date: 2020  Patient name: Arjun Javier  : 1970  MRN: [id number]  Referring provider: Eder Villela MD  Dx:   Encounter Diagnosis     ICD-10-CM    1  Primary osteoarthritis of right knee M17 11    2  Osteoarthritis of left knee, unspecified osteoarthritis type M17 12                   Subjective: Pt noted feeling better today  Objective: See treatment diary below    Assessment: No pain while in aqua environment  Plan: Continue per plan of care  Progress treatment as tolerated         Precautions: N/A       POOL  12/23  12/27 12/30 1/3 1/6 1/8          Blue cuff Blue cuff Red Cuff Wt Red weigts      5x lap ROBBINS/CD       Hip flex/ext/abd 30x 30x  30x  30x  30x  30x       HS/piriformis stretch 30sec x5 30sec 5x  30sec 5x  30sec 5x 30sec 5x       Pool pony cycle 5 min blue cuff 5 min  With blue cuffs  5 min  5 min  5 min       LAQ 30x blue cuff 30x  With #'s  30x  30x 40x 40x       HR  30x  30x  30x 30x 30x       HS Curl 30x 30x  30x  30x 30x 30x       Mini Squat 30x 30x  30x 30x 30x 30x       Side stepping 5x 6x  6x  6x 7x 7x       Marching 30x 30x  30x  30x 30x 30x       SLRx3  30x 30x 30x  30x 30x 30x       Step Ups FW/LAT 20x 20x  25x  25x 20x ea added step down 30x       Step forward       30x       BW walking 6x 6x  6x  6x 7x 7x       tamdem walk       6x                                                            Manual            Stm L knee/patela mobs

## 2020-01-13 ENCOUNTER — OFFICE VISIT (OUTPATIENT)
Dept: PHYSICAL THERAPY | Age: 50
End: 2020-01-13
Payer: COMMERCIAL

## 2020-01-13 DIAGNOSIS — M17.11 PRIMARY OSTEOARTHRITIS OF RIGHT KNEE: ICD-10-CM

## 2020-01-13 DIAGNOSIS — M17.12 OSTEOARTHRITIS OF LEFT KNEE, UNSPECIFIED OSTEOARTHRITIS TYPE: Primary | ICD-10-CM

## 2020-01-13 PROCEDURE — 97113 AQUATIC THERAPY/EXERCISES: CPT | Performed by: SPECIALIST/TECHNOLOGIST

## 2020-01-13 NOTE — PROGRESS NOTES
Daily Note     Today's date: 2020  Patient name: Tiffany Javed  : 1970  MRN: [id number]  Referring provider: Roderick Rodriguez MD  Dx:   Encounter Diagnosis     ICD-10-CM    1  Osteoarthritis of left knee, unspecified osteoarthritis type M17 12    2  Primary osteoarthritis of right knee M17 11                   Subjective: Pt reports L knee discomofrt persists however it is less severe in nature  Objective: See treatment diary below    Assessment: Pt's B LE strength is improving consistently demonstrated by gradual increase in cuff wt resistance with all therex without adverse effect on L knee joint irritation  Tolerated treatment well  Patient demonstrated fatigue post treatment, exhibited good technique with therapeutic exercises and would benefit from continued PT    Plan: Continue per plan of care  Progress treatment as tolerated         Precautions: N/A     POOL  12/23  12/27 12/30 1/3 1/6 1/8 1/13         Blue cuff Blue cuff Red Cuff Wt Red weigts Purple Wts     5x lap ROBBINS/CD /5 5/5  5/5 5/5 5/5  5     Hip flex/ext/abd 30x 30x  30x  30x  30x  30x  30x      HS/piriformis stretch, quad stretch 30sec x5 30sec 5x  30sec 5x  30sec 5x 30sec 5x  30sec 5x     Pool pony cycle 5 min blue cuff 5 min  With blue cuffs  5 min  5 min  5 min  5 min     LAQ 30x blue cuff 30x  With #'s  30x  30x 40x 40x  40x     HR  30x  30x  30x 30x 30x  30x      HS Curl 30x 30x  30x  30x 30x 30x  30x      Mini Squat 30x 30x  30x 30x 30x 30x  30x      Side stepping 5x 6x  6x  6x 7x 7x  7x      Marching 30x 30x  30x  30x 30x 30x  30x     SLRx3  30x 30x 30x  30x 30x 30x  30x      Step Ups FW/LAT 20x 20x  25x  25x 20x ea added step down 30x  30x      Step forward       30x  30x     BW walking 6x 6x  6x  6x 7x 7x  7x     tamdem walk       6x  6x                                                          Manual            Stm L knee/patela mobs            *Pt treated by JER Decker for 30 minutes of treatment session*

## 2020-01-15 ENCOUNTER — OFFICE VISIT (OUTPATIENT)
Dept: PHYSICAL THERAPY | Age: 50
End: 2020-01-15
Payer: COMMERCIAL

## 2020-01-15 DIAGNOSIS — M17.11 PRIMARY OSTEOARTHRITIS OF RIGHT KNEE: ICD-10-CM

## 2020-01-15 DIAGNOSIS — M17.12 OSTEOARTHRITIS OF LEFT KNEE, UNSPECIFIED OSTEOARTHRITIS TYPE: Primary | ICD-10-CM

## 2020-01-15 PROCEDURE — 97113 AQUATIC THERAPY/EXERCISES: CPT

## 2020-01-15 NOTE — PROGRESS NOTES
Daily Note     Today's date: 1/15/2020  Patient name: Zachariah Cole  : 1970  MRN: [id number]  Referring provider: Norman Ramirez MD  Dx:   Encounter Diagnosis     ICD-10-CM    1  Osteoarthritis of left knee, unspecified osteoarthritis type M17 12    2  Primary osteoarthritis of right knee M17 11                   Subjective: Pt reported decreased R knee pain since initiating PT     Objective: See treatment diary below    Assessment: Improving tolerance to exercises  Progress as able     Plan: Continue per plan of care  Progress treatment as tolerated         Precautions: N/A     POOL  12/23  12/27 12/30 1/3 1/6 1/8 1/13 1/15        Blue cuff Blue cuff Red Cuff Wt Red weigts Purple Wts Purple wts     5x lap ROBBINS/CD   5 5 5 5 5 5/5x     Hip flex/ext/abd 30x 30x  30x  30x  30x  30x  30x  30x    HS/piriformis stretch, quad stretch 30sec x5 30sec 5x  30sec 5x  30sec 5x 30sec 5x  30sec 5x 30sec 5x     Pool pony cycle 5 min blue cuff 5 min  With blue cuffs  5 min  5 min  5 min  5 min 5 min     LAQ 30x blue cuff 30x  With #'s  30x  30x 40x 40x  40x 40x     HR  30x  30x  30x 30x 30x  30x  30x    HS Curl 30x 30x  30x  30x 30x 30x  30x  30x    Mini Squat 30x 30x  30x 30x 30x 30x  30x  30x    Side stepping 5x 6x  6x  6x 7x 7x  7x  7x     Marching 30x 30x  30x  30x 30x 30x  30x 30x     SLRx3  30x 30x 30x  30x 30x 30x  30x  30x     Step Ups FW/LAT 2'0x 20x  25x  25x 20x ea added step down 30x  30x  30x     Step forward       30x  30x 30x     BW walking 6x 6x  6x  6x 7x 7x  7x 7x     tamdem walk       6x  6x 7x                                                          Manual            Stm L knee/patela mobs

## 2020-01-20 ENCOUNTER — OFFICE VISIT (OUTPATIENT)
Dept: PHYSICAL THERAPY | Age: 50
End: 2020-01-20
Payer: COMMERCIAL

## 2020-01-20 DIAGNOSIS — M17.11 PRIMARY OSTEOARTHRITIS OF RIGHT KNEE: ICD-10-CM

## 2020-01-20 DIAGNOSIS — M17.12 OSTEOARTHRITIS OF LEFT KNEE, UNSPECIFIED OSTEOARTHRITIS TYPE: Primary | ICD-10-CM

## 2020-01-20 PROCEDURE — 97113 AQUATIC THERAPY/EXERCISES: CPT | Performed by: SPECIALIST/TECHNOLOGIST

## 2020-01-20 NOTE — PROGRESS NOTES
Daily Note     Today's date: 2020  Patient name: Tiffany Javed  : 1970  MRN: [id number]  Referring provider: Roderick Rodriguez MD  Dx:   Encounter Diagnosis     ICD-10-CM    1  Osteoarthritis of left knee, unspecified osteoarthritis type M17 12    2  Primary osteoarthritis of right knee M17 11                   Subjective: B knee pain persists however is less severe per pt report  Objective: See treatment diary below    Assessment: Aquatic therex remain appropriately challenging at this time- pt is making good progress towards increased muscular control and ROM in preparation for surgery  Tolerated treatment well  Patient would benefit from continued PT  Plan: Continue per plan of care  Pt scheduled for re-evaluation with PT Will Updegrove next visit         Precautions: N/A     POOL  12/23  12/27 12/30 1/3 1/6 1/8 1/13 1/15 1/20       Blue cuff Blue cuff Red Cuff Wt Red weigts Purple Wts Purple wts  Purple wts   5x lap ROBBINS/CD /5 5/5  5/5 5/5 5/5 5/5 5/5 5/5x  5/5x   Hip flex/ext/abd 30x 30x  30x  30x  30x  30x  30x  30x 30x   HS/piriformis stretch, quad stretch 30sec x5 30sec 5x  30sec 5x  30sec 5x 30sec 5x  30sec 5x 30sec 5x  30sec 5x   Pool pony cycle 5 min blue cuff 5 min  With blue cuffs  5 min  5 min  5 min  5 min 5 min  5 min   LAQ 30x blue cuff 30x  With #'s  30x  30x 40x 40x  40x 40x  40x   HR  30x  30x  30x 30x 30x  30x  30x 40x   HS Curl 30x 30x  30x  30x 30x 30x  30x  30x 30x   Mini Squat 30x 30x  30x 30x 30x 30x  30x  30x 30x   Side stepping 5x 6x  6x  6x 7x 7x  7x  7x  7x   Marching 30x 30x  30x  30x 30x 30x  30x 30x  30x   SLRx3  30x 30x 30x  30x 30x 30x  30x  30x  30x   Step Ups FW/LAT 2'0x 20x  25x  25x 20x ea added step down 30x  30x  30x  30x   Step forward       30x  30x 30x  30x   BW walking 6x 6x  6x  6x 7x 7x  7x 7x  7x   tamdem walk       6x  6x 7x  7x                                                         Manual            Stm L knee/patela mobs

## 2020-01-21 NOTE — PRE-PROCEDURE INSTRUCTIONS
Pre-Surgery Instructions:   Medication Instructions    Ascorbic Acid (VITAMIN C GUMMIE PO) Instructed patient per Anesthesia Guidelines   Aspirin-Acetaminophen-Caffeine (EXCEDRIN PO) Instructed patient per Anesthesia Guidelines   Biotin 10 MG CAPS Instructed patient per Anesthesia Guidelines   COLLAGEN PO Instructed patient per Anesthesia Guidelines   Cyanocobalamin (VITAMIN B 12 PO) Instructed patient per Anesthesia Guidelines   Misc Natural Products (APPLE CIDER VINEGAR DIET PO) Instructed patient per Anesthesia Guidelines   naproxen (NAPROSYN) 500 mg tablet Instructed patient per Anesthesia Guidelines   Omega-3 1000 MG CAPS Instructed patient per Anesthesia Guidelines   VITAMIN A PO Instructed patient per Anesthesia Guidelines  Review with patient via phone medications and showering instructions  Advice don't take vitamins,suplements,NSAID'S week prior day of surgery  Advice [facility] and [facility] phone number  Verbalized understanding  Acetaminophen Med Class   Continue to take this medication on your normal schedule  If this is an oral medication and you take it in the morning, then you may take this medicine with a sip of water  [facility]A Med Class: Aspirin   Should be discontinued at least one week prior to planned operation, unless specifically stated otherwise by surgical service  Your Surgeon may have patient stop taking aspirin up to a week before surgery if having intracranial, middle ear, posterior eye, spine surgery or prostate surgery  [Patients taking aspirin for coronary stents should be reviewed by an anesthesiologist in the optimization clinic  Please do not discontinue aspirin in patients with coronary stents unless given specific permission to do so by the cardiologist who prescribed medication ]   If your surgeon approves please continue to take this medication on your normal schedule  You may take this medication on the morning of your surgery with a sip of water  Herbal Med Class   Stop taking this herbal medications at least one week prior to surgery/procedure  NSAID Med Class   Stop taking this medication at least 3 days prior to surgery/procedure  Vitamin Med Class   You may continue to take any vitamin that your surgeon has prescribed to you up to the day before surgery   If your surgeon has not specifically prescribed this vitamin or instructed you to continue then stop taking 7 days prior to surgery

## 2020-01-22 ENCOUNTER — EVALUATION (OUTPATIENT)
Dept: PHYSICAL THERAPY | Age: 50
End: 2020-01-22
Payer: COMMERCIAL

## 2020-01-22 DIAGNOSIS — M17.12 OSTEOARTHRITIS OF LEFT KNEE, UNSPECIFIED OSTEOARTHRITIS TYPE: Primary | ICD-10-CM

## 2020-01-22 PROCEDURE — 97164 PT RE-EVAL EST PLAN CARE: CPT | Performed by: PHYSICAL THERAPIST

## 2020-01-22 NOTE — PROGRESS NOTES
Daily Note     Today's date: 2020  Patient name: Elisha Hernández  : 1970  MRN: [id number]  Referring provider: Cricket Washington MD  Dx:   Encounter Diagnosis     ICD-10-CM    1  Osteoarthritis of left knee, unspecified osteoarthritis type M17 12                   Subjective: Pt reports feeling better overall      Objective: See treatment diary below  L Knee ROM: 0-125  L knee strength 5/5 flex, ext    Assessment: Tolerated treatment well  Patient pt was re-evaluated and was given exercises for pre/post op surgery   Plan: Continue per plan of care  Progress treatment as tolerated         Precautions: N/A

## 2020-01-24 ENCOUNTER — HOSPITAL ENCOUNTER (EMERGENCY)
Facility: HOSPITAL | Age: 50
Discharge: HOME | End: 2020-01-24
Attending: EMERGENCY MEDICINE | Admitting: EMERGENCY MEDICINE
Payer: COMMERCIAL

## 2020-01-24 ENCOUNTER — APPOINTMENT (EMERGENCY)
Dept: RADIOLOGY | Facility: HOSPITAL | Age: 50
End: 2020-01-24
Payer: COMMERCIAL

## 2020-01-24 VITALS
WEIGHT: 192.46 LBS | TEMPERATURE: 100.3 F | OXYGEN SATURATION: 95 % | DIASTOLIC BLOOD PRESSURE: 62 MMHG | BODY MASS INDEX: 35.2 KG/M2 | RESPIRATION RATE: 18 BRPM | SYSTOLIC BLOOD PRESSURE: 134 MMHG | HEART RATE: 104 BPM

## 2020-01-24 DIAGNOSIS — J11.1 INFLUENZA: Primary | ICD-10-CM

## 2020-01-24 LAB
FLUAV RNA NPH QL NAA+PROBE: DETECTED
FLUBV RNA NPH QL NAA+PROBE: ABNORMAL
RSV RNA NPH QL NAA+PROBE: ABNORMAL
S PYO DNA THROAT QL NAA+PROBE: NORMAL

## 2020-01-24 PROCEDURE — 99283 EMERGENCY DEPT VISIT LOW MDM: CPT | Performed by: EMERGENCY MEDICINE

## 2020-01-24 PROCEDURE — 99283 EMERGENCY DEPT VISIT LOW MDM: CPT

## 2020-01-24 PROCEDURE — 87631 RESP VIRUS 3-5 TARGETS: CPT | Performed by: PHYSICIAN ASSISTANT

## 2020-01-24 PROCEDURE — 87651 STREP A DNA AMP PROBE: CPT | Performed by: PHYSICIAN ASSISTANT

## 2020-01-24 PROCEDURE — 71046 X-RAY EXAM CHEST 2 VIEWS: CPT

## 2020-01-24 RX ORDER — BENZONATATE 100 MG/1
100 CAPSULE ORAL 3 TIMES DAILY PRN
Qty: 30 CAPSULE | Refills: 0 | Status: SHIPPED | OUTPATIENT
Start: 2020-01-24 | End: 2020-04-27

## 2020-01-24 RX ORDER — ACETAMINOPHEN 325 MG/1
650 TABLET ORAL ONCE
Status: COMPLETED | OUTPATIENT
Start: 2020-01-24 | End: 2020-01-24

## 2020-01-24 RX ORDER — OSELTAMIVIR PHOSPHATE 75 MG/1
75 CAPSULE ORAL EVERY 12 HOURS
Qty: 10 CAPSULE | Refills: 0 | Status: SHIPPED | OUTPATIENT
Start: 2020-01-24 | End: 2020-01-29

## 2020-01-24 RX ADMIN — ACETAMINOPHEN 650 MG: 325 TABLET ORAL at 22:11

## 2020-01-25 NOTE — H&P (VIEW-ONLY)
History  Chief Complaint   Patient presents with    Fever - 9 weeks to 74 years     pt reports fever, cough, sore throat  mucinex at 6pm and tylenol at 2pm      49y  o female with PMH of arthritis and migraines presents to the ER for sore throat, fever (max 103) and cough for 1 day  Patient has been taking Mucinex for symptoms  Symptoms are constant  She denies sick contacts or recent travel  She denies chills, rhinorrhea/congestion, chest pain, dyspnea, N/V/D, abdominal pain, weakness or paresthesias  History provided by:  Patient   used: No        Prior to Admission Medications   Prescriptions Last Dose Informant Patient Reported? Taking? Ascorbic Acid (VITAMIN C GUMMIE PO)   Yes No   Sig: Take 2 gums by mouth daily   Aspirin-Acetaminophen-Caffeine (EXCEDRIN PO)   Yes No   Sig: Take by mouth as needed   Biotin 10 MG CAPS  Self Yes No   Sig: Take 1 capsule by mouth daily   COLLAGEN PO   Yes No   Sig: Take by mouth daily   Cyanocobalamin (VITAMIN B 12 PO)   Yes No   Sig: Take by mouth every other day   Misc Natural Products (APPLE CIDER VINEGAR DIET PO)   Yes No   Sig: Take by mouth daily   Omega-3 1000 MG CAPS  Self Yes No   Sig: Take 1 capsule by mouth daily   VITAMIN A PO   Yes No   Sig: Take by mouth every other day   naproxen (NAPROSYN) 500 mg tablet   No No   Sig: Take 1 tablet (500 mg total) by mouth 2 (two) times a day with meals for 120 days   Patient taking differently: Take 500 mg by mouth as needed       Facility-Administered Medications: None       Past Medical History:   Diagnosis Date    Arthritis     Bacterial vaginosis     Migraine     Obesity     Varicella        Past Surgical History:   Procedure Laterality Date    NO PAST SURGERIES         Family History   Problem Relation Age of Onset    Prostate cancer Father     Cancer Father     Hypertension Mother      I have reviewed and agree with the history as documented      Social History     Tobacco Use    Smoking status: Never Smoker    Smokeless tobacco: Never Used   Substance Use Topics    Alcohol use: Yes     Alcohol/week: 1 0 standard drinks     Types: 1 Cans of beer per week     Frequency: Monthly or less     Drinks per session: 1 or 2     Binge frequency: Never     Comment: rare    Drug use: No        Review of Systems   Constitutional: Positive for fever  Negative for activity change, appetite change and chills  HENT: Positive for sore throat  Negative for congestion, drooling, ear discharge, ear pain, facial swelling and rhinorrhea  Eyes: Negative for redness  Respiratory: Positive for cough  Negative for shortness of breath  Cardiovascular: Negative for chest pain  Gastrointestinal: Negative for abdominal pain, diarrhea, nausea and vomiting  Musculoskeletal: Negative for neck stiffness  Skin: Negative for rash  Allergic/Immunologic: Negative for food allergies  Neurological: Negative for weakness and numbness  Physical Exam  Physical Exam   Constitutional:  Non-toxic appearance  No distress  HENT:   Head: Normocephalic and atraumatic  Right Ear: Tympanic membrane, external ear and ear canal normal  No drainage, swelling or tenderness  No foreign bodies  Tympanic membrane is not erythematous  No hemotympanum  Left Ear: Tympanic membrane, external ear and ear canal normal  No drainage, swelling or tenderness  No foreign bodies  Tympanic membrane is not erythematous  No hemotympanum  Nose: Nose normal    Mouth/Throat: Uvula is midline and mucous membranes are normal  No uvula swelling  Posterior oropharyngeal erythema present  No posterior oropharyngeal edema or tonsillar abscesses  No tonsillar exudate  Neck: Normal range of motion and phonation normal  Neck supple  No tracheal deviation present  Cardiovascular: Normal rate, regular rhythm, S1 normal, S2 normal and normal heart sounds  Exam reveals no gallop and no friction rub  No murmur heard    Pulmonary/Chest: Effort normal and breath sounds normal  No respiratory distress  She has no decreased breath sounds  She has no wheezes  She has no rhonchi  She has no rales  She exhibits no tenderness  Neurological: She is alert  GCS eye subscore is 4  GCS verbal subscore is 5  GCS motor subscore is 6  Skin: Skin is warm and dry  No rash noted  Psychiatric: She has a normal mood and affect  Nursing note and vitals reviewed  Vital Signs  ED Triage Vitals   Temperature Pulse Respirations Blood Pressure SpO2   01/24/20 2131 01/24/20 2131 01/24/20 2131 01/24/20 2133 01/24/20 2131   (!) 101 3 °F (38 5 °C) (!) 128 16 145/74 97 %      Temp Source Heart Rate Source Patient Position - Orthostatic VS BP Location FiO2 (%)   01/24/20 2131 01/24/20 2131 01/24/20 2131 01/24/20 2131 --   Temporal Monitor Sitting Right arm       Pain Score       --                  Vitals:    01/24/20 2131 01/24/20 2133   BP:  145/74   Pulse: (!) 128    Patient Position - Orthostatic VS: Sitting Sitting         Visual Acuity      ED Medications  Medications   acetaminophen (TYLENOL) tablet 650 mg (650 mg Oral Given 1/24/20 2211)       Diagnostic Studies  Results Reviewed     Procedure Component Value Units Date/Time    Influenza A/B and RSV PCR [[id number]]  (Abnormal) Collected:  01/24/20 2211    Lab Status:  Final result Specimen:  Nares from Nose Updated:  01/24/20 2259     INFLUENZA A PCR Detected     INFLUENZA B PCR None Detected     RSV PCR None Detected    Strep A PCR [[id number]] Collected:  01/24/20 2211    Lab Status: In process Specimen:  Throat Updated:  01/24/20 2215                 No orders to display              Procedures  Procedures         ED Course                               MDM  Number of Diagnoses or Management Options  Influenza: new and requires workup  Diagnosis management comments: DDX consists of but not limited to: viral syndrome, strep, abscess, mono, pneumonia, flu    Will check strep and flu  Flu positive   Will check CXR to ensure no pneumonia  Informed patient I did not see any acute abnormalities on xray at this time and if the radiologist saw anything concerning when reading the xray, we would call to inform them  Patient agreeable  At discharge, I instructed the patient to:  -follow up with pcp  -take medications as prescribed  -rest and drink plenty of fluids  -return to the ER if symptoms worsened or new symptoms arose  Patient agreed to this plan and was stable at time of discharge  Amount and/or Complexity of Data Reviewed  Clinical lab tests: ordered and reviewed  Tests in the radiology section of CPT®: ordered and reviewed  Independent visualization of images, tracings, or specimens: yes    Patient Progress  Patient progress: stable        Disposition  Final diagnoses:   None     ED Disposition     None      Follow-up Information    None         Patient's Medications   Discharge Prescriptions    No medications on file     No discharge procedures on file      ED Provider  Electronically Signed by           Jsesi Guevara PA-C  01/24/20 7407

## 2020-01-25 NOTE — DISCHARGE INSTRUCTIONS
DISCHARGE INSTRUCTIONS:    FOLLOW UP WITH YOUR PRIMARY CARE PROVIDER OR THE [address] [place] [address]  MAKE AN APPOINTMENT TO BE SEEN  TAKE MEDICATIONS AS PRESCRIBED  IF RASH, SHORTNESS OF BREATH OR TROUBLE SWALLOWING, STOP TAKING THE MEDICATION AND BE SEEN  REST AND DRINK PLENTY OF FLUIDS  IF SYMPTOMS WORSEN OR NEW SYMPTOMS ARISE, RETURN TO THE ER TO BE SEEN

## 2020-01-25 NOTE — ED PROVIDER NOTES
History  Chief Complaint   Patient presents with    Fever - 9 weeks to 74 years     pt reports fever, cough, sore throat  mucinex at 6pm and tylenol at 2pm      49y  o female with PMH of arthritis and migraines presents to the ER for sore throat, fever (max 103) and cough for 1 day  Patient has been taking Mucinex for symptoms  Symptoms are constant  She denies sick contacts or recent travel  She denies chills, rhinorrhea/congestion, chest pain, dyspnea, N/V/D, abdominal pain, weakness or paresthesias  History provided by:  Patient   used: No        Prior to Admission Medications   Prescriptions Last Dose Informant Patient Reported? Taking? Ascorbic Acid (VITAMIN C GUMMIE PO)   Yes No   Sig: Take 2 gums by mouth daily   Aspirin-Acetaminophen-Caffeine (EXCEDRIN PO)   Yes No   Sig: Take by mouth as needed   Biotin 10 MG CAPS  Self Yes No   Sig: Take 1 capsule by mouth daily   COLLAGEN PO   Yes No   Sig: Take by mouth daily   Cyanocobalamin (VITAMIN B 12 PO)   Yes No   Sig: Take by mouth every other day   Misc Natural Products (APPLE CIDER VINEGAR DIET PO)   Yes No   Sig: Take by mouth daily   Omega-3 1000 MG CAPS  Self Yes No   Sig: Take 1 capsule by mouth daily   VITAMIN A PO   Yes No   Sig: Take by mouth every other day   naproxen (NAPROSYN) 500 mg tablet   No No   Sig: Take 1 tablet (500 mg total) by mouth 2 (two) times a day with meals for 120 days   Patient taking differently: Take 500 mg by mouth as needed       Facility-Administered Medications: None       Past Medical History:   Diagnosis Date    Arthritis     Bacterial vaginosis     Migraine     Obesity     Varicella        Past Surgical History:   Procedure Laterality Date    NO PAST SURGERIES         Family History   Problem Relation Age of Onset    Prostate cancer Father     Cancer Father     Hypertension Mother      I have reviewed and agree with the history as documented      Social History     Tobacco Use    Smoking status: Never Smoker    Smokeless tobacco: Never Used   Substance Use Topics    Alcohol use: Yes     Alcohol/week: 1 0 standard drinks     Types: 1 Cans of beer per week     Frequency: Monthly or less     Drinks per session: 1 or 2     Binge frequency: Never     Comment: rare    Drug use: No        Review of Systems   Constitutional: Positive for fever  Negative for activity change, appetite change and chills  HENT: Positive for sore throat  Negative for congestion, drooling, ear discharge, ear pain, facial swelling and rhinorrhea  Eyes: Negative for redness  Respiratory: Positive for cough  Negative for shortness of breath  Cardiovascular: Negative for chest pain  Gastrointestinal: Negative for abdominal pain, diarrhea, nausea and vomiting  Musculoskeletal: Negative for neck stiffness  Skin: Negative for rash  Allergic/Immunologic: Negative for food allergies  Neurological: Negative for weakness and numbness  Physical Exam  Physical Exam   Constitutional:  Non-toxic appearance  No distress  HENT:   Head: Normocephalic and atraumatic  Right Ear: Tympanic membrane, external ear and ear canal normal  No drainage, swelling or tenderness  No foreign bodies  Tympanic membrane is not erythematous  No hemotympanum  Left Ear: Tympanic membrane, external ear and ear canal normal  No drainage, swelling or tenderness  No foreign bodies  Tympanic membrane is not erythematous  No hemotympanum  Nose: Nose normal    Mouth/Throat: Uvula is midline and mucous membranes are normal  No uvula swelling  Posterior oropharyngeal erythema present  No posterior oropharyngeal edema or tonsillar abscesses  No tonsillar exudate  Neck: Normal range of motion and phonation normal  Neck supple  No tracheal deviation present  Cardiovascular: Normal rate, regular rhythm, S1 normal, S2 normal and normal heart sounds  Exam reveals no gallop and no friction rub  No murmur heard    Pulmonary/Chest: Effort normal and breath sounds normal  No respiratory distress  She has no decreased breath sounds  She has no wheezes  She has no rhonchi  She has no rales  She exhibits no tenderness  Neurological: She is alert  GCS eye subscore is 4  GCS verbal subscore is 5  GCS motor subscore is 6  Skin: Skin is warm and dry  No rash noted  Psychiatric: She has a normal mood and affect  Nursing note and vitals reviewed  Vital Signs  ED Triage Vitals   Temperature Pulse Respirations Blood Pressure SpO2   01/24/20 2131 01/24/20 2131 01/24/20 2131 01/24/20 2133 01/24/20 2131   (!) 101 3 °F (38 5 °C) (!) 128 16 145/74 97 %      Temp Source Heart Rate Source Patient Position - Orthostatic VS BP Location FiO2 (%)   01/24/20 2131 01/24/20 2131 01/24/20 2131 01/24/20 2131 --   Temporal Monitor Sitting Right arm       Pain Score       --                  Vitals:    01/24/20 2131 01/24/20 2133   BP:  145/74   Pulse: (!) 128    Patient Position - Orthostatic VS: Sitting Sitting         Visual Acuity      ED Medications  Medications   acetaminophen (TYLENOL) tablet 650 mg (650 mg Oral Given 1/24/20 2211)       Diagnostic Studies  Results Reviewed     Procedure Component Value Units Date/Time    Influenza A/B and RSV PCR [[id number]]  (Abnormal) Collected:  01/24/20 2211    Lab Status:  Final result Specimen:  Nares from Nose Updated:  01/24/20 2259     INFLUENZA A PCR Detected     INFLUENZA B PCR None Detected     RSV PCR None Detected    Strep A PCR [[id number]] Collected:  01/24/20 2211    Lab Status: In process Specimen:  Throat Updated:  01/24/20 2215                 No orders to display              Procedures  Procedures         ED Course                               MDM  Number of Diagnoses or Management Options  Influenza: new and requires workup  Diagnosis management comments: DDX consists of but not limited to: viral syndrome, strep, abscess, mono, pneumonia, flu    Will check strep and flu  Flu positive   Will check CXR to ensure no pneumonia  Informed patient I did not see any acute abnormalities on xray at this time and if the radiologist saw anything concerning when reading the xray, we would call to inform them  Patient agreeable  At discharge, I instructed the patient to:  -follow up with pcp  -take medications as prescribed  -rest and drink plenty of fluids  -return to the ER if symptoms worsened or new symptoms arose  Patient agreed to this plan and was stable at time of discharge  Amount and/or Complexity of Data Reviewed  Clinical lab tests: ordered and reviewed  Tests in the radiology section of CPT®: ordered and reviewed  Independent visualization of images, tracings, or specimens: yes    Patient Progress  Patient progress: stable        Disposition  Final diagnoses:   None     ED Disposition     None      Follow-up Information    None         Patient's Medications   Discharge Prescriptions    No medications on file     No discharge procedures on file      ED Provider  Electronically Signed by           Heather Inman PA-C  01/24/20 7208

## 2020-01-27 ENCOUNTER — TELEPHONE (OUTPATIENT)
Dept: OBGYN CLINIC | Facility: HOSPITAL | Age: 50
End: 2020-01-27

## 2020-01-27 NOTE — TELEPHONE ENCOUNTER
Patient sees Dr Melissa Lopez  Patient is scheduled for surgery on 1/29 and currently has the flu  She is calling in wanting to know if this is going to be cancelled for a later time, she was diagnosed with this on 1/24 in the ER  She is asking if someone can contact her back relating this    Call back# [phone]

## 2020-02-11 ENCOUNTER — ANESTHESIA EVENT (OUTPATIENT)
Dept: PERIOP | Facility: HOSPITAL | Age: 50
End: 2020-02-11
Payer: COMMERCIAL

## 2020-02-11 NOTE — ANESTHESIA PREPROCEDURE EVALUATION
Review of Systems/Medical History  Patient summary reviewed  Chart reviewed      Cardiovascular  Negative cardio ROS Exercise tolerance (METS): >4,     Pulmonary  Negative pulmonary ROS        GI/Hepatic  Negative GI/hepatic ROS          Negative  ROS        Endo/Other  Negative endo/other ROS      GYN  Negative gynecology ROS          Hematology  Negative hematology ROS      Musculoskeletal  Negative musculoskeletal ROS        Neurology  Negative neurology ROS      Psychology   Negative psychology ROS              Physical Exam    Airway    Mallampati score: II  TM Distance: >3 FB  Neck ROM: full     Dental   No notable dental hx     Cardiovascular  Comment: Negative ROS, Rhythm: regular, Rate: normal, Cardiovascular exam normal    Pulmonary  Pulmonary exam normal Breath sounds clear to auscultation,     Other Findings        Anesthesia Plan  ASA Score- 2     Anesthesia Type- general with ASA Monitors  Additional Monitors:   Airway Plan: LMA  Comment: Risks/benefits and alternatives discussed with patient including possible PONV, sore throat, and possibility of rare anesthetic and surgical emergencies        Plan Factors- Patient instructed to abstain from smoking on day of procedure  Patient did not smoke on day of surgery  Induction- intravenous  Postoperative Plan- Plan for postoperative opioid use  Planned trial extubation    Informed Consent- Anesthetic plan and risks discussed with patient  I personally reviewed this patient with the CRNA  Discussed and agreed on the Anesthesia Plan with the CRNA  Lamberto Erazo

## 2020-02-12 ENCOUNTER — ANESTHESIA (OUTPATIENT)
Dept: PERIOP | Facility: HOSPITAL | Age: 50
End: 2020-02-12
Payer: COMMERCIAL

## 2020-02-12 ENCOUNTER — HOSPITAL ENCOUNTER (OUTPATIENT)
Facility: HOSPITAL | Age: 50
Setting detail: OUTPATIENT SURGERY
Discharge: HOME | End: 2020-02-12
Attending: ORTHOPAEDIC SURGERY | Admitting: ORTHOPAEDIC SURGERY
Payer: COMMERCIAL

## 2020-02-12 VITALS
DIASTOLIC BLOOD PRESSURE: 68 MMHG | WEIGHT: 188 LBS | BODY MASS INDEX: 34.6 KG/M2 | RESPIRATION RATE: 16 BRPM | TEMPERATURE: 100.1 F | HEIGHT: 62 IN | SYSTOLIC BLOOD PRESSURE: 119 MMHG | HEART RATE: 94 BPM | OXYGEN SATURATION: 100 %

## 2020-02-12 DIAGNOSIS — M23.8X2 LAXITY OF LEFT ANTERIOR CRUCIATE LIGAMENT: Primary | ICD-10-CM

## 2020-02-12 PROCEDURE — 29999 UNLISTED PX ARTHROSCOPY: CPT | Performed by: ORTHOPAEDIC SURGERY

## 2020-02-12 PROCEDURE — 99024 POSTOP FOLLOW-UP VISIT: CPT | Performed by: ORTHOPAEDIC SURGERY

## 2020-02-12 RX ORDER — CHLORHEXIDINE GLUCONATE 4 G/100ML
SOLUTION TOPICAL DAILY PRN
Status: DISCONTINUED | OUTPATIENT
Start: 2020-02-12 | End: 2020-02-12 | Stop reason: HOSPADM

## 2020-02-12 RX ORDER — BUPIVACAINE HYDROCHLORIDE AND EPINEPHRINE 5; 5 MG/ML; UG/ML
INJECTION, SOLUTION PERINEURAL AS NEEDED
Status: DISCONTINUED | OUTPATIENT
Start: 2020-02-12 | End: 2020-02-12 | Stop reason: HOSPADM

## 2020-02-12 RX ORDER — OXYCODONE HYDROCHLORIDE 5 MG/1
TABLET ORAL
Qty: 10 TABLET | Refills: 0 | Status: SHIPPED | OUTPATIENT
Start: 2020-02-13 | End: 2021-05-28

## 2020-02-12 RX ORDER — LIDOCAINE HYDROCHLORIDE 10 MG/ML
0.5 INJECTION, SOLUTION EPIDURAL; INFILTRATION; INTRACAUDAL; PERINEURAL ONCE AS NEEDED
Status: COMPLETED | OUTPATIENT
Start: 2020-02-12 | End: 2020-02-12

## 2020-02-12 RX ORDER — MORPHINE SULFATE 10 MG/ML
INJECTION, SOLUTION INTRAMUSCULAR; INTRAVENOUS AS NEEDED
Status: DISCONTINUED | OUTPATIENT
Start: 2020-02-12 | End: 2020-02-12 | Stop reason: HOSPADM

## 2020-02-12 RX ORDER — OXYCODONE HYDROCHLORIDE 5 MG/1
5 TABLET ORAL EVERY 4 HOURS PRN
Status: DISCONTINUED | OUTPATIENT
Start: 2020-02-12 | End: 2020-02-12 | Stop reason: HOSPADM

## 2020-02-12 RX ORDER — MIDAZOLAM HYDROCHLORIDE 2 MG/2ML
INJECTION, SOLUTION INTRAMUSCULAR; INTRAVENOUS AS NEEDED
Status: DISCONTINUED | OUTPATIENT
Start: 2020-02-12 | End: 2020-02-12 | Stop reason: SURG

## 2020-02-12 RX ORDER — CHLORHEXIDINE GLUCONATE 0.12 MG/ML
15 RINSE ORAL ONCE
Status: DISCONTINUED | OUTPATIENT
Start: 2020-02-12 | End: 2020-02-12

## 2020-02-12 RX ORDER — LIDOCAINE HYDROCHLORIDE 10 MG/ML
INJECTION, SOLUTION EPIDURAL; INFILTRATION; INTRACAUDAL; PERINEURAL AS NEEDED
Status: DISCONTINUED | OUTPATIENT
Start: 2020-02-12 | End: 2020-02-12 | Stop reason: SURG

## 2020-02-12 RX ORDER — VANCOMYCIN HYDROCHLORIDE 1 G/200ML
1000 INJECTION, SOLUTION INTRAVENOUS ONCE
Status: COMPLETED | OUTPATIENT
Start: 2020-02-12 | End: 2020-02-12

## 2020-02-12 RX ORDER — SODIUM CHLORIDE, SODIUM LACTATE, POTASSIUM CHLORIDE, CALCIUM CHLORIDE 600; 310; 30; 20 MG/100ML; MG/100ML; MG/100ML; MG/100ML
75 INJECTION, SOLUTION INTRAVENOUS CONTINUOUS
Status: DISCONTINUED | OUTPATIENT
Start: 2020-02-12 | End: 2020-02-12 | Stop reason: HOSPADM

## 2020-02-12 RX ORDER — DEXAMETHASONE SODIUM PHOSPHATE 10 MG/ML
INJECTION, SOLUTION INTRAMUSCULAR; INTRAVENOUS AS NEEDED
Status: DISCONTINUED | OUTPATIENT
Start: 2020-02-12 | End: 2020-02-12 | Stop reason: SURG

## 2020-02-12 RX ORDER — PROPOFOL 10 MG/ML
INJECTION, EMULSION INTRAVENOUS AS NEEDED
Status: DISCONTINUED | OUTPATIENT
Start: 2020-02-12 | End: 2020-02-12 | Stop reason: SURG

## 2020-02-12 RX ORDER — HYDROMORPHONE HCL/PF 1 MG/ML
0.2 SYRINGE (ML) INJECTION
Status: DISCONTINUED | OUTPATIENT
Start: 2020-02-12 | End: 2020-02-12 | Stop reason: HOSPADM

## 2020-02-12 RX ORDER — ONDANSETRON 2 MG/ML
4 INJECTION INTRAMUSCULAR; INTRAVENOUS ONCE AS NEEDED
Status: DISCONTINUED | OUTPATIENT
Start: 2020-02-12 | End: 2020-02-12 | Stop reason: HOSPADM

## 2020-02-12 RX ORDER — SODIUM CHLORIDE, SODIUM LACTATE, POTASSIUM CHLORIDE, CALCIUM CHLORIDE 600; 310; 30; 20 MG/100ML; MG/100ML; MG/100ML; MG/100ML
20 INJECTION, SOLUTION INTRAVENOUS CONTINUOUS
Status: DISCONTINUED | OUTPATIENT
Start: 2020-02-12 | End: 2020-02-12

## 2020-02-12 RX ORDER — OXYCODONE HYDROCHLORIDE 5 MG/1
TABLET ORAL
Qty: 10 TABLET | Refills: 0 | Status: SHIPPED | OUTPATIENT
Start: 2020-02-13 | End: 2020-02-12 | Stop reason: SDUPTHER

## 2020-02-12 RX ORDER — FENTANYL CITRATE 50 UG/ML
INJECTION, SOLUTION INTRAMUSCULAR; INTRAVENOUS AS NEEDED
Status: DISCONTINUED | OUTPATIENT
Start: 2020-02-12 | End: 2020-02-12 | Stop reason: SURG

## 2020-02-12 RX ORDER — FENTANYL CITRATE/PF 50 MCG/ML
25 SYRINGE (ML) INJECTION
Status: DISCONTINUED | OUTPATIENT
Start: 2020-02-12 | End: 2020-02-12 | Stop reason: HOSPADM

## 2020-02-12 RX ORDER — ONDANSETRON 2 MG/ML
INJECTION INTRAMUSCULAR; INTRAVENOUS AS NEEDED
Status: DISCONTINUED | OUTPATIENT
Start: 2020-02-12 | End: 2020-02-12 | Stop reason: SURG

## 2020-02-12 RX ORDER — DOCUSATE SODIUM 100 MG/1
100 CAPSULE, LIQUID FILLED ORAL 2 TIMES DAILY
Qty: 10 CAPSULE | Refills: 0 | Status: SHIPPED | OUTPATIENT
Start: 2020-02-12 | End: 2021-05-28 | Stop reason: ALTCHOICE

## 2020-02-12 RX ORDER — DOCUSATE SODIUM 100 MG/1
100 CAPSULE, LIQUID FILLED ORAL 2 TIMES DAILY
Qty: 10 CAPSULE | Refills: 0 | Status: SHIPPED | OUTPATIENT
Start: 2020-02-12 | End: 2020-02-12 | Stop reason: SDUPTHER

## 2020-02-12 RX ADMIN — FENTANYL CITRATE 50 MCG: 50 INJECTION, SOLUTION INTRAMUSCULAR; INTRAVENOUS at 13:18

## 2020-02-12 RX ADMIN — PROPOFOL 200 MG: 10 INJECTION, EMULSION INTRAVENOUS at 12:41

## 2020-02-12 RX ADMIN — VANCOMYCIN HYDROCHLORIDE 1000 MG: 1 INJECTION, SOLUTION INTRAVENOUS at 12:29

## 2020-02-12 RX ADMIN — DEXAMETHASONE SODIUM PHOSPHATE 10 MG: 10 INJECTION, SOLUTION INTRAMUSCULAR; INTRAVENOUS at 13:56

## 2020-02-12 RX ADMIN — MIDAZOLAM 2 MG: 1 INJECTION INTRAMUSCULAR; INTRAVENOUS at 12:33

## 2020-02-12 RX ADMIN — LIDOCAINE HYDROCHLORIDE 50 MG: 10 INJECTION, SOLUTION EPIDURAL; INFILTRATION; INTRACAUDAL; PERINEURAL at 12:41

## 2020-02-12 RX ADMIN — FENTANYL CITRATE 50 MCG: 50 INJECTION, SOLUTION INTRAMUSCULAR; INTRAVENOUS at 13:00

## 2020-02-12 RX ADMIN — LIDOCAINE HYDROCHLORIDE 0.5 ML: 10 INJECTION, SOLUTION EPIDURAL; INFILTRATION; INTRACAUDAL; PERINEURAL at 11:55

## 2020-02-12 RX ADMIN — OXYCODONE HYDROCHLORIDE 5 MG: 5 TABLET ORAL at 15:54

## 2020-02-12 RX ADMIN — SODIUM CHLORIDE, SODIUM LACTATE, POTASSIUM CHLORIDE, AND CALCIUM CHLORIDE 20 ML/HR: .6; .31; .03; .02 INJECTION, SOLUTION INTRAVENOUS at 11:55

## 2020-02-12 RX ADMIN — ONDANSETRON 4 MG: 2 INJECTION INTRAMUSCULAR; INTRAVENOUS at 14:11

## 2020-02-12 RX ADMIN — FENTANYL CITRATE 25 MCG: 50 INJECTION, SOLUTION INTRAMUSCULAR; INTRAVENOUS at 15:04

## 2020-02-12 RX ADMIN — VANCOMYCIN HYDROCHLORIDE 1000 MG: 1 INJECTION, SOLUTION INTRAVENOUS at 13:29

## 2020-02-12 RX ADMIN — FENTANYL CITRATE 25 MCG: 50 INJECTION, SOLUTION INTRAMUSCULAR; INTRAVENOUS at 14:48

## 2020-02-12 RX ADMIN — PHENYLEPHRINE HYDROCHLORIDE 100 MCG: 10 INJECTION INTRAVENOUS at 13:54

## 2020-02-12 NOTE — H&P
[address] 47 y o  female MRN: [id number]  Unit/Bed#: [facility] 13      Chief Complaint:   left knee pain    HPI:   47 y  o female complaining of left knee pain  She has history of Left knee osteoarthritis and left medial meniscus tear complex  She tried conservative measures without success  She was seen in the office on December and scheduled for Left knee arthroscopy w meniscectomy  Consent signed back then  Review Of Systems:   · Skin: Normal  · Neuro: See HPI  · Musculoskeletal: See HPI  · 14 point review of systems negative except as stated above     Past Medical History:   Past Medical History:   Diagnosis Date    Arthritis     Bacterial vaginosis     Migraine     Obesity     Varicella        Past Surgical History:   Past Surgical History:   Procedure Laterality Date    NO PAST SURGERIES         Family History:  Family history reviewed and non-contributory  Family History   Problem Relation Age of Onset    Prostate cancer Father     Cancer Father     Hypertension Mother        Social History:  Social History     Socioeconomic History    Marital status: /Civil Union     Spouse name: None    Number of children: 1    Years of education: None    Highest education level: None   Occupational History    None   Social Needs    Financial resource strain: None    Food insecurity:     Worry: None     Inability: None    Transportation needs:     Medical: None     Non-medical: None   Tobacco Use    Smoking status: Never Smoker    Smokeless tobacco: Never Used   Substance and Sexual Activity    Alcohol use:  Yes     Alcohol/week: 1 0 standard drinks     Types: 1 Cans of beer per week     Frequency: Monthly or less     Drinks per session: 1 or 2     Binge frequency: Never     Comment: rare    Drug use: No    Sexual activity: Yes     Partners: Male   Lifestyle    Physical activity:     Days per week: None     Minutes per session: None    Stress: None   Relationships    Social connections:     Talks on phone: None     Gets together: None     Attends Oriental orthodox service: None     Active member of club or organization: None     Attends meetings of clubs or organizations: None     Relationship status: None    Intimate partner violence:     Fear of current or ex partner: None     Emotionally abused: None     Physically abused: None     Forced sexual activity: None   Other Topics Concern    None   Social History Narrative    Uses seatbelts       Allergies: Allergies   Allergen Reactions    Penicillins Anaphylaxis           Labs:  0   Lab Value Date/Time    HCT 49 0 (H) 12/28/2019 0944    HCT 45 9 07/11/2019 1134    HGB 15 2 12/28/2019 0944    HGB 14 2 07/11/2019 1134    INR 1 04 12/28/2019 0944    WBC 8 35 12/28/2019 0944    WBC 7 89 07/11/2019 1134    ESR 13 08/17/2019 1043    CRP 8 7 (H) 07/11/2019 1134       Meds:    Current Facility-Administered Medications:     chlorhexidine (HIBICLENS) 4 % topical liquid, , Topical, Daily PRN, Nelly Marrero PA-C    vancomycin (VANCOCIN) IVPB (premix) 1,000 mg, 1,000 mg, Intravenous, Once, Nelly Marrero PA-C    Blood Culture:   No results found for: BLOODCX    Wound Culture:   No results found for: WOUNDCULT    Ins and Outs:  No intake/output data recorded  Physical Exam:   /69   Pulse 82   Temp 99 9 °F (37 7 °C) (Tympanic)   Resp 20   SpO2 100%   Gen: Alert and oriented to person, place, time  HEENT: EOMI, eyes clear, moist mucus membranes, hearing intact  Respiratory: Bilateral chest rise   No audible wheezing found  Cardiovascular: Regular Rate and Rhythm  Abdomen: soft nontender/nondistended  Musculoskeletal: left lower extremity  · No erythema  · No ecchymosis   · No edema  · Sensation intact to light touch   · 5/5 strength to hip flexion/extension, knee flexion/extension, ankle dorsi/plantar flexion, EHL/FHL  · 2+ DP pulse    _*_*_*_*_*_*_*_*_*_*_*_*_*_*_*_*_*_*_*_*_*_*_*_*_*_*_*_*_*_*_*_*_*_*_*_*_*_*_*_*_*    Assessment:  47 y o  female with left knee OA and left medial meniscal tear  Plan:   · NPO  · To OR for Left knee arthroscopy with Dr Camilla Aviles today  · Pain control as needed  · PT as outpatient after surgery  · Expected discharge after recovery later today  Follow up in the office as scheduled       Marco A Hernandez MD

## 2020-02-12 NOTE — ANESTHESIA POSTPROCEDURE EVALUATION
Post-Op Assessment Note    CV Status:  Stable  Pain Score: 0    Pain management: adequate     Mental Status:  Alert and awake   Hydration Status:  Euvolemic   PONV Controlled:  Controlled   Airway Patency:  Patent   Post Op Vitals Reviewed: Yes      Staff: CRNA           BP   103/74   Temp   96 8   Pulse  88   Resp   17   SpO2   100%

## 2020-02-12 NOTE — INTERVAL H&P NOTE
H&P reviewed  After examining the patient I find no changes in the patients condition since the H&P had been written  /69   Pulse 82   Temp 99 9 °F (37 7 °C) (Tympanic)   Resp 20   Ht 5' 2" (1 575 m)   Wt 85 3 kg (188 lb)   SpO2 100%   BMI 34 39 kg/m²       Vitals:    02/12/20 1117   BP: 129/69   Pulse: 82   Resp: 20   Temp: 99 9 °F (37 7 °C)   SpO2: 100%

## 2020-02-12 NOTE — DISCHARGE INSTRUCTIONS
Discharge Instructions - 90 [facility] 47 y o  female MRN: [id number]  Unit/Bed#: PACU 02    Weight Bearing Status:                                           Weight bearing as tolerated to left lower extremity    Pain:  Continue analgesics as directed    Dressing Instructions:   Keep dressing clean, dry and intact until follow up appointment  Do not shower until dressings removed    PT/OT:  Continue PT/OT on outpatient basis as directed    Appt Instructions: If you do not have your appointment, please call the clinic at [phone] to f/u with Dr Velasquez Luna in 2 weeks  Otherwise followup as scheduled       Lesión de ligamento libby anterior   LO QUE NECESITA SABER:   Laminirene Dejan de ligamento libby anterior (LCA) es un desgarre parcial o total del LCA  El LCA es un ligamento en castro rodilla que conecta la tibia (hueso de la espinilla) al fémur (hueso del muslo)  Los ligamentos son los tejidos nae que unen a los [place]  El LCA impide que se deslice la tibia demasiado hacia mackenzie y mantiene la estabilidad de la rodilla  INSTRUCCIONES SOBRE [facility]:   Busque atención médica de inmediato si:   · Los dedos de roxy pies se sienten fríos o entumecidos  · Castro rodilla se siente más débil o inestable  · Castro dolor ha aumentado, aun después de vincent tomado el medicamento para el dolor  · Castro inflamación ha aumentado  Pregúntele a castro Yesenia Hefty vitaminas y minerales son adecuados para usted  · Usted tiene fiebre  · Roxy síntomas no están mejorando  · Usted tiene preguntas o inquietudes acerca de castro condición o cuidado  Medicamentos:  Es posible que usted necesite alguno de los siguientes:  · AINEs (Analgésicos antiinflamatorios no esteroides) vick el ibuprofeno, ayudan a disminuir la inflamación, el dolor y la fiebre  Mary medicamento esta disponible con o sin reece receta médica  Los AINEs pueden causar sangrado estomacal o problemas renales en ciertas personas   Si usted kwan un medicamento anticoagulante, siempre pregúntele a castro médico si los DANNIELLE son seguros para usted  Siempre lauren la etiqueta de coleman medicamento y [place] instrucciones  · Acetaminofeno:  charly el dolor y baja la fiebre  Está disponible sin receta médica  Pregunte la cantidad y la frecuencia con que debe tomarlos  [address]  Lauren las etiquetas de todos los demás medicamentos que esté usando para saber si también contienen acetaminofén, o pregunte a castro médico o farmacéutico  El acetaminofén puede causar daño en el hígado cuando no se kwan de forma correcta  No use más de 4 gramos (4000 miligramos) en total de acetaminofeno en un día  · Un medicamento con receta para el dolor  podrían ser Hallie Camarillo  Pregunte al médico cómo debe davonte coleman medicamento de forma new  Algunos medicamentos recetados para el dolor contienen acetaminofén  No tome otros medicamentos que contengan acetaminofén sin consultarlo con castro médico  Demasiado acetaminofeno puede causar daño al hígado  Los medicamentos recetados para el dolor podrían causar estreñimiento  Pregunte a castro médico vick prevenir o tratar estreñimiento  · [place] kimmie medicamentos vick se le haya indicado  Consulte con castro médico si usted froylan que castro medicamento no le está ayudando o si presenta efectos secundarios  Infórmele si es alérgico a cualquier medicamento  Mantenga reece lista actualizada de los [place], las vitaminas y los productos herbales que kwan  Incluya los siguientes datos de los medicamentos: cantidad, frecuencia y motivo de administración  Traiga con usted la lista o los envases de la píldoras a kimmie citas de seguimiento  Lleve la lista de los medicamentos con usted en олег de reece emergencia  El manejo de castro lesión del LCA:   · Descanse  castro articulación para que pueda sanar  Regrese a kimmie actividades cotidianas según las indicaciones  Puede que no sea capaz de jugar algunos deportes hasta que castro rodilla sane   Hable con castro médico sobre los deportes que juega actualmente  Puede que necesite hacer un plan de seguridad para comenzar a jugar el deporte otra vez  · El hielo  ayuda a disminuir la inflamación y el dolor  El hielo también puede contribuir a evitar el daño de los tejidos  Use un paquete de hielo o ponga hielo molido dentro de [employer]  Cubra el empaque de hielo con reece toalla y colóquelo en anderson ligamento lesionado por 15 a 21 minutos cada hora  Use el hielo por el tiempo que se lo indicaron  · La compresión  provee soporte y ayuda a disminuir la inflamación y a inmovilizar la articulación para que pueda sanar  Pregúntele a anderson médico si usted debe de envolver reece venda elástica alrededor de anderson ligamento lesionado  · Eleve  el área lesionada por encima del nivel de anderson corazón lo más frecuentemente que pueda  [place] ayudará a bajar o limitar la hinchazón  Para elevar el área lesionada cómodamente apóyela sobre almohadas  · Use aparatos ortopédicos,  según las indicaciones  Puede utilizarse reece rodillera para limitar el movimiento y proteger la rodilla de anderson hijo  Es posible que anderson hijo deba usar muletas para disminuir el dolor al desplazarse  · Vaya a terapia física  si se requiriera  Podría usarse terapia física para enseñarle a anderson hijo los ejercicios para mejorar el movimiento y la fortaleza, y para disminuir el dolor  Los ejercicios también pueden ayudar a aumentar el rango de movimiento en la rodilla de anderson hijo  Acuda a kimmie consultas de control con anderson médico según le indicaron  Anote kimmie preguntas para que se acuerde de hacerlas bradley kimmie visitas  © 2017 [address] Information is for End User's use only and may not be sold, redistributed or otherwise used for commercial purposes  All illustrations and images included in CareNotes® are the copyrighted property of A ANITHA A M , Inc  or Danie Puri  Esta información es sólo para uso en educación   [place] intención no es darle un consejo médico sobre enfermedades o tratamientos  Colsulte con castro Art Ping farmacéutico antes de seguir cualquier régimen médico para saber si es seguro y efectivo para usted  Lesión de ligamento libby anterior   LO QUE NECESITA SABER:   ¿Qué es reece lesión de ligamento libby anterior (LCA)? [place] del LCA es un desgarre parcial o total del LCA  El LCA es un ligamento en castro rodilla que conecta la tibia (hueso de la espinilla) al fémur (hueso del muslo)  Los ligamentos son los tejidos nae que unen a los [place]  El LCA impide que se deslice la tibia demasiado hacia mackenzie y mantiene la estabilidad de la rodilla  ¿Qué causa reece lesión en el ligamento libby anterior? Un trauma vick un accidente de tránsito o reece caída puede causar un desgarre en el LCA  [place] del LCA puede suceder cuando la parte de afuera o de adentro de la rodilla sufre un golpe tevin  [place] sucede con frecuencia en deportes de contacto, vick el fútbol americano, baloncesto y hockey  También puede lesionar el LCA  [place] puede suceder si usted tuerce la rodilla mientras está parado  También puede extender demasiado la rodilla  Puede sufrir lesiones si usted de repente para o cambia de dirección mientras está corriendo  ¿Cuáles son los signos y síntomas de Josefa Snuffer lesión del LCA? · Un chasquido o desgarre cuando se lesiona el LCA    · Inflamación repentina o dolor en castro rodilla    · La rodilla se da a ceder    · Un cambio en castro manera de caminar, vick piernas tiesas    · Dificultad para sobreponer peso en la pierna o enderezar la rodilla  ¿Cómo se diagnostica reece lesión del LCA? Los médicos pueden examinar la función de castro LCA al  castro rodilla, pierna o pie en diferentes direcciones  Puede que se le pida que se apoye o brinque usando la pierna con la rodilla lesionada  Dígale al médico si usted siente dolor mientras hace estas u [place]   Ambas rodillas pueden ser examinadas para cualquier movimiento anormal  Es posible que usted necesite los siguientes exámenes:  · Unos debi X o resonancia magnética  pueden usarse para buscar un desgarro en el LCA  A usted le podrían administrar un medio de contraste para que castro rodilla se pueda observar mejor en las imágenes  Informe a castro médico si alguna vez ha tenido reece reacción alérgica al medio de [place]  No entre a la dena donde se realiza la resonancia magnética con algo de metal  El metal puede causar lesiones serias  Dígale al médico si usted tiene algo de metal por dentro o sobre castro cuerpo  · [place] artroscopia  es un procedimiento usado para observar dentro de castro rodilla en busca de reece lesión del LCA  [place] incisión se hace en castro rodilla para insertar un tubo visor  Coleman dispositivo es un tubo kaylen equipado con Lyn Samples y Gerson Raker en el extremo  ¿Cómo se trata reece lesión del LCA? · AINEs (Analgésicos antiinflamatorios no esteroides) vick el ibuprofeno, ayudan a disminuir la inflamación, el dolor y la [place]  [place] medicamento esta disponible con o sin reece receta médica  Los AINEs pueden causar sangrado estomacal o problemas renales en ciertas personas  Si usted kwan un medicamento anticoagulante, siempre pregúntele a castro médico si los DANNIELLE son seguros para usted  Siempre lauren la etiqueta de coleman medicamento y [place] instrucciones  · Acetaminofeno:  charly el dolor y baja la fiebre  Está disponible sin receta médica  Pregunte la cantidad y la frecuencia con que debe tomarlos  [address]  Lauren las etiquetas de todos los demás medicamentos que esté usando para saber si también contienen acetaminofén, o pregunte a castro médico o farmacéutico  El acetaminofén puede causar daño en el hígado cuando no se kwan de forma correcta  No use más de 4 gramos (4000 miligramos) en total de acetaminofeno en un día  · Un medicamento con receta para el dolor  podrían ser Hammad Francisco  Pregunte al médico cómo debe davonte coleman medicamento de forma new  Algunos medicamentos recetados para el dolor contienen acetaminofén  No tome otros medicamentos que contengan acetaminofén sin consultarlo con castro médico  Demasiado acetaminofeno puede causar daño al hígado  Los medicamentos recetados para el dolor podrían causar estreñimiento  Pregunte a castro médico vick prevenir o tratar estreñimiento  · [place]  puede ser necesaria si usted tiene un desgarro en el LCA o daños en los otros ligamentos de la rodilla  ¿Cómo puedo manejar mi lesión de ligamento libby anterior? · Descanse  castro articulación para que pueda sanar  Regrese a kimmie actividades cotidianas según las indicaciones  Puede que no sea capaz de jugar algunos deportes hasta que castro rodilla sane  Hable con castro médico sobre los deportes que juega actualmente  Puede que necesite hacer un plan de seguridad para comenzar a jugar el deporte otra vez  · El hielo  ayuda a disminuir la inflamación y el dolor  El hielo también puede contribuir a evitar el daño de los tejidos  Use un paquete de hielo o ponga hielo molido dentro de [employer]  Cubra el empaque de hielo con reece toalla y colóquelo en castro ligamento lesionado por 15 a 21 minutos cada hora  Use el hielo por el tiempo que se lo indicaron  · La compresión  provee soporte y ayuda a disminuir la inflamación y a inmovilizar la articulación para que pueda sanar  Pregúntele a castro médico si usted debe de envolver reece venda elástica alrededor de castro ligamento lesionado  · Eleve  el área lesionada por encima del nivel de castro corazón lo más frecuentemente que pueda  [place] ayudará a bajar o limitar la hinchazón  Para elevar el área lesionada cómodamente apóyela sobre almohadas  · Use aparatos ortopédicos,  según las indicaciones  Puede utilizarse reece rodillera para limitar el movimiento y proteger la rodilla de castro hijo  Es posible que castro hijo deba usar muletas para disminuir el dolor al desplazarse  · Vaya a terapia física  si se requiriera   Jessie Levy terapia física para enseñarle a potter hijo los ejercicios para mejorar el movimiento y la fortaleza, y para disminuir el dolor  Los ejercicios también pueden ayudar a aumentar el rango de movimiento en la rodilla de potter hijo  ¿Cuándo kiko buscar atención inmediata? · Los dedos de kimmie pies se sienten fríos o entumecidos  · [place] rodilla se siente más débil o inestable  · [place] dolor ha aumentado o regresado, aún después de davonte el medicamento para el dolor  · [place] inflamación ha aumentado o regresado  · Kimmie síntomas no están mejorando  ¿Cuándo kiko comunicarme con mi médico?   · Usted tiene fiebre  · Usted tiene preguntas o inquietudes acerca de potter condición o cuidado  ACUERDOS SOBRE POTTER CUIDADO:   Usted tiene el derecho de ayudar a planear potter cuidado  Aprenda todo lo que pueda sobre potter condición y vick darle tratamiento  Discuta kimmie opciones de tratamiento con kimmie médicos para decidir el cuidado que usted desea recibir  Usted siempre tiene el derecho de rechazar el tratamiento  Esta información es sólo para uso en educación  [place] intención no es darle un consejo médico sobre enfermedades o tratamientos  Colsulte con potter [place] [facility] farmacéutico antes de seguir cualquier régimen médico para saber si es seguro y efectivo para usted  © 2017 [address] Information is for End User's use only and may not be sold, redistributed or otherwise used for commercial purposes  All illustrations and images included in CareNotes® are the copyrighted property of A D A M , Inc  or Danie Puri

## 2020-02-12 NOTE — OP NOTE
OPERATIVE REPORT  PATIENT NAME: Jake Aguillon    :  1970  MRN: [id number]  Pt Location: BE OR ROOM 18    SURGERY DATE: 2020    Surgeon(s) and Role:     * Chung Miller MD - Primary     * Cassy Calhoun MD - Assisting     * Ana Stern PA-C - Observing    Preop Diagnosis:  Complex tear of medial meniscus of left knee as current injury, subsequent encounter [S83 232D]    Post-Op Diagnosis Codes:     * Complex tear of medial meniscus of left knee as current injury, subsequent encounter [S83 232D]    Procedure(s) (LRB):  ARTHROSCOPY KNEE partial medial menisectomy (Left)    Specimen(s):  * No specimens in log *    Estimated Blood Loss:   10 mL    Drains:  * No LDAs found *    Anesthesia Type:   General    Operative Indications:  Complex tear of medial meniscus of left knee as current injury, subsequent encounter [S83 232D]  With instability of left knee    Operative Findings:  Complex tear posterior horn medial meniscus and partial tear of anterior cruciate ligament with positive Lachman's test    Complications:   None    Procedure and Technique:  Surgical arthroscopy thermal shrinkage left anterior cruciate ligament  Patient complained of of inflamed left knee and instability  She went through course of conservative treatment including cortisone injections and physical therapy  This did not resolve her symptoms  She had a positive Lachman's test preoperatively but the MRI scan of her left knee seem to reveal a stable anterior cruciate ligament  Patient was brought the operating room for surgical arthroscopy possible partial posterior medial meniscectomy and evaluation of the left anterior cruciate ligament  Patient was administered general anesthetic with LMA and then prepped and draped in usual sterile fashion for an operation on left knee    Lateral medial portals were established suprapatella pouch was inspected there was significant synovitis in the region the patellofemoral joint was intact by visual inspection and probe inspection the medial compartment was inspected femoral condyle and tibial plateau were intact the medial meniscus was intact for the majority of its length until of the region of the mid posterior horn  There was some elements of a complex tear posteriorly but it was not creating any mechanical issues  There was no meniscal synovial junction looseness in the medial compartment  Attention was then drawn to the intercondylar notch the anterior cruciate ligament was visualized it did seem somewhat redundant  There was still maintained attachment in the femoral notch  There was a partial tear and derangement of the body of the ACL  There was a strong insertion into the tibial plateau  Under probe inspection this was grossly loose  Locked in test was +2 positive with the patient asleep  The lateral compartment was inspected femoral condyle and tibial plateau were intact revision spec shin probe inspection the lateral meniscus was intact for its entire length by visual inspection and probe inspection attention was then drawn the intercondylar notch the heat probe was used to ablate some of the hypertrophied synovator is in the region  This case in which better look at the anterior crucial ligament  Once again nerve hook was used to probe the anterior cruciate ligament it was still attached in the femoral notch but there was a significant excursion  Under direct visual inspection the lachman test was grossly positive  The heat probe was used on a very low setting on the anterior 2/3 of the ligament  This was continued until the ACL felt strong by probe inspection and the Lachman's test was negative  There was good medial-lateral stability as well    The wound was thoroughly irrigated with sterile saline solution 10 mg of Duramorph and 10 cc of 0 5% Marcaine with epinephrine was injected into the knee for post operative pain relief the portals were closed using 2 0 nylon in a horizontal mattress fashion of Xeroform 4x4s ABDs were applied a sterile Webril and Ace wrap was applied  The patient tolerated the procedure well and left the operating good condition     I was present for the entire procedure    Patient Disposition:  PACU     SIGNATURE: Aneta Ramirez MD  DATE: February 12, 2020  TIME: 2:28 PM

## 2020-02-14 ENCOUNTER — TELEPHONE (OUTPATIENT)
Dept: OBGYN CLINIC | Facility: CLINIC | Age: 50
End: 2020-02-14

## 2020-02-14 NOTE — TELEPHONE ENCOUNTER
Dr Maura Delgadillo called to let the office know that [facility] will be contacting you to get information to approve her disability request   She is giving permission to release that information however does not have access to a fax  Her email address is [email or web address] if you could email the form  Also, please mail her anything you send to [facility] [place]  Any questions please call her [phone]    Thank you

## 2020-02-18 NOTE — TELEPHONE ENCOUNTER
Patient is calling because she had her disability forms faxed to us & would like to know if we received it  Patient was given the fax number [phone]  & is stating that they just re-faxed it to this fax number about half hour ago  They were also faxed last week  I do not see any information on the log     [phone]

## 2020-02-19 ENCOUNTER — TELEPHONE (OUTPATIENT)
Dept: OBGYN CLINIC | Facility: HOSPITAL | Age: 50
End: 2020-02-19

## 2020-02-19 NOTE — TELEPHONE ENCOUNTER
Caller: Jeremy  Provider:Erick  Call back:  Reason for call:checking on status/receipt of disability paperwork    I checked the disability log and noted the forms were faxed this morning

## 2020-02-19 NOTE — TELEPHONE ENCOUNTER
I spoke with Jorge Corcoran to confirm I completed her disability paperwork and it has been faxed to [facility]

## 2020-02-20 ENCOUNTER — EVALUATION (OUTPATIENT)
Dept: PHYSICAL THERAPY | Age: 50
End: 2020-02-20
Payer: COMMERCIAL

## 2020-02-20 DIAGNOSIS — M23.8X2: Primary | ICD-10-CM

## 2020-02-20 PROCEDURE — 97110 THERAPEUTIC EXERCISES: CPT | Performed by: PHYSICAL THERAPIST

## 2020-02-20 PROCEDURE — 97161 PT EVAL LOW COMPLEX 20 MIN: CPT | Performed by: PHYSICAL THERAPIST

## 2020-02-20 NOTE — PROGRESS NOTES
PT Evaluation     Today's date: 2020  Patient name: Thor Mendoza  : 1970  MRN: [id number]  Referring provider: Gita Ulloa PA-C  Dx:   Encounter Diagnosis     ICD-10-CM    1  Laxity of anterior cruciate ligament, left M23 8X2        Start Time: 1155  Stop Time: 1240  Total time in clinic (min): 45 minutes    Assessment  Assessment details: Pt reports to PT s/p surgery on L knee to repair laxity of ACL  Pt presents without AD or brace  Pt is significantly limited in L knee flexion due to pain and avoids knee flexion when seated  Pt will benefit from skilled PT, with following of ACL protocol per MD, to improve LE ROM and strength deficits and increase functional mobility with ADLs  Impairments: abnormal coordination, abnormal gait, abnormal muscle tone, abnormal or restricted ROM, abnormal movement, activity intolerance, impaired balance, impaired physical strength, lacks appropriate home exercise program, pain with function and poor body mechanics    Goals  In 4 weeks pt will:  -Be independent with phase I of HEP  -Increase LE ROM by 20 degrees    By discharge pt will:  -Be independent with Phase II of HEP  -Demonstrate full ROM of L knee  -Demonstrate 5/5 B LE strength  -Report minimal difficulty with ADLs      Plan  Planned modality interventions: cryotherapy, TENS and thermotherapy: hydrocollator packs  Planned therapy interventions: joint mobilization, manual therapy, neuromuscular re-education, stretching, strengthening, therapeutic activities, therapeutic exercise, gait training, functional ROM exercises and home exercise program  Frequency: 2x week  Duration in weeks: 8  Plan of Care beginning date: 2020  Plan of Care expiration date: 2020  Treatment plan discussed with: patient        Subjective Evaluation    History of Present Illness  Mechanism of injury: Pt reports to PT s/p surgery of L knee with goal of decreased L ACL laxity   Heat probe was used in attempt to decrease ACL laxity, but no reconstruction was done  Pt reports pain that increases with knee flexion     Pain  Current pain rating: 3  At best pain rating: 3  At worst pain ratin    Patient Goals  Patient goals for therapy: decreased pain, improved balance, increased motion, increased strength, independence with ADLs/IADLs, return to sport/leisure activities and return to work          Objective     Passive Range of Motion     Additional Passive Range of Motion Details  5-80 before pain in L knee    Strength/Myotome Testing     Left Hip   Planes of Motion   Abduction: 4  External rotation: 4    Right Hip   Planes of Motion   Abduction: 4  External rotation: 4    Left Knee   Flexion: 4  Extension: 4      Flowsheet Rows      Most Recent Value   PT/OT G-Codes   Current Score  36   Projected Score  58             Precautions: N/A      Manual                                                                                   Exercise Diary              Heel slides x10            SAQ x20            Prone hang 5'                                                                                                                                                                                                                                             Modalities

## 2020-02-25 ENCOUNTER — OFFICE VISIT (OUTPATIENT)
Dept: OBGYN CLINIC | Facility: HOSPITAL | Age: 50
End: 2020-02-25

## 2020-02-25 VITALS
DIASTOLIC BLOOD PRESSURE: 94 MMHG | HEART RATE: 91 BPM | WEIGHT: 188 LBS | HEIGHT: 62 IN | BODY MASS INDEX: 34.6 KG/M2 | SYSTOLIC BLOOD PRESSURE: 157 MMHG

## 2020-02-25 DIAGNOSIS — Z98.890 S/P LEFT KNEE ARTHROSCOPY: Primary | ICD-10-CM

## 2020-02-25 PROCEDURE — 3008F BODY MASS INDEX DOCD: CPT | Performed by: ORTHOPAEDIC SURGERY

## 2020-02-25 PROCEDURE — 99024 POSTOP FOLLOW-UP VISIT: CPT | Performed by: ORTHOPAEDIC SURGERY

## 2020-02-25 NOTE — PROGRESS NOTES
47 y o female presents to the office status post left knee arthroscopy with tensioning of ACL on 02/12/2020  She reports doing well overall  She has gone to one physical therapy session thus far  She denies any numbness or tingling  She is accompanied by a male today in the office  She has no new complaints or concerns       Review of Systems  Review of systems negative unless otherwise specified in HPI    Past Medical History  Past Medical History:   Diagnosis Date    Arthritis     Bacterial vaginosis     Migraine     Obesity     Varicella        Past Surgical History  Past Surgical History:   Procedure Laterality Date    NO PAST SURGERIES      LA KNEE SCOPE,MED/LAT MENISECTOMY Left 2/12/2020    Procedure: ARTHROSCOPY KNEE partial medial menisectomy;  Surgeon: Rock Sergio MD;  Location: [facility];  Service: Orthopedics       Current Medications  Current Outpatient Medications on File Prior to Visit   Medication Sig Dispense Refill    Ascorbic Acid (VITAMIN C GUMMIE PO) Take 2 gums by mouth daily      Aspirin-Acetaminophen-Caffeine (EXCEDRIN PO) Take by mouth as needed      benzonatate (TESSALON PERLES) 100 mg capsule Take 1 capsule (100 mg total) by mouth 3 (three) times a day as needed for cough 30 capsule 0    Biotin 10 MG CAPS Take 1 capsule by mouth daily      COLLAGEN PO Take by mouth daily      Cyanocobalamin (VITAMIN B 12 PO) Take by mouth every other day      docusate sodium (COLACE) 100 mg capsule Take 1 capsule (100 mg total) by mouth 2 (two) times a day 10 capsule 0    menthol-cetylpyridinium (CEPACOL) 3 MG lozenge Take 1 lozenge (3 mg total) by mouth as needed for sore throat 30 tablet 0    Misc Natural Products (APPLE CIDER VINEGAR DIET PO) Take by mouth daily      Omega-3 1000 MG CAPS Take 1 capsule by mouth daily      oxyCODONE (ROXICODONE) 5 mg immediate release tablet Take 1 tablets PO q 4 hours PRN Pain 10 tablet 0    VITAMIN A PO Take by mouth every other day      naproxen (NAPROSYN) 500 mg tablet Take 1 tablet (500 mg total) by mouth 2 (two) times a day with meals for 120 days (Patient taking differently: Take 500 mg by mouth as needed ) 60 tablet 3     No current facility-administered medications on file prior to visit  Recent Labs [facility])  0   Lab Value Date/Time    HCT 49 0 (H) 12/28/2019 0944    HGB 15 2 12/28/2019 0944    WBC 8 35 12/28/2019 0944    INR 1 04 12/28/2019 0944    ESR 13 08/17/2019 1043    CRP 8 7 (H) 07/11/2019 1134         Physical exam  · General: Awake, Alert, Oriented  · Eyes: Pupils equal, round and reactive to light  · Heart: regular rate and rhythm  · Lungs: No audible wheezing  · Abdomen: soft  Left knee  · Patient ambulates without assistance  · Incisions are clean, dry and intact  · ROM is limited  · Skin is warm and well perfused    Assessment/Plan:   47 y  o female is status post left knee arthroscopy with ACL tensioning  She was encouraged to continue physical therapy 3 times per week  We will see her back in 4 weeks  Discuss return to work at that time       Scribe Attestation    I,:   Trudi Jefferson am acting as a scribe while in the presence of the attending physician :        I,:   Leticia Howard MD personally performed the services described in this documentation    as scribed in my presence :

## 2020-02-26 ENCOUNTER — OFFICE VISIT (OUTPATIENT)
Dept: PHYSICAL THERAPY | Age: 50
End: 2020-02-26
Payer: COMMERCIAL

## 2020-02-26 DIAGNOSIS — M23.8X2: Primary | ICD-10-CM

## 2020-02-26 PROCEDURE — 97140 MANUAL THERAPY 1/> REGIONS: CPT | Performed by: PHYSICAL THERAPIST

## 2020-02-26 PROCEDURE — 97110 THERAPEUTIC EXERCISES: CPT | Performed by: PHYSICAL THERAPIST

## 2020-02-26 NOTE — PROGRESS NOTES
Daily Note     Today's date: 2020  Patient name: Faiza Rhodes  : 1970  MRN: [id number]  Referring provider: Bud Haji PA-C  Dx:   Encounter Diagnosis     ICD-10-CM    1  Laxity of anterior cruciate ligament, left M23 8X2                   Subjective: Pt reports anterior L knee pain      Objective: See treatment diary below      Assessment: Tolerated treatment well  Patient demonstrated fatigue post treatment, would benefit from continued PT and pt has adequet active and passive knee extension at this time, with biggest limitation in knee flexion  Plan: Continue per plan of care  Progress treatment as tolerated         Precautions: N/A      Manual              PROM  ROBBINS                                                                   Exercise Diary             Heel slides x10 x30           SAQ x20 3x10           Prone hang 5' 5'           SLR  3x10           Mini squats  x20                                                                                                                                                                                                                  Modalities              CP  10'

## 2020-02-28 ENCOUNTER — OFFICE VISIT (OUTPATIENT)
Dept: PHYSICAL THERAPY | Age: 50
End: 2020-02-28
Payer: COMMERCIAL

## 2020-02-28 DIAGNOSIS — M23.8X2: Primary | ICD-10-CM

## 2020-02-28 PROCEDURE — 97110 THERAPEUTIC EXERCISES: CPT | Performed by: PHYSICAL THERAPIST

## 2020-02-28 PROCEDURE — 97140 MANUAL THERAPY 1/> REGIONS: CPT | Performed by: PHYSICAL THERAPIST

## 2020-02-28 PROCEDURE — 97112 NEUROMUSCULAR REEDUCATION: CPT | Performed by: PHYSICAL THERAPIST

## 2020-02-28 NOTE — PROGRESS NOTES
Daily Note     Today's date: 2020  Patient name: Brian Ahuja  : 1970  MRN: [id number]  Referring provider: Anabel Morris PA-C  Dx:   Encounter Diagnosis     ICD-10-CM    1  Laxity of anterior cruciate ligament, left M23 8X2                   Subjective: Pt reports continued anterior L knee pain      Objective: See treatment diary below      Assessment: Tolerated treatment well  Patient demonstrated fatigue post treatment, would benefit from continued PT and continues to struggle with flexion, but has increased ROM at end of session      Plan: Continue per plan of care  Progress treatment as tolerated         Precautions: N/A      Manual             PROM  ROSENDO ROBBINS                                                                  Exercise Diary            Heel slides x10 x30 x30          SAQ x20 3x10 3x10 ea, 3# on R          Prone hang 5' 5' 5'          SLR-flex  3x10 3x10 flex/abd          Mini squats  x20 x20                                                                                                                                                                                                                 Modalities             CP  10' 10'

## 2020-03-02 ENCOUNTER — OFFICE VISIT (OUTPATIENT)
Dept: PHYSICAL THERAPY | Age: 50
End: 2020-03-02
Payer: COMMERCIAL

## 2020-03-02 DIAGNOSIS — M23.8X2: Primary | ICD-10-CM

## 2020-03-02 PROCEDURE — 97110 THERAPEUTIC EXERCISES: CPT | Performed by: PHYSICAL THERAPIST

## 2020-03-02 NOTE — PROGRESS NOTES
Daily Note     Today's date: 3/2/2020  Patient name: Sherryle Basset  : 1970  MRN: [id number]  Referring provider: Nadia Lane PA-C  Dx:   Encounter Diagnosis     ICD-10-CM    1  Laxity of anterior cruciate ligament, left M23 8X2                   Subjective: Pt reports some soreness       Objective: See treatment diary below      Assessment: Tolerated treatment well  Patient demonstrated fatigue post treatment, would benefit from continued PT and pt demonstrated increased knee flexion during exercises  Plan: Continue per plan of care  Progress treatment as tolerated         Precautions: N/A      Manual   2/26 2/28 3/2         PROM  ROBBINS ROSENDO ROBBINS                                                                 Exercise Diary  2/20 2/26 2/28 3/2         Heel slides x10 x30 x30 x30         LAQ x20 3x10 3x10 ea, 3# on R 3x10 ea, 3# on R         Prone hang 5' 5' 5'          SLR-flex  3x10 3x10 flex/abd 3x10 flex/abd         Mini squats  x20 x20 x20         HR    x30                                                                                                                                                                                                   Modalities   2/26 2/28 3/2         CP  10' 10' 10'

## 2020-03-03 DIAGNOSIS — Z12.39 SCREENING FOR BREAST CANCER: Primary | ICD-10-CM

## 2020-03-04 ENCOUNTER — OFFICE VISIT (OUTPATIENT)
Dept: PHYSICAL THERAPY | Age: 50
End: 2020-03-04
Payer: COMMERCIAL

## 2020-03-04 DIAGNOSIS — M23.8X2: Primary | ICD-10-CM

## 2020-03-04 PROCEDURE — 97110 THERAPEUTIC EXERCISES: CPT | Performed by: PHYSICAL THERAPIST

## 2020-03-04 PROCEDURE — 97112 NEUROMUSCULAR REEDUCATION: CPT | Performed by: PHYSICAL THERAPIST

## 2020-03-04 NOTE — PROGRESS NOTES
Daily Note     Today's date: 3/4/2020  Patient name: Jake Aguillon  : 1970  MRN: [id number]  Referring provider: Yanet Zhang PA-C  Dx:   Encounter Diagnosis     ICD-10-CM    1  Laxity of anterior cruciate ligament, left M23 8X2                   Subjective: Pt reports pain yesterday      Objective: See treatment diary below      Assessment: Tolerated treatment well  Patient demonstrated fatigue post treatment, would benefit from continued PT and pt's knee flexion is progressing adequetly  Plan: Continue per plan of care  Progress treatment as tolerated         Precautions: N/A      Manual   2/26 2/28 3/2 3/4        PROM  ROSENDO ROBBINS np                                                                Exercise Diary  2/20 2/26 2/28 3/2 3/4        Heel slides x10 x30 x30 x30 x30        LAQ x20 3x10 3x10 ea, 3# on R 3x10 ea, 3# on R 3x10        Prone hang 5' 5' 5'  During ice        SLR-flex  3x10 3x10 flex/abd 3x10 flex/abd 3x10        Mini squats  x20 x20 x20 2x10        HR    x30 np        Bike     10'                                                                                                                                                                                     Modalities   2/26 2/28 3/2         CP  10' 10' 10'

## 2020-03-06 ENCOUNTER — OFFICE VISIT (OUTPATIENT)
Dept: PHYSICAL THERAPY | Age: 50
End: 2020-03-06
Payer: COMMERCIAL

## 2020-03-06 DIAGNOSIS — M23.8X2: Primary | ICD-10-CM

## 2020-03-06 PROCEDURE — 97112 NEUROMUSCULAR REEDUCATION: CPT | Performed by: PHYSICAL THERAPIST

## 2020-03-06 PROCEDURE — 97116 GAIT TRAINING THERAPY: CPT | Performed by: PHYSICAL THERAPIST

## 2020-03-06 PROCEDURE — 97110 THERAPEUTIC EXERCISES: CPT | Performed by: PHYSICAL THERAPIST

## 2020-03-06 NOTE — PROGRESS NOTES
Daily Note     Today's date: 3/6/2020  Patient name: Dale Nuñez  : 1970  MRN: [id number]  Referring provider: Siomara Collins PA-C  Dx:   Encounter Diagnosis     ICD-10-CM    1  Laxity of anterior cruciate ligament, left M23 8X2                   Subjective: Pt reports feeling like she walks with L knee bent      Objective: See treatment diary below      Assessment: Tolerated treatment well  Patient demonstrated fatigue post treatment, would benefit from continued PT and pt walks with knee flexion throughout gait cycle, but does not lack knee extension  This is likely due to compensation prior to surgery  Plan: Continue per plan of care  Progress treatment as tolerated         Precautions: N/A      Manual   2/26 2/28 3/2 3/4        PROM  ROBBNIS ROBBINS ROBBINS np                                                                Exercise Diary  2/20 2/26 2/28 3/2 3/4 3/6       Heel slides x10 x30 x30 x30 x30 x30       LAQ x20 3x10 3x10 ea, 3# on R 3x10 ea, 3# on R 3x10 np       Prone hang 5' 5' 5'  During ice 4# 5'       SLR-flex  3x10 3x10 flex/abd 3x10 flex/abd 3x10 np       Mini squats  x20 x20 x20 2x10 np       HR    x30 np x30       Bike     10' 10'       Step ups/up/over      2' ea       Walking, focus on TKE      300'                                                                                                                                                          Modalities   2/26 2/28 3/2 3/6        CP  10' 10' 10' 10'

## 2020-03-09 ENCOUNTER — OFFICE VISIT (OUTPATIENT)
Dept: PHYSICAL THERAPY | Age: 50
End: 2020-03-09
Payer: COMMERCIAL

## 2020-03-09 DIAGNOSIS — M23.8X2: Primary | ICD-10-CM

## 2020-03-09 PROCEDURE — 97116 GAIT TRAINING THERAPY: CPT | Performed by: PHYSICAL THERAPIST

## 2020-03-09 PROCEDURE — 97110 THERAPEUTIC EXERCISES: CPT | Performed by: PHYSICAL THERAPIST

## 2020-03-09 PROCEDURE — 97112 NEUROMUSCULAR REEDUCATION: CPT | Performed by: PHYSICAL THERAPIST

## 2020-03-09 NOTE — PROGRESS NOTES
Daily Note     Today's date: 3/9/2020  Patient name: Suni Evans  : 1970  MRN: [id number]  Referring provider: Jeevan He PA-C  Dx:   Encounter Diagnosis     ICD-10-CM    1  Laxity of anterior cruciate ligament, left M23 8X2                   Subjective: Pt reports minimal changes       Objective: See treatment diary below      Assessment: Tolerated treatment well  Patient demonstrated fatigue post treatment, would benefit from continued PT and pt will undergo aquatic therapy 4x over next two weeks in attempt to increase activity tolerance  Plan: Continue per plan of care  Progress treatment as tolerated         Precautions: N/A      Manual   2/26 2/28 3/2 3/4        PROM  ROBBINS ROBBINS ROBBINS np                                                                Exercise Diary  2/20 2/26 2/28 3/2 3/4 3/6 3/9      Heel slides x10 x30 x30 x30 x30 x30 x30      LAQ x20 3x10 3x10 ea, 3# on R 3x10 ea, 3# on R 3x10 np 3x10 ea 4# R, 1 5# L      Prone hang 5' 5' 5'  During ice 4# 5' 4# 5'      SLR-flex  3x10 3x10 flex/abd 3x10 flex/abd 3x10 np np      Mini squats  x20 x20 x20 2x10 np np      HR    x30 np x30 np      Bike     10' 10' 15'      Step ups/up/over      2' ea np      Walking, focus on TKE      300' 500'                                Pool:             ROBBINS/CD laps             HS stretch             Seated knee ext             Mini squats             Hip 4 way             Trial quad stretch             Pool pony                              Modalities   2/26 2/28 3/2 3/6        CP  10' 10' 10' 10'

## 2020-03-11 ENCOUNTER — OFFICE VISIT (OUTPATIENT)
Dept: PHYSICAL THERAPY | Age: 50
End: 2020-03-11
Payer: COMMERCIAL

## 2020-03-11 DIAGNOSIS — M23.8X2: Primary | ICD-10-CM

## 2020-03-11 PROCEDURE — 97113 AQUATIC THERAPY/EXERCISES: CPT

## 2020-03-11 NOTE — PROGRESS NOTES
Daily Note     Today's date: 3/11/2020  Patient name: Gurjit Meza  : 1970  MRN: [id number]  Referring provider: Pablo Portillo PA-C  Dx:   Encounter Diagnosis     ICD-10-CM    1  Laxity of anterior cruciate ligament, left M23 8X2                   Subjective: Pt noted 4/10 pain today  Objective: See treatment diary below      Assessment: increased L knee pain after first aqua session  Plan: Continue per plan of care  Progress treatment as tolerated         Precautions: N/A      Manual   2/26 2/28 3/2 3/4        PROM  ROBBINS ROSENDO ROBBINS np                                                                Exercise Diary  2/20 2/26 2/28 3/2 3/4 3/6 3/9      Heel slides x10 x30 x30 x30 x30 x30 x30      LAQ x20 3x10 3x10 ea, 3# on R 3x10 ea, 3# on R 3x10 np 3x10 ea 4# R, 1 5# L      Prone hang 5' 5' 5'  During ice 4# 5' 4# 5'      SLR-flex  3x10 3x10 flex/abd 3x10 flex/abd 3x10 np np      Mini squats  x20 x20 x20 2x10 np np      HR    x30 np x30 np      Bike     10' 10' 15'      Step ups/up/over      2' ea np      Walking, focus on TKE      300' 500'              3/11                  Pool:             ROBBINS/CD laps        5/5x     HS stretch               Side stepping         6x     Seated knee ext        30x      Mini squats        20x      Hip 4 way        20x      Trial quad stretch        NT     Pool pony bike         5 min      HR        30x          Modalities   2/26 2/28 3/2 3/6        CP  10' 10' 10' 10'

## 2020-03-12 ENCOUNTER — APPOINTMENT (OUTPATIENT)
Dept: PHYSICAL THERAPY | Age: 50
End: 2020-03-12
Payer: COMMERCIAL

## 2020-03-13 ENCOUNTER — OFFICE VISIT (OUTPATIENT)
Dept: PHYSICAL THERAPY | Age: 50
End: 2020-03-13
Payer: COMMERCIAL

## 2020-03-13 DIAGNOSIS — M23.8X2: Primary | ICD-10-CM

## 2020-03-13 PROCEDURE — 97113 AQUATIC THERAPY/EXERCISES: CPT

## 2020-03-13 NOTE — PROGRESS NOTES
Daily Note     Today's date: 3/13/2020  Patient name: Gurjit Meza  : 1970  MRN: [id number]  Referring provider: Pablo Portillo PA-C  Dx:   Encounter Diagnosis     ICD-10-CM    1  Laxity of anterior cruciate ligament, left M23 8X2                   Subjective: Pt noted 3/10 pain today  Pt does like pool environment as that is best place to ex with decreased pain      Objective: See treatment diary below      Assessment:  Tolerated ex progression today without increased pain c/o's  Pt will benefit from further pool therapy to decrease pain and increase function with ADL's    Plan: Continue per plan of care  Progress treatment as tolerated         Precautions: N/A      Manual   2/26 2/28 3/2 3/4        PROM  ROSENDO ROBBINS np                                                                Exercise Diary  2/20 2/26 2/28 3/2 3/4 3/6 3/9      Heel slides x10 x30 x30 x30 x30 x30 x30      LAQ x20 3x10 3x10 ea, 3# on R 3x10 ea, 3# on R 3x10 np 3x10 ea 4# R, 1 5# L      Prone hang 5' 5' 5'  During ice 4# 5' 4# 5'      SLR-flex  3x10 3x10 flex/abd 3x10 flex/abd 3x10 np np      Mini squats  x20 x20 x20 2x10 np np      HR    x30 np x30 np      Bike     10' 10' 15'      Step ups/up/over      2' ea np      Walking, focus on TKE      300' 500'              3/11 3/13                 Pool:             ROBBINS/CD laps        5/5x 5/5    HS stretch           [id number]    Side stepping         6x 6x    Seated knee ext        30x  30x    Mini squats        20x  20x    Hip 4 way        20x  25x    Trial quad stretch        NT [id number]    SLS         10sx10    Pool pony bike         5 min  6min    HR        30x  30x        Modalities   2/26 2/28 3/2 3/6        CP  10' 10' 10' 10'

## 2020-03-16 ENCOUNTER — OFFICE VISIT (OUTPATIENT)
Dept: PHYSICAL THERAPY | Age: 50
End: 2020-03-16
Payer: COMMERCIAL

## 2020-03-16 DIAGNOSIS — M23.8X2: Primary | ICD-10-CM

## 2020-03-16 PROCEDURE — 97113 AQUATIC THERAPY/EXERCISES: CPT | Performed by: SPECIALIST/TECHNOLOGIST

## 2020-03-16 NOTE — PROGRESS NOTES
Daily Note     Today's date: 3/16/2020  Patient name: Zachariah Cole  : 1970  MRN: [id number]  Referring provider: Taylor Tavarez PA-C  Dx:   Encounter Diagnosis     ICD-10-CM    1  Laxity of anterior cruciate ligament, left M23 8X2                   Subjective: Pt reports some stiffness  Objective: See treatment diary below    Assessment: Pt able to increase repetitions with aquatic therex this visit in addition to added cuff wt resistance  Pt reports cuff wt resistance is appropriately challenging at this time and denies L knee discomfort  Tolerated treatment well  Patient demonstrated fatigue post treatment, exhibited good technique with therapeutic exercises and would benefit from continued PT    Plan: Continue per plan of care  Progress treatment as tolerated         Precautions: N/A      Manual   2/26 2/28 3/2 3/4        PROM  ROBBINS ROBBINS ROBBINS np                                                                Exercise Diary  2/20 2/26 2/28 3/2 3/4 3/6 3/9      Heel slides x10 x30 x30 x30 x30 x30 x30      LAQ x20 3x10 3x10 ea, 3# on R 3x10 ea, 3# on R 3x10 np 3x10 ea 4# R, 1 5# L      Prone hang 5' 5' 5'  During ice 4# 5' 4# 5'      SLR-flex  3x10 3x10 flex/abd 3x10 flex/abd 3x10 np np      Mini squats  x20 x20 x20 2x10 np np      HR    x30 np x30 np      Bike     10' 10' 15'      Step ups/up/over      2' ea np      Walking, focus on TKE      300' 500'              3/11 3/13 3/16                Pool:          [employer]   ROBBINS/SARBJIT laps        5/5x 5/5 5/5   HS stretch           [id number]   Side stepping         6x 6x 7x    Seated knee ext        30x  30x 30x   Mini squats        20x  20x 30x   Hip 4 way        20x  25x 30x    Trial quad stretch        NT [id number] 20sec x5   SLS         10sx10    Pool pony bike         5 min  6min 5 min   HR        30x  30x 30x        Modalities   2/26 2/28 3/2 3/6        CP  10' 10' 10' 10'

## 2020-03-18 ENCOUNTER — OFFICE VISIT (OUTPATIENT)
Dept: PHYSICAL THERAPY | Age: 50
End: 2020-03-18
Payer: COMMERCIAL

## 2020-03-18 DIAGNOSIS — M23.8X2: Primary | ICD-10-CM

## 2020-03-18 PROCEDURE — 97113 AQUATIC THERAPY/EXERCISES: CPT

## 2020-03-18 NOTE — PROGRESS NOTES
Daily Note     Today's date: 3/18/2020  Patient name: Sherryle Basset  : 1970  MRN: [id number]  Referring provider: Nadia Lane PA-C  Dx:   Encounter Diagnosis     ICD-10-CM    1  Laxity of anterior cruciate ligament, left M23 8X2                   Subjective: Pt noted 6/10 L knee pain today  Objective: See treatment diary below    Assessment: Decreased pain and improved mobility after today's aqua session  Pt continues to benefit from skilled PT     Plan: Continue per plan of care  Progress treatment as tolerated         Precautions: N/A      Manual   2/26 2/28 3/2 3/4        PROM  ROBBINS ROBBINS ROBBINS np                                                                Exercise Diary  2/20 2/26 2/28 3/2 3/4 3/6 3/9      Heel slides x10 x30 x30 x30 x30 x30 x30      LAQ x20 3x10 3x10 ea, 3# on R 3x10 ea, 3# on R 3x10 np 3x10 ea 4# R, 1 5# L      Prone hang 5' 5' 5'  During ice 4# 5' 4# 5'      SLR-flex  3x10 3x10 flex/abd 3x10 flex/abd 3x10 np np      Mini squats  x20 x20 x20 2x10 np np      HR    x30 np x30 np      Bike     10' 10' 15'      Step ups/up/over      2' ea np      Walking, focus on TKE      300' 500'                           3/18            Pool: Dark blue weights            ROBBINS/CD laps 5/5x              HS stretch 20sec 5x             Side stepping  7x             Seated knee ext NT             Mini squats 20x             Hip 4 way 30x              Self Quad stretch 20sec 5x             SLS B  10x 10sec             Pool pony bike  5 min             HR 30x                 Modalities   2/26 2/28 3/2 3/6        CP  10' 10' 10' 10'

## 2020-03-23 ENCOUNTER — OFFICE VISIT (OUTPATIENT)
Dept: PHYSICAL THERAPY | Age: 50
End: 2020-03-23
Payer: COMMERCIAL

## 2020-03-23 DIAGNOSIS — M23.8X2: Primary | ICD-10-CM

## 2020-03-23 PROCEDURE — 97110 THERAPEUTIC EXERCISES: CPT | Performed by: PHYSICAL THERAPIST

## 2020-03-23 NOTE — PROGRESS NOTES
Daily Note     Today's date: 3/23/2020  Patient name: Leopoldo Cong  : 1970  MRN: [id number]  Referring provider: Jg Gonzalez PA-C  Dx:   Encounter Diagnosis     ICD-10-CM    1  Laxity of anterior cruciate ligament, left M23 8X2                   Subjective: Pt reports she feels she can walk better      Objective: See treatment diary below      Assessment: Tolerated treatment well  Patient demonstrated fatigue post treatment, would benefit from continued PT and pt displays increased loading on LLE during ambulation  Pt has increased activity tolerance following aquatic sessions      Plan: Continue per plan of care  Progress treatment as tolerated         Precautions: N/A      Manual   2/26 2/28 3/2 3/4        PROM  ROBBINS ROBBINS ROBBINS np                                                                Exercise Diary  2/20 2/26 2/28 3/2 3/4 3/6 3/9 3/23     Heel slides x10 x30 x30 x30 x30 x30 x30 np     LAQ x20 3x10 3x10 ea, 3# on R 3x10 ea, 3# on R 3x10 np 3x10 ea 4# R, 1 5# L np     Prone hang 5' 5' 5'  During ice 4# 5' 4# 5' np     SLR-flex  3x10 3x10 flex/abd 3x10 flex/abd 3x10 np np      Mini squats  x20 x20 x20 2x10 np np np     HR    x30 np x30 np np     Bike     10' 10' 15' 15'     Step ups/up/over      2' ea np 2' ea     Walking, focus on TKE      300' 500' np       Leg press             60# 3x10     Hip abd        2 plates 6P70     Knee ext cybex        1 plate 4x5                                                                                                                                             Modalities   2/26 2/28 3/2 3/6        CP  10' 10' 10' 10'

## 2020-03-25 ENCOUNTER — OFFICE VISIT (OUTPATIENT)
Dept: PHYSICAL THERAPY | Age: 50
End: 2020-03-25
Payer: COMMERCIAL

## 2020-03-25 DIAGNOSIS — M23.8X2: Primary | ICD-10-CM

## 2020-03-25 PROCEDURE — 97110 THERAPEUTIC EXERCISES: CPT | Performed by: PHYSICAL THERAPIST

## 2020-03-25 NOTE — PROGRESS NOTES
Daily Note     Today's date: 3/25/2020  Patient name: Gurjit Meza  : 1970  MRN: [id number]  Referring provider: Pablo Portillo PA-C  Dx:   Encounter Diagnosis     ICD-10-CM    1  Laxity of anterior cruciate ligament, left M23 8X2                   Subjective: Pt reports minimal changes      Objective: See treatment diary below      Assessment: Tolerated treatment well  Patient demonstrated fatigue post treatment, would benefit from continued PT and was given extended/updated HEP in event of PT hiatus  Plan: Continue per plan of care  Progress treatment as tolerated         Precautions: N/A      Manual   2/26 2/28 3/2 3/4        PROM  ROBBINS ROBBINS ROBBINS np                                                                Exercise Diary  2/20 2/26 2/28 3/2 3/4 3/6 3/9 3/23 3/25    Heel slides x10 x30 x30 x30 x30 x30 x30 np dc    LAQ x20 3x10 3x10 ea, 3# on R 3x10 ea, 3# on R 3x10 np 3x10 ea 4# R, 1 5# L np dc    Prone hang 5' 5' 5'  During ice 4# 5' 4# 5' np np    SLR-flex  3x10 3x10 flex/abd 3x10 flex/abd 3x10 np np  np    Mini squats  x20 x20 x20 2x10 np np np np    HR    x30 np x30 np np np    Bike     8' 10' 15' 15' 15'    Step ups/up/over      2' ea np 2' ea 2' ea    Walking, focus on TKE      300' 500' np np      Leg press             60# 3x10  60# 3x10    Hip abd        2 plates 5M86 2 plates 4O11    Knee ext cybex        1 plate 4x5 1 plate 9S55                                                                                                                                            Modalities   2/26 2/28 3/2 3/6        CP  10' 10' 10' 10'

## 2020-03-31 ENCOUNTER — OFFICE VISIT (OUTPATIENT)
Dept: OBGYN CLINIC | Facility: HOSPITAL | Age: 50
End: 2020-03-31

## 2020-03-31 VITALS
WEIGHT: 194 LBS | HEIGHT: 62 IN | SYSTOLIC BLOOD PRESSURE: 145 MMHG | HEART RATE: 83 BPM | DIASTOLIC BLOOD PRESSURE: 84 MMHG | BODY MASS INDEX: 35.7 KG/M2

## 2020-03-31 DIAGNOSIS — Z47.89 AFTERCARE FOLLOWING SURGERY OF THE MUSCULOSKELETAL SYSTEM: Primary | ICD-10-CM

## 2020-03-31 PROCEDURE — 3008F BODY MASS INDEX DOCD: CPT | Performed by: PHYSICIAN ASSISTANT

## 2020-03-31 PROCEDURE — 99024 POSTOP FOLLOW-UP VISIT: CPT | Performed by: PHYSICIAN ASSISTANT

## 2020-04-01 ENCOUNTER — TELEPHONE (OUTPATIENT)
Dept: OBGYN CLINIC | Facility: HOSPITAL | Age: 50
End: 2020-04-01

## 2020-04-01 RX ORDER — CELECOXIB 50 MG/1
50 CAPSULE ORAL 2 TIMES DAILY
Qty: 60 CAPSULE | Refills: 0 | Status: SHIPPED | OUTPATIENT
Start: 2020-04-01 | End: 2021-05-28

## 2020-04-02 ENCOUNTER — OFFICE VISIT (OUTPATIENT)
Dept: PHYSICAL THERAPY | Age: 50
End: 2020-04-02
Payer: COMMERCIAL

## 2020-04-02 DIAGNOSIS — M23.8X2: Primary | ICD-10-CM

## 2020-04-02 PROCEDURE — 97164 PT RE-EVAL EST PLAN CARE: CPT | Performed by: PHYSICAL THERAPIST

## 2020-04-11 ENCOUNTER — OFFICE VISIT (OUTPATIENT)
Dept: URGENT CARE | Age: 50
End: 2020-04-11
Payer: COMMERCIAL

## 2020-04-11 VITALS
HEART RATE: 85 BPM | OXYGEN SATURATION: 96 % | TEMPERATURE: 97.4 F | BODY MASS INDEX: 35.7 KG/M2 | SYSTOLIC BLOOD PRESSURE: 174 MMHG | WEIGHT: 194 LBS | HEIGHT: 62 IN | DIASTOLIC BLOOD PRESSURE: 93 MMHG | RESPIRATION RATE: 16 BRPM

## 2020-04-11 DIAGNOSIS — H60.391 OTHER INFECTIVE ACUTE OTITIS EXTERNA OF RIGHT EAR: Primary | ICD-10-CM

## 2020-04-11 DIAGNOSIS — R21 RASH: ICD-10-CM

## 2020-04-11 DIAGNOSIS — H65.93 BILATERAL NON-SUPPURATIVE OTITIS MEDIA: ICD-10-CM

## 2020-04-11 PROCEDURE — G0382 LEV 3 HOSP TYPE B ED VISIT: HCPCS | Performed by: PHYSICIAN ASSISTANT

## 2020-04-11 RX ORDER — DIPHENHYDRAMINE HCL 25 MG
25 TABLET ORAL EVERY 6 HOURS PRN
Qty: 30 TABLET | Refills: 0 | Status: SHIPPED | OUTPATIENT
Start: 2020-04-11 | End: 2020-04-27

## 2020-04-11 RX ORDER — OFLOXACIN 3 MG/ML
10 SOLUTION AURICULAR (OTIC) DAILY
Qty: 5 ML | Refills: 0 | Status: SHIPPED | OUTPATIENT
Start: 2020-04-11 | End: 2020-04-27

## 2020-04-11 RX ORDER — AZITHROMYCIN 250 MG/1
TABLET, FILM COATED ORAL
Qty: 6 TABLET | Refills: 0 | Status: SHIPPED | OUTPATIENT
Start: 2020-04-11 | End: 2020-04-15

## 2020-04-27 ENCOUNTER — OFFICE VISIT (OUTPATIENT)
Dept: OBGYN CLINIC | Facility: HOSPITAL | Age: 50
End: 2020-04-27
Payer: COMMERCIAL

## 2020-04-27 VITALS
BODY MASS INDEX: 35.88 KG/M2 | HEIGHT: 62 IN | HEART RATE: 77 BPM | DIASTOLIC BLOOD PRESSURE: 85 MMHG | SYSTOLIC BLOOD PRESSURE: 133 MMHG | WEIGHT: 195 LBS

## 2020-04-27 DIAGNOSIS — M22.2X2 PATELLOFEMORAL DISORDER OF LEFT KNEE: ICD-10-CM

## 2020-04-27 DIAGNOSIS — M17.12 PRIMARY OSTEOARTHRITIS OF LEFT KNEE: ICD-10-CM

## 2020-04-27 DIAGNOSIS — Z98.890 S/P LEFT KNEE ARTHROSCOPY: Primary | ICD-10-CM

## 2020-04-27 PROCEDURE — 3008F BODY MASS INDEX DOCD: CPT | Performed by: ORTHOPAEDIC SURGERY

## 2020-04-27 PROCEDURE — 99213 OFFICE O/P EST LOW 20 MIN: CPT | Performed by: ORTHOPAEDIC SURGERY

## 2020-04-27 PROCEDURE — 1036F TOBACCO NON-USER: CPT | Performed by: ORTHOPAEDIC SURGERY

## 2020-05-15 ENCOUNTER — OFFICE VISIT (OUTPATIENT)
Dept: FAMILY MEDICINE CLINIC | Facility: CLINIC | Age: 50
End: 2020-05-15
Payer: COMMERCIAL

## 2020-05-15 VITALS
BODY MASS INDEX: 35.7 KG/M2 | TEMPERATURE: 98.5 F | OXYGEN SATURATION: 96 % | DIASTOLIC BLOOD PRESSURE: 80 MMHG | HEIGHT: 62 IN | HEART RATE: 86 BPM | WEIGHT: 194 LBS | RESPIRATION RATE: 17 BRPM | SYSTOLIC BLOOD PRESSURE: 130 MMHG

## 2020-05-15 DIAGNOSIS — Z12.11 SCREENING FOR COLORECTAL CANCER: ICD-10-CM

## 2020-05-15 DIAGNOSIS — Z00.00 ANNUAL PHYSICAL EXAM: Primary | ICD-10-CM

## 2020-05-15 DIAGNOSIS — E28.319 EARLY MENOPAUSE: ICD-10-CM

## 2020-05-15 DIAGNOSIS — Z12.12 SCREENING FOR COLORECTAL CANCER: ICD-10-CM

## 2020-05-15 DIAGNOSIS — E55.9 VITAMIN D DEFICIENCY: ICD-10-CM

## 2020-05-15 PROCEDURE — 99396 PREV VISIT EST AGE 40-64: CPT | Performed by: FAMILY MEDICINE

## 2020-05-15 PROCEDURE — 3008F BODY MASS INDEX DOCD: CPT | Performed by: FAMILY MEDICINE

## 2020-06-29 ENCOUNTER — HOSPITAL ENCOUNTER (OUTPATIENT)
Dept: RADIOLOGY | Age: 50
Discharge: HOME | End: 2020-06-29
Payer: COMMERCIAL

## 2020-06-29 VITALS — WEIGHT: 194 LBS | HEIGHT: 62 IN | BODY MASS INDEX: 35.7 KG/M2

## 2020-06-29 DIAGNOSIS — Z12.39 SCREENING FOR BREAST CANCER: ICD-10-CM

## 2020-06-29 DIAGNOSIS — Z12.31 ENCOUNTER FOR SCREENING MAMMOGRAM FOR BREAST CANCER: ICD-10-CM

## 2020-06-29 PROCEDURE — 77067 SCR MAMMO BI INCL CAD: CPT

## 2020-06-29 PROCEDURE — 77063 BREAST TOMOSYNTHESIS BI: CPT

## 2020-10-20 ENCOUNTER — APPOINTMENT (EMERGENCY)
Dept: RADIOLOGY | Facility: HOSPITAL | Age: 50
End: 2020-10-20
Payer: COMMERCIAL

## 2020-10-20 ENCOUNTER — HOSPITAL ENCOUNTER (EMERGENCY)
Facility: HOSPITAL | Age: 50
Discharge: HOME | End: 2020-10-20
Attending: EMERGENCY MEDICINE | Admitting: EMERGENCY MEDICINE
Payer: COMMERCIAL

## 2020-10-20 VITALS
BODY MASS INDEX: 35.28 KG/M2 | RESPIRATION RATE: 17 BRPM | HEART RATE: 101 BPM | OXYGEN SATURATION: 97 % | WEIGHT: 192.9 LBS | DIASTOLIC BLOOD PRESSURE: 87 MMHG | SYSTOLIC BLOOD PRESSURE: 140 MMHG | TEMPERATURE: 98.6 F

## 2020-10-20 DIAGNOSIS — R51.9 HEADACHE: ICD-10-CM

## 2020-10-20 DIAGNOSIS — M79.10 MYALGIA: ICD-10-CM

## 2020-10-20 DIAGNOSIS — Z20.822 SUSPECTED COVID-19 VIRUS INFECTION: Primary | ICD-10-CM

## 2020-10-20 PROCEDURE — U0003 INFECTIOUS AGENT DETECTION BY NUCLEIC ACID (DNA OR RNA); SEVERE ACUTE RESPIRATORY SYNDROME CORONAVIRUS 2 (SARS-COV-2) (CORONAVIRUS DISEASE [COVID-19]), AMPLIFIED PROBE TECHNIQUE, MAKING USE OF HIGH THROUGHPUT TECHNOLOGIES AS DESCRIBED BY CMS-2020-01-R: HCPCS | Performed by: EMERGENCY MEDICINE

## 2020-10-20 PROCEDURE — 99285 EMERGENCY DEPT VISIT HI MDM: CPT | Performed by: EMERGENCY MEDICINE

## 2020-10-20 PROCEDURE — 99285 EMERGENCY DEPT VISIT HI MDM: CPT

## 2020-10-20 PROCEDURE — 96372 THER/PROPH/DIAG INJ SC/IM: CPT

## 2020-10-20 PROCEDURE — 71045 X-RAY EXAM CHEST 1 VIEW: CPT

## 2020-10-20 RX ORDER — KETOROLAC TROMETHAMINE 30 MG/ML
15 INJECTION, SOLUTION INTRAMUSCULAR; INTRAVENOUS ONCE
Status: COMPLETED | OUTPATIENT
Start: 2020-10-20 | End: 2020-10-20

## 2020-10-20 RX ADMIN — KETOROLAC TROMETHAMINE 15 MG: 30 INJECTION, SOLUTION INTRAMUSCULAR at 17:11

## 2020-10-21 LAB — SARS-COV-2 RNA SPEC QL NAA+PROBE: NOT DETECTED

## 2020-11-12 ENCOUNTER — OFFICE VISIT (OUTPATIENT)
Dept: OBGYN CLINIC | Facility: MEDICAL CENTER | Age: 50
End: 2020-11-12
Payer: COMMERCIAL

## 2020-11-12 VITALS
BODY MASS INDEX: 35.12 KG/M2 | HEART RATE: 90 BPM | TEMPERATURE: 97.7 F | DIASTOLIC BLOOD PRESSURE: 85 MMHG | SYSTOLIC BLOOD PRESSURE: 138 MMHG | WEIGHT: 192 LBS

## 2020-11-12 DIAGNOSIS — M17.12 PRIMARY OSTEOARTHRITIS OF LEFT KNEE: Primary | ICD-10-CM

## 2020-11-12 PROCEDURE — 1036F TOBACCO NON-USER: CPT | Performed by: ORTHOPAEDIC SURGERY

## 2020-11-12 PROCEDURE — 99213 OFFICE O/P EST LOW 20 MIN: CPT | Performed by: ORTHOPAEDIC SURGERY

## 2020-11-12 RX ORDER — NAPROXEN 500 MG/1
500 TABLET ORAL 2 TIMES DAILY WITH MEALS
Qty: 28 TABLET | Refills: 0 | Status: SHIPPED | OUTPATIENT
Start: 2020-11-12 | End: 2021-07-20 | Stop reason: SDUPTHER

## 2020-12-01 ENCOUNTER — APPOINTMENT (OUTPATIENT)
Dept: RADIOLOGY | Facility: MEDICAL CENTER | Age: 50
End: 2020-12-01
Payer: COMMERCIAL

## 2020-12-01 ENCOUNTER — OFFICE VISIT (OUTPATIENT)
Dept: OBGYN CLINIC | Facility: MEDICAL CENTER | Age: 50
End: 2020-12-01
Payer: COMMERCIAL

## 2020-12-01 VITALS
SYSTOLIC BLOOD PRESSURE: 124 MMHG | DIASTOLIC BLOOD PRESSURE: 78 MMHG | BODY MASS INDEX: 32.11 KG/M2 | TEMPERATURE: 98.4 F | WEIGHT: 181.2 LBS | HEIGHT: 63 IN | HEART RATE: 79 BPM

## 2020-12-01 DIAGNOSIS — M17.12 PRIMARY OSTEOARTHRITIS OF LEFT KNEE: ICD-10-CM

## 2020-12-01 DIAGNOSIS — M25.461 EFFUSION OF RIGHT KNEE: ICD-10-CM

## 2020-12-01 DIAGNOSIS — Z01.89 ENCOUNTER FOR LOWER EXTREMITY COMPARISON IMAGING STUDY: ICD-10-CM

## 2020-12-01 DIAGNOSIS — M25.562 LEFT KNEE PAIN, UNSPECIFIED CHRONICITY: ICD-10-CM

## 2020-12-01 DIAGNOSIS — M17.12 PRIMARY OSTEOARTHRITIS OF LEFT KNEE: Primary | ICD-10-CM

## 2020-12-01 PROCEDURE — 3008F BODY MASS INDEX DOCD: CPT | Performed by: ORTHOPAEDIC SURGERY

## 2020-12-01 PROCEDURE — 1036F TOBACCO NON-USER: CPT | Performed by: ORTHOPAEDIC SURGERY

## 2020-12-01 PROCEDURE — 73562 X-RAY EXAM OF KNEE 3: CPT

## 2020-12-01 PROCEDURE — 99213 OFFICE O/P EST LOW 20 MIN: CPT | Performed by: ORTHOPAEDIC SURGERY

## 2020-12-01 PROCEDURE — 73564 X-RAY EXAM KNEE 4 OR MORE: CPT

## 2021-02-19 ENCOUNTER — OFFICE VISIT (OUTPATIENT)
Dept: OBGYN CLINIC | Facility: MEDICAL CENTER | Age: 51
End: 2021-02-19
Payer: COMMERCIAL

## 2021-02-19 VITALS
BODY MASS INDEX: 32.07 KG/M2 | HEIGHT: 63 IN | HEART RATE: 96 BPM | WEIGHT: 181 LBS | DIASTOLIC BLOOD PRESSURE: 76 MMHG | SYSTOLIC BLOOD PRESSURE: 115 MMHG

## 2021-02-19 DIAGNOSIS — M17.12 PRIMARY OSTEOARTHRITIS OF LEFT KNEE: Primary | ICD-10-CM

## 2021-02-19 PROCEDURE — 99213 OFFICE O/P EST LOW 20 MIN: CPT | Performed by: ORTHOPAEDIC SURGERY

## 2021-02-19 PROCEDURE — 3008F BODY MASS INDEX DOCD: CPT | Performed by: ORTHOPAEDIC SURGERY

## 2021-02-19 PROCEDURE — 1036F TOBACCO NON-USER: CPT | Performed by: ORTHOPAEDIC SURGERY

## 2021-02-19 RX ORDER — MELOXICAM 15 MG/1
15 TABLET ORAL DAILY
Qty: 30 TABLET | Refills: 3 | Status: SHIPPED | OUTPATIENT
Start: 2021-02-19 | End: 2021-05-28

## 2021-02-19 NOTE — LETTER
February 19, 2021     Patient: Keyla Sanchez   YOB: 1970   Date of Visit: 2/19/2021       To Whom it May Concern:    Keyla Sanchez is under my professional care  She was seen in my office on 2/19/2021  She can return to work on 2/20/21  If you have any questions or concerns, please don't hesitate to call           Sincerely,          Charo Pang MD        CC: Keyla Laura

## 2021-02-19 NOTE — PROGRESS NOTES
Orthopaedic Surgery - Office Note  Dale Nuñez (45 y o  female)   : 1970   MRN: [id number]  Encounter Date: 2021    Chief Complaint   Patient presents with    Left Knee - Follow-up       Assessment / Plan  left knee osteoarthritis     · Activity as tolerated  · At this time we will order viscosupplementation injection for the left knee as the patient did believe she had good relief following her last 1 in   She has had worsening discomfort following steroid injections in the past   · Continue medial  brace p r n  · Continue with home exercise  · Anti-inflammatories or Tylenol prn pain  · Compression and ice p r n  for swelling and pain control  · We again had discussion about total knee replacement and expectations for recovery  Due to the patient's age and current work status she would like to continue to hold off on knee replacement at this time  We will continue with conservative treatment options  Return for  follow-up when authorization for viscosupplementation is obtained  Beverly Cancino History of Present Illness  Dale Nuñez is a 45 y o  female follow-up for left knee pain secondary to osteoarthritis  She has been treating her left knee pain in the past with Dr Terell Torres  She has been experiencing left knee pain for 3-4 years  She states in the past with Dr Terell Torres she has tried physical therapy, an  brace, euflexa injections last in , CSI last in  with increased discomfort afterwards and a left knee menisectomy which was performed in 2020, and she continues to have pain with minimum improvement of symptoms  She indicates her pain only stays in her knee  She does have a physical job which does increase her pain  She also indicates that she experiences a locking sensation with walking  She does experience stiffness with long periods of walking or sitting  She denies any further injury or trauma to her left knee  She denies any distal paresthesias      Review of Systems  Pertinent items are noted in HPI  All other systems were reviewed and are negative  Physical Exam  /76   Pulse 96   Ht 5' 3" (1 6 m)   Wt 82 1 kg (181 lb)   BMI 32 06 kg/m²   Cons: Appears well  No apparent distress  Psych: Alert  Oriented x3  Mood and affect normal   Eyes: PERRLA, EOMI  Resp: Normal effort  No audible wheezing or stridor  CV: Palpable pulse  No discernable arrhythmia  No LE edema  Lymph:  No palpable cervical, axillary, or inguinal lymphadenopathy  Skin: Warm  No palpable masses  No visible lesions  Neuro: Normal muscle tone  Normal and symmetric DTR's  Left Knee Exam  Alignment:  Normal knee alignment  Inspection:  No swelling  No edema  No erythema  Incisions healed  Palpation:  mild medial and severe lateral joint line tenderness  patella crepitus  ROM:  Knee Extension 0  Knee Flexion 120  Strength:  Able to SLR without lag  Stability:  No objective knee instability  Stable Varus / Valgus stress, Lachman, and Posterior drawer  Tests:  (-) Carla  Patella:  Patella tracks centrally with crepitus  Normal patellar mobility  Neurovascular:  Sensation intact in DP/SP/Anderson/Sa/T nerve distributions  2+ DP & PT pulses  Gait:  Antalgic  Studies Reviewed  XR of left knee - I personally reviewed her past x-rays which demonstrated moderate left knee osteoarthritis     Procedures  No procedures today  Medical, Surgical, Family, and Social History  The patient's medical history, family history, and social history, were reviewed and updated as appropriate      Past Medical History:   Diagnosis Date    Arthritis     Bacterial vaginosis     Migraine     Obesity     Varicella        Past Surgical History:   Procedure Laterality Date    NO PAST SURGERIES      VT KNEE SCOPE,MED/LAT MENISECTOMY Left 2/12/2020    Procedure: ARTHROSCOPY KNEE partial medial menisectomy;  Surgeon: Rock Sergio MD;  [facility];  Service: Orthopedics       Family History   Problem Relation Age of Onset    Prostate cancer Father     Cancer Father     Hypertension Mother     No Known Problems Sister     No Known Problems Maternal Grandmother     No Known Problems Maternal Grandfather     No Known Problems Paternal Grandmother     No Known Problems Paternal Grandfather     No Known Problems Maternal Aunt     No Known Problems Paternal Aunt        Social History     Occupational History    Not on file   Tobacco Use    Smoking status: Never Smoker    Smokeless tobacco: Never Used   Substance and Sexual Activity    Alcohol use:  Yes     Alcohol/week: 1 0 standard drinks     Types: 1 Cans of beer per week     Frequency: Monthly or less     Drinks per session: 1 or 2     Binge frequency: Never     Comment: rare    Drug use: No    Sexual activity: Yes     Partners: Male       Allergies   Allergen Reactions    Penicillins Anaphylaxis         Current Outpatient Medications:     Ascorbic Acid (VITAMIN C GUMMIE PO), Take 2 gums by mouth daily, Disp: , Rfl:     Aspirin-Acetaminophen-Caffeine (EXCEDRIN PO), Take by mouth as needed, Disp: , Rfl:     Biotin 10 MG CAPS, Take 1 capsule by mouth daily, Disp: , Rfl:     celecoxib (CeleBREX) 50 MG capsule, Take 1 capsule (50 mg total) by mouth 2 (two) times a day, Disp: 60 capsule, Rfl: 0    COLLAGEN PO, Take by mouth daily, Disp: , Rfl:     Cyanocobalamin (VITAMIN B 12 PO), Take by mouth every other day, Disp: , Rfl:     docusate sodium (COLACE) 100 mg capsule, Take 1 capsule (100 mg total) by mouth 2 (two) times a day, Disp: 10 capsule, Rfl: 0    hydrocortisone 2 5 % cream, Apply topically 2 (two) times a day, Disp: 30 g, Rfl: 0    Misc Natural Products (APPLE CIDER VINEGAR DIET PO), Take by mouth daily, Disp: , Rfl:     naproxen (EC NAPROSYN) 500 MG EC tablet, Take 1 tablet (500 mg total) by mouth 2 (two) times a day with meals, Disp: 28 tablet, Rfl: 0    Omega-3 1000 MG CAPS, Take 1 capsule by mouth daily, Disp: , Rfl:     oxyCODONE (ROXICODONE) 5 mg immediate release tablet, Take 1 tablets PO q 4 hours PRN Pain, Disp: 10 tablet, Rfl: 0    VITAMIN A PO, Take by mouth every other day, Disp: , Rfl:     meloxicam (MOBIC) 15 mg tablet, Take 1 tablet (15 mg total) by mouth daily, Disp: 30 tablet, Rfl: 3    naproxen (NAPROSYN) 500 mg tablet, Take 1 tablet (500 mg total) by mouth 2 (two) times a day with meals for 120 days (Patient not taking: Reported on 3/31/2020), Disp: 60 tablet, Rfl: 3      Tiarra Vences PA-C    Scribe Attestation    I,:   am acting as a scribe while in the presence of the attending physician :       I,:   personally performed the services described in this documentation    as scribed in my presence :

## 2021-02-22 ENCOUNTER — TELEPHONE (OUTPATIENT)
Dept: OBGYN CLINIC | Facility: HOSPITAL | Age: 51
End: 2021-02-22

## 2021-02-22 DIAGNOSIS — M17.12 PRIMARY OSTEOARTHRITIS OF LEFT KNEE: Primary | ICD-10-CM

## 2021-02-22 NOTE — TELEPHONE ENCOUNTER
Patient Sees Dr Dilcia Salmeron    Patient called in & gave us her insurance Information, she is looking to get Gel Injections for her L Knee    Insurance: [facility]  ID # [id number]    I placed information in patients chart

## 2021-03-05 ENCOUNTER — TELEPHONE (OUTPATIENT)
Dept: OBGYN CLINIC | Facility: HOSPITAL | Age: 51
End: 2021-03-05

## 2021-03-05 NOTE — TELEPHONE ENCOUNTER
Patient Sees Rachel Cullen    Patient called to see when her injection will be approved   She is in a lot of pain     - [phone]

## 2021-04-09 ENCOUNTER — OFFICE VISIT (OUTPATIENT)
Dept: OBGYN CLINIC | Facility: MEDICAL CENTER | Age: 51
End: 2021-04-09
Payer: COMMERCIAL

## 2021-04-09 VITALS
SYSTOLIC BLOOD PRESSURE: 161 MMHG | DIASTOLIC BLOOD PRESSURE: 94 MMHG | HEART RATE: 98 BPM | WEIGHT: 181 LBS | BODY MASS INDEX: 32.07 KG/M2 | HEIGHT: 63 IN

## 2021-04-09 DIAGNOSIS — M17.12 PRIMARY OSTEOARTHRITIS OF LEFT KNEE: Primary | ICD-10-CM

## 2021-04-09 PROCEDURE — 20610 DRAIN/INJ JOINT/BURSA W/O US: CPT | Performed by: PHYSICIAN ASSISTANT

## 2021-04-09 RX ORDER — BUPIVACAINE HYDROCHLORIDE 2.5 MG/ML
4 INJECTION, SOLUTION INFILTRATION; PERINEURAL
Status: COMPLETED | OUTPATIENT
Start: 2021-04-09 | End: 2021-04-09

## 2021-04-09 RX ADMIN — BUPIVACAINE HYDROCHLORIDE 4 ML: 2.5 INJECTION, SOLUTION INFILTRATION; PERINEURAL at 08:53

## 2021-04-09 NOTE — PROGRESS NOTES
Orthopaedic Surgery - Office Note  Marciano Goodrich (29 y o  female)   : 1970   MRN: [id number]  Encounter Date: 2021    No chief complaint on file  Assessment / Plan  left knee osteoarthritis     · After discussion of risk and benefits the patient did agree to proceed with a injection of viscosupplementation with Durolane  This was prepared and injected sterilely and tolerated well  · Continue medial  brace p r n  · Continue with home exercise  · Anti-inflammatories or Tylenol prn pain  · Compression and ice p r n  for swelling and pain control  Return in about 3 months (around 2021)  History of Present Illness  Marciano Goodrich is a 45 y o  female follow-up for left knee pain secondary to osteoarthritis  She has been treating her left knee pain in the past with Dr Dylan Patricio  She has been experiencing left knee pain for 3-4 years  She states in the past with Dr Dylan Patricio she has tried physical therapy, an  brace, euflexa injections last in , CSI last in  with increased discomfort afterwards and a left knee menisectomy which was performed in 2020, and she continues to have pain with minimum improvement of symptoms  She indicates her pain only stays in her knee  She does have a physical job which does increase her pain  She also indicates that she experiences a locking sensation with walking  She does experience stiffness with long periods of walking or sitting  She denies any further injury or trauma to her left knee  She denies any distal paresthesias  She is here today for viscosupplementation injection  Review of Systems  Pertinent items are noted in HPI  All other systems were reviewed and are negative  Physical Exam  /94   Pulse 98   Ht 5' 3" (1 6 m)   Wt 82 1 kg (181 lb)   BMI 32 06 kg/m²   Cons: Appears well  No apparent distress  Psych: Alert  Oriented x3  Mood and affect normal   Eyes: PERRLA, EOMI  Resp: Normal effort    No audible wheezing or stridor  CV: Palpable pulse  No discernable arrhythmia  No LE edema  Lymph:  No palpable cervical, axillary, or inguinal lymphadenopathy  Skin: Warm  No palpable masses  No visible lesions  Neuro: Normal muscle tone  Normal and symmetric DTR's  Left Knee Exam  Alignment:  Normal knee alignment  Inspection:  No swelling  No edema  No erythema  Incisions healed  Palpation:  mild medial and severe lateral joint line tenderness  patella crepitus  ROM:  Knee Extension 0  Knee Flexion 120  Strength:  Able to SLR without lag  Stability:  No objective knee instability  Stable Varus / Valgus stress, Lachman, and Posterior drawer  Tests:  (-) Carla  Patella:  Patella tracks centrally with crepitus  Normal patellar mobility  Neurovascular:  Sensation intact in DP/SP/Anderson/Sa/T nerve distributions  2+ DP & PT pulses  Gait:  Antalgic  Studies Reviewed  XR of left knee - I personally reviewed her past x-rays which demonstrated moderate left knee osteoarthritis     Large joint arthrocentesis: L knee  Universal Protocol:  Consent: Verbal consent obtained  Consent given by: patient    Supporting Documentation  Indications: pain   Procedure Details  Location: knee - L knee  Needle size: 22 G  Approach: lateral  Medications administered: 4 mL bupivacaine 0 25 %; 3 mL sodium hyaluronate 60 MG/3ML    Patient tolerance: patient tolerated the procedure well with no immediate complications  Dressing:  Sterile dressing applied             Medical, Surgical, Family, and Social History  The patient's medical history, family history, and social history, were reviewed and updated as appropriate      Past Medical History:   Diagnosis Date    Arthritis     Bacterial vaginosis     Migraine     Obesity     Varicella        Past Surgical History:   Procedure Laterality Date    NO PAST SURGERIES      RI KNEE SCOPE,MED/LAT MENISECTOMY Left 2/12/2020    Procedure: ARTHROSCOPY KNEE partial medial menisectomy;  Surgeon: La Nena Mabry MD;  [facility];  Service: Orthopedics       Family History   Problem Relation Age of Onset    Prostate cancer Father     Cancer Father     Hypertension Mother     No Known Problems Sister     No Known Problems Maternal Grandmother     No Known Problems Maternal Grandfather     No Known Problems Paternal Grandmother     No Known Problems Paternal Grandfather     No Known Problems Maternal Aunt     No Known Problems Paternal Aunt        Social History     Occupational History    Not on file   Tobacco Use    Smoking status: Never Smoker    Smokeless tobacco: Never Used   Substance and Sexual Activity    Alcohol use:  Yes     Alcohol/week: 1 0 standard drinks     Types: 1 Cans of beer per week     Frequency: Monthly or less     Drinks per session: 1 or 2     Binge frequency: Never     Comment: rare    Drug use: No    Sexual activity: Yes     Partners: Male       Allergies   Allergen Reactions    Penicillins Anaphylaxis         Current Outpatient Medications:     Ascorbic Acid (VITAMIN C GUMMIE PO), Take 2 gums by mouth daily, Disp: , Rfl:     Aspirin-Acetaminophen-Caffeine (EXCEDRIN PO), Take by mouth as needed, Disp: , Rfl:     Biotin 10 MG CAPS, Take 1 capsule by mouth daily, Disp: , Rfl:     celecoxib (CeleBREX) 50 MG capsule, Take 1 capsule (50 mg total) by mouth 2 (two) times a day, Disp: 60 capsule, Rfl: 0    COLLAGEN PO, Take by mouth daily, Disp: , Rfl:     Cyanocobalamin (VITAMIN B 12 PO), Take by mouth every other day, Disp: , Rfl:     docusate sodium (COLACE) 100 mg capsule, Take 1 capsule (100 mg total) by mouth 2 (two) times a day, Disp: 10 capsule, Rfl: 0    hydrocortisone 2 5 % cream, Apply topically 2 (two) times a day, Disp: 30 g, Rfl: 0    meloxicam (MOBIC) 15 mg tablet, Take 1 tablet (15 mg total) by mouth daily, Disp: 30 tablet, Rfl: 3    Misc Natural Products (APPLE CIDER VINEGAR DIET PO), Take by mouth daily, Disp: , Rfl:   naproxen (EC NAPROSYN) 500 MG EC tablet, Take 1 tablet (500 mg total) by mouth 2 (two) times a day with meals, Disp: 28 tablet, Rfl: 0    Omega-3 1000 MG CAPS, Take 1 capsule by mouth daily, Disp: , Rfl:     oxyCODONE (ROXICODONE) 5 mg immediate release tablet, Take 1 tablets PO q 4 hours PRN Pain, Disp: 10 tablet, Rfl: 0    VITAMIN A PO, Take by mouth every other day, Disp: , Rfl:       Carmen Prajapati PA-C    Scribe Attestation    I,:   am acting as a scribe while in the presence of the attending physician :       I,:   personally performed the services described in this documentation    as scribed in my presence :

## 2021-04-09 NOTE — LETTER
April 9, 2021     Patient: Marciano Goodrich   YOB: 1970   Date of Visit: 4/9/2021       To Whom it May Concern:    Marciano Goodrich is under my professional care  She was seen in my office on 4/9/2021  She should be out of work the rest of today with the okay to return to full duty tomorrow  If you have any questions or concerns, please don't hesitate to call           Sincerely,          Jeffery Lyon MD        CC: Marciano Goodrich

## 2021-05-28 ENCOUNTER — OFFICE VISIT (OUTPATIENT)
Dept: FAMILY MEDICINE CLINIC | Facility: CLINIC | Age: 51
End: 2021-05-28
Payer: COMMERCIAL

## 2021-05-28 VITALS
HEART RATE: 102 BPM | BODY MASS INDEX: 32.21 KG/M2 | DIASTOLIC BLOOD PRESSURE: 98 MMHG | TEMPERATURE: 99.3 F | SYSTOLIC BLOOD PRESSURE: 140 MMHG | OXYGEN SATURATION: 97 % | WEIGHT: 181.8 LBS | HEIGHT: 63 IN | RESPIRATION RATE: 16 BRPM

## 2021-05-28 DIAGNOSIS — Z13.220 SCREENING FOR HYPERLIPIDEMIA: ICD-10-CM

## 2021-05-28 DIAGNOSIS — Z00.00 ANNUAL PHYSICAL EXAM: Primary | ICD-10-CM

## 2021-05-28 DIAGNOSIS — Z12.11 SCREENING FOR COLON CANCER: ICD-10-CM

## 2021-05-28 DIAGNOSIS — I10 ESSENTIAL HYPERTENSION: ICD-10-CM

## 2021-05-28 DIAGNOSIS — Z13.0 SCREENING, ANEMIA, DEFICIENCY, IRON: ICD-10-CM

## 2021-05-28 DIAGNOSIS — Z13.1 SCREENING FOR DIABETES MELLITUS (DM): ICD-10-CM

## 2021-05-28 PROBLEM — B97.7 HPV IN FEMALE: Status: ACTIVE | Noted: 2020-03-06

## 2021-05-28 PROBLEM — M17.11 TRICOMPARTMENT OSTEOARTHRITIS OF RIGHT KNEE: Status: ACTIVE | Noted: 2017-05-26

## 2021-05-28 PROCEDURE — 3008F BODY MASS INDEX DOCD: CPT | Performed by: FAMILY MEDICINE

## 2021-05-28 PROCEDURE — 99396 PREV VISIT EST AGE 40-64: CPT | Performed by: FAMILY MEDICINE

## 2021-05-28 PROCEDURE — 99213 OFFICE O/P EST LOW 20 MIN: CPT | Performed by: FAMILY MEDICINE

## 2021-05-28 PROCEDURE — 1036F TOBACCO NON-USER: CPT | Performed by: FAMILY MEDICINE

## 2021-05-28 RX ORDER — IBUPROFEN 600 MG/1
600 TABLET ORAL EVERY 6 HOURS PRN
COMMUNITY

## 2021-05-28 RX ORDER — BUPROPION HYDROCHLORIDE 150 MG/1
150 TABLET ORAL EVERY MORNING
COMMUNITY
Start: 2021-03-30

## 2021-05-28 RX ORDER — PHENTERMINE HYDROCHLORIDE 37.5 MG/1
37.5 CAPSULE ORAL
COMMUNITY

## 2021-05-28 NOTE — ASSESSMENT & PLAN NOTE
· Dexa due in 2024  · She reports having a colonoscopy last year  · Mammogram due after 6/29  Already schedule for July  · She is UTD on pap  Has a gynecologist  Sukumar Monson  · Blood work ordered  · Fu in 1 year

## 2021-05-28 NOTE — PROGRESS NOTES
[address] [facility]    NAME: Sammie Barrientos  AGE: 45 y o  SEX: female  : 1970     DATE: 2021     Assessment and Plan:     Problem List Items Addressed This Visit        Cardiovascular and Mediastinum    Essential hypertension     She is on phentermine and wellbutrin from weight mgmt  She has not used these for about 3 weeks  Decrease salt in diet  Fu in 1 month for recheck and if still elevated start amlodipine 5  Hold on restarting phentermine and Wellbutrin until bp is controlled  Other    Annual physical exam - Primary     · Dexa due in   · She reports having a colonoscopy last year  · Mammogram due after   Already schedule for July  · She is UTD on pap  Has a gynecologist  Jose E Livers  · Blood work ordered  · Fu in 1 year  Other Visit Diagnoses     Screening for colon cancer        Screening, anemia, deficiency, iron        Relevant Orders    CBC and Platelet    Screening for hyperlipidemia        Relevant Orders    Lipid panel    Screening for diabetes mellitus (DM)        Relevant Orders    Comprehensive metabolic panel    BMI 66 2-31 5,[facility]              Immunizations and preventive care screenings were discussed with patient today  Appropriate education was printed on patient's after visit summary  Covid vaccines completed  Counseling:  Alcohol/drug use: discussed moderation in alcohol intake, the recommendations for healthy alcohol use, and avoidance of illicit drug use  Dental Health: discussed importance of regular tooth brushing, flossing, and dental visits  · Exercise: the importance of regular exercise/physical activity was discussed  Recommend exercise 3-5 times per week for at least 30 minutes  BMI Counseling: Body mass index is 32 2 kg/m²   The BMI is above normal  Nutrition recommendations include decreasing portion sizes, encouraging healthy choices of fruits and vegetables and limiting drinks that contain sugar  Exercise recommendations include moderate physical activity 150 minutes/week  Return in 1 year (on 5/28/2022)  Chief Complaint:     Chief Complaint   Patient presents with    Physical Exam      History of Present Illness:     Adult Annual Physical   Patient here for a comprehensive physical exam  The patient reports no problems  Diet and Physical Activity  · Diet/Nutrition: well balanced diet  · Exercise: walking  Depression Screening  PHQ-9 Depression Screening    PHQ-9:   Frequency of the following problems over the past two weeks:           General Health  · Sleep: sleeps well  · Hearing: no issues  · Vision: no vision problems  · Dental: regular dental visits  /GYN Health  · Last menstrual period: 12 years ago  · Dexa due in 2024  · She reports having a colonoscopy last year  · Mammogram due after 6/29  Already schedule for July  · She is UTD on pap  Has a gynecologist  Ino Leung  Review of Systems:     Review of Systems   Constitutional: Negative for fever and unexpected weight change  HENT: Negative for ear pain, sore throat and trouble swallowing  Eyes: Negative for pain and visual disturbance  Respiratory: Negative for cough, chest tightness, shortness of breath and wheezing  Cardiovascular: Negative for chest pain  Gastrointestinal: Negative for abdominal distention, abdominal pain, blood in stool, constipation, diarrhea, nausea and vomiting  Endocrine: Negative for polydipsia and polyuria  Genitourinary: Negative for dysuria and hematuria  Musculoskeletal: Negative for back pain and myalgias  Skin: Negative for rash  Neurological: Negative for syncope and headaches  Psychiatric/Behavioral: Negative for suicidal ideas        Past Medical History:     Past Medical History:   Diagnosis Date    Arthritis     Bacterial vaginosis     Migraine     Obesity     Varicella       Past Surgical History:     Past Surgical History:   Procedure Laterality Date    NO PAST SURGERIES      NH KNEE SCOPE,MED/LAT MENISECTOMY Left 2/12/2020    Procedure: ARTHROSCOPY KNEE partial medial menisectomy;  Surgeon: Landry Fonseca MD;  [facility];  Service: Orthopedics      Social History:        Social History     Socioeconomic History    Marital status: /Civil Union     Spouse name: None    Number of children: 1    Years of education: None    Highest education level: None   Occupational History    None   Social Needs    Financial resource strain: Not very hard    Food insecurity     Worry: Never true     Inability: Never true    Transportation needs     Medical: No     Non-medical: No   Tobacco Use    Smoking status: Never Smoker    Smokeless tobacco: Never Used   Substance and Sexual Activity    Alcohol use: Yes     Alcohol/week: 1 0 standard drinks     Types: 1 Cans of beer per week     Frequency: Monthly or less     Drinks per session: 1 or 2     Binge frequency: Never     Comment: rare    Drug use: No    Sexual activity: Yes     Partners: Male   Lifestyle    Physical activity     Days per week: 0 days     Minutes per session: 0 min    Stress:  Only a little   Relationships    Social connections     Talks on phone: Patient refused     Gets together: Patient refused     Attends Confucianism service: Patient refused     Active member of club or organization: Patient refused     Attends meetings of clubs or organizations: Patient refused     Relationship status: Patient refused    Intimate partner violence     Fear of current or ex partner: Patient refused     Emotionally abused: Patient refused     Physically abused: Patient refused     Forced sexual activity: Patient refused   Other Topics Concern    None   Social History Narrative    Uses seatbelts      Family History:     Family History   Problem Relation Age of Onset    Prostate cancer Father     Cancer Father    Jonas Garg Hypertension Mother     No Known Problems Sister     No Known Problems Maternal Grandmother     No Known Problems Maternal Grandfather     No Known Problems Paternal Grandmother     No Known Problems Paternal Grandfather     No Known Problems Maternal Aunt     No Known Problems Paternal Aunt       Current Medications:     Current Outpatient Medications   Medication Sig Dispense Refill    Ascorbic Acid (VITAMIN C GUMMIE PO) Take 2 gums by mouth daily      Aspirin-Acetaminophen-Caffeine (EXCEDRIN PO) Take by mouth as needed      Biotin 10 MG CAPS Take 1 capsule by mouth daily      COLLAGEN PO Take by mouth daily      Cyanocobalamin (VITAMIN B 12 PO) Take by mouth every other day      ibuprofen (MOTRIN) 600 mg tablet Take 600 mg by mouth every 6 (six) hours as needed      Misc Natural Products (APPLE CIDER VINEGAR DIET PO) Take by mouth daily      naproxen (EC NAPROSYN) 500 MG EC tablet Take 1 tablet (500 mg total) by mouth 2 (two) times a day with meals 28 tablet 0    Omega-3 1000 MG CAPS Take 1 capsule by mouth daily      phentermine 37 5 MG capsule Take 37 5 mg by mouth      buPROPion (WELLBUTRIN XL) 150 mg 24 hr tablet Take 150 mg by mouth every morning      DOCOSAHEXAENOIC ACID PO Take 1 capsule by mouth      VITAMIN A PO Take by mouth every other day       No current facility-administered medications for this visit  Allergies: Allergies   Allergen Reactions    Penicillins Anaphylaxis      Physical Exam:     /98 (BP Location: Left arm, Patient Position: Sitting, Cuff Size: Adult)   Pulse 102   Temp 99 3 °F (37 4 °C) (Tympanic)   Resp 16   Ht 5' 3" (1 6 m)   Wt 82 5 kg (181 lb 12 8 oz)   SpO2 97%   BMI 32 20 kg/m²     Physical Exam  Constitutional:       Appearance: She is well-developed  HENT:      Head: Normocephalic and atraumatic  Eyes:      General: No scleral icterus       Conjunctiva/sclera: Conjunctivae normal       Pupils: Pupils are equal, round, and reactive to light  Neck:      Musculoskeletal: Normal range of motion and neck supple  Cardiovascular:      Rate and Rhythm: Normal rate and regular rhythm  Heart sounds: Normal heart sounds  No murmur  No friction rub  No gallop  Pulmonary:      Effort: Pulmonary effort is normal  No respiratory distress  Breath sounds: No wheezing or rales  Chest:      Chest wall: No tenderness  Abdominal:      General: Bowel sounds are normal  There is no distension  Palpations: Abdomen is soft  There is no mass  Tenderness: There is no abdominal tenderness  Musculoskeletal: Normal range of motion  Lymphadenopathy:      Cervical: No cervical adenopathy  Skin:     General: Skin is warm and dry  Capillary Refill: Capillary refill takes less than 2 seconds  Findings: No rash  Neurological:      Mental Status: She is alert and oriented to person, place, and time  Cranial Nerves: No cranial nerve deficit            Alison Currie MD   [address] [place] [address]

## 2021-05-28 NOTE — ASSESSMENT & PLAN NOTE
She is on phentermine and wellbutrin from weight mgmt  She has not used these for about 3 weeks  Decrease salt in diet  Fu in 1 month for recheck and if still elevated start amlodipine 5  Hold on restarting phentermine and Wellbutrin until bp is controlled

## 2021-05-28 NOTE — PROGRESS NOTES
Assessment/Plan:    Essential hypertension  She is on phentermine and wellbutrin from weight mgmt  She has not used these for about 3 weeks  Decrease salt in diet  Fu in 1 month for recheck and if still elevated start amlodipine 5  Hold on restarting phentermine and Wellbutrin until bp is controlled  Annual physical exam  · Dexa due in 2024  · She reports having a colonoscopy last year  · Mammogram due after 6/29  Already schedule for July  · She is UTD on pap  Has a gynecologist  Nancy Vázquez  · Blood work ordered  · Fu in 1 year  Diagnoses and all orders for this visit:    Annual physical exam    Screening for colon cancer    Screening, anemia, deficiency, iron  -     CBC and Platelet; Future    Screening for hyperlipidemia  -     Lipid panel; Future    Screening for diabetes mellitus (DM)  -     Comprehensive metabolic panel; Future    BMI 32 0-32 9,adult    Essential hypertension    Other orders  -     Cancel: Ambulatory referral to Gastroenterology; Future  -     phentermine 37 5 MG capsule; Take 37 5 mg by mouth  -     buPROPion (WELLBUTRIN XL) 150 mg 24 hr tablet; Take 150 mg by mouth every morning  -     DOCOSAHEXAENOIC ACID PO; Take 1 capsule by mouth  -     ibuprofen (MOTRIN) 600 mg tablet; Take 600 mg by mouth every 6 (six) hours as needed          Subjective: htn     Patient ID: Azra Bauer is a 45 y o  female  HPI  Here for an annual physical but was also found to have elevated blood pressure  She has never been diagnosed with htn in the past  She was recently started on wellbutrin and phentermine to assist with weight loss by Dr Danisha Whatley  She has not taken these medications in 3 weeks  Her bp was also elevated last month while at a different doctor's visit  She denies red flag signs and denies consuming high amounts of salt          The following portions of the patient's history were reviewed and updated as appropriate: allergies, current medications, past family history, past medical history, past social history, past surgical history and problem list     Review of Systems   Constitutional: Negative for fever and unexpected weight change  HENT: Negative for ear pain, sore throat and trouble swallowing  Eyes: Negative for pain and visual disturbance  Respiratory: Negative for cough, chest tightness, shortness of breath and wheezing  Cardiovascular: Negative for chest pain  Gastrointestinal: Negative for abdominal distention, abdominal pain, blood in stool, constipation, diarrhea, nausea and vomiting  Endocrine: Negative for polydipsia and polyuria  Genitourinary: Negative for dysuria and hematuria  Musculoskeletal: Negative for back pain and myalgias  Skin: Negative for rash  Neurological: Negative for syncope and headaches  Psychiatric/Behavioral: Negative for suicidal ideas  PHQ-9 Depression Screening    PHQ-9:   Frequency of the following problems over the past two weeks:               Objective:      /98 (BP Location: Left arm, Patient Position: Sitting, Cuff Size: Adult)   Pulse 102   Temp 99 3 °F (37 4 °C) (Tympanic)   Resp 16   Ht 5' 3" (1 6 m)   Wt 82 5 kg (181 lb 12 8 oz)   SpO2 97%   BMI 32 20 kg/m²          Physical Exam  Constitutional:       Appearance: She is well-developed  HENT:      Head: Normocephalic and atraumatic  Right Ear: External ear normal       Left Ear: External ear normal    Eyes:      General: No scleral icterus  Conjunctiva/sclera: Conjunctivae normal    Neck:      Musculoskeletal: Normal range of motion  Pulmonary:      Effort: Pulmonary effort is normal  No respiratory distress  Neurological:      Mental Status: She is alert and oriented to person, place, and time

## 2021-05-28 NOTE — PATIENT INSTRUCTIONS

## 2021-06-21 ENCOUNTER — TELEPHONE (OUTPATIENT)
Dept: FAMILY MEDICINE CLINIC | Facility: CLINIC | Age: 51
End: 2021-06-21

## 2021-06-29 ENCOUNTER — TELEPHONE (OUTPATIENT)
Dept: ADMINISTRATIVE | Facility: OTHER | Age: 51
End: 2021-06-29

## 2021-06-29 NOTE — TELEPHONE ENCOUNTER
----- Message from Sterling Tracy MA sent at 6/28/2021  2:07 PM EDT -----  Regarding: colonoscopy  06/28/21 2:08 PM    Hello, our patient Rene Cardona has had CRC: Colonoscopy completed/performed  Please assist in updating the patient chart by pulling the document from care everywhere tab  Tab within Chart Review   The date of service is Op Note - Sinan Bose MD - 06/26/2020 8:32 AM      Thank you,  Sterling Tracy MA  [facility] INTERNAL MED

## 2021-06-29 NOTE — TELEPHONE ENCOUNTER
Upon review of the In Basket request we were able to locate, review, and update the patient chart as requested for CRC: Colonoscopy  Any additional questions or concerns should be emailed to the Practice Liaisons via [email or web address] email, please do not reply via In Basket      Thank you  Adrian Dunham MA

## 2021-07-09 ENCOUNTER — APPOINTMENT (OUTPATIENT)
Dept: LAB | Facility: MEDICAL CENTER | Age: 51
End: 2021-07-09
Payer: COMMERCIAL

## 2021-07-09 DIAGNOSIS — Z13.220 SCREENING FOR HYPERLIPIDEMIA: ICD-10-CM

## 2021-07-09 DIAGNOSIS — Z13.1 SCREENING FOR DIABETES MELLITUS (DM): ICD-10-CM

## 2021-07-09 DIAGNOSIS — Z13.0 SCREENING, ANEMIA, DEFICIENCY, IRON: ICD-10-CM

## 2021-07-09 LAB
ALBUMIN SERPL BCP-MCNC: 3.9 G/DL (ref 3.5–5)
ALP SERPL-CCNC: 105 U/L (ref 46–116)
ALT SERPL W P-5'-P-CCNC: 29 U/L (ref 12–78)
ANION GAP SERPL CALCULATED.3IONS-SCNC: 1 MMOL/L (ref 4–13)
AST SERPL W P-5'-P-CCNC: 16 U/L (ref 5–45)
BILIRUB SERPL-MCNC: 0.46 MG/DL (ref 0.2–1)
BUN SERPL-MCNC: 13 MG/DL (ref 5–25)
CALCIUM SERPL-MCNC: 9.7 MG/DL (ref 8.3–10.1)
CHLORIDE SERPL-SCNC: 107 MMOL/L (ref 100–108)
CHOLEST SERPL-MCNC: 226 MG/DL (ref 50–200)
CO2 SERPL-SCNC: 31 MMOL/L (ref 21–32)
CREAT SERPL-MCNC: 0.6 MG/DL (ref 0.6–1.3)
ERYTHROCYTE [DISTWIDTH] IN BLOOD BY AUTOMATED COUNT: 12.7 % (ref 11.6–15.1)
GFR SERPL CREATININE-BSD FRML MDRD: 106 ML/MIN/1.73SQ M
GLUCOSE P FAST SERPL-MCNC: 90 MG/DL (ref 65–99)
HCT VFR BLD AUTO: 48.5 % (ref 34.8–46.1)
HDLC SERPL-MCNC: 50 MG/DL
HGB BLD-MCNC: 15.2 G/DL (ref 11.5–15.4)
LDLC SERPL CALC-MCNC: 144 MG/DL (ref 0–100)
MCH RBC QN AUTO: 29.3 PG (ref 26.8–34.3)
MCHC RBC AUTO-ENTMCNC: 31.3 G/DL (ref 31.4–37.4)
MCV RBC AUTO: 93 FL (ref 82–98)
NONHDLC SERPL-MCNC: 176 MG/DL
PLATELET # BLD AUTO: 272 THOUSANDS/UL (ref 149–390)
PMV BLD AUTO: 10.1 FL (ref 8.9–12.7)
POTASSIUM SERPL-SCNC: 4.6 MMOL/L (ref 3.5–5.3)
PROT SERPL-MCNC: 8 G/DL (ref 6.4–8.2)
RBC # BLD AUTO: 5.19 MILLION/UL (ref 3.81–5.12)
SODIUM SERPL-SCNC: 139 MMOL/L (ref 136–145)
TRIGL SERPL-MCNC: 160 MG/DL
WBC # BLD AUTO: 7.94 THOUSAND/UL (ref 4.31–10.16)

## 2021-07-09 PROCEDURE — 80061 LIPID PANEL: CPT

## 2021-07-09 PROCEDURE — 85027 COMPLETE CBC AUTOMATED: CPT

## 2021-07-09 PROCEDURE — 80053 COMPREHEN METABOLIC PANEL: CPT

## 2021-07-09 PROCEDURE — 36415 COLL VENOUS BLD VENIPUNCTURE: CPT

## 2021-07-12 ENCOUNTER — HOSPITAL ENCOUNTER (OUTPATIENT)
Dept: RADIOLOGY | Age: 51
Discharge: HOME | End: 2021-07-12
Payer: COMMERCIAL

## 2021-07-12 VITALS — WEIGHT: 178 LBS | BODY MASS INDEX: 32.76 KG/M2 | HEIGHT: 62 IN

## 2021-07-12 DIAGNOSIS — Z12.31 ENCOUNTER FOR SCREENING MAMMOGRAM FOR MALIGNANT NEOPLASM OF BREAST: ICD-10-CM

## 2021-07-12 PROCEDURE — 77067 SCR MAMMO BI INCL CAD: CPT

## 2021-07-12 PROCEDURE — 77063 BREAST TOMOSYNTHESIS BI: CPT

## 2021-07-16 ENCOUNTER — CLINICAL SUPPORT (OUTPATIENT)
Dept: FAMILY MEDICINE CLINIC | Facility: CLINIC | Age: 51
End: 2021-07-16

## 2021-07-16 VITALS — SYSTOLIC BLOOD PRESSURE: 150 MMHG | DIASTOLIC BLOOD PRESSURE: 78 MMHG

## 2021-07-16 DIAGNOSIS — I10 ESSENTIAL HYPERTENSION: Primary | ICD-10-CM

## 2021-07-16 RX ORDER — AMLODIPINE BESYLATE 5 MG/1
5 TABLET ORAL DAILY
Qty: 30 TABLET | Refills: 5 | Status: SHIPPED | OUTPATIENT
Start: 2021-07-16 | End: 2022-06-03

## 2021-07-16 NOTE — PROGRESS NOTES
Patient came in for BP check   150/78    Patient will be started on med and will return in 2 weekd for a recheck

## 2021-07-20 ENCOUNTER — TELEPHONE (OUTPATIENT)
Dept: OBGYN CLINIC | Facility: HOSPITAL | Age: 51
End: 2021-07-20

## 2021-07-20 ENCOUNTER — OFFICE VISIT (OUTPATIENT)
Dept: OBGYN CLINIC | Facility: MEDICAL CENTER | Age: 51
End: 2021-07-20
Payer: COMMERCIAL

## 2021-07-20 VITALS
WEIGHT: 178 LBS | HEIGHT: 63 IN | BODY MASS INDEX: 31.54 KG/M2 | HEART RATE: 91 BPM | SYSTOLIC BLOOD PRESSURE: 130 MMHG | DIASTOLIC BLOOD PRESSURE: 82 MMHG

## 2021-07-20 DIAGNOSIS — M17.12 PRIMARY OSTEOARTHRITIS OF LEFT KNEE: Primary | ICD-10-CM

## 2021-07-20 PROCEDURE — 3008F BODY MASS INDEX DOCD: CPT | Performed by: ORTHOPAEDIC SURGERY

## 2021-07-20 PROCEDURE — 99213 OFFICE O/P EST LOW 20 MIN: CPT | Performed by: ORTHOPAEDIC SURGERY

## 2021-07-20 PROCEDURE — 1036F TOBACCO NON-USER: CPT | Performed by: ORTHOPAEDIC SURGERY

## 2021-07-20 RX ORDER — NAPROXEN 500 MG/1
500 TABLET ORAL 2 TIMES DAILY WITH MEALS
Qty: 60 TABLET | Refills: 2 | Status: SHIPPED | OUTPATIENT
Start: 2021-07-20 | End: 2021-07-20

## 2021-07-20 NOTE — PROGRESS NOTES
Orthopaedic Surgery - Office Note  Charity Jenkins (38 y o  female)   : 1970   MRN: [id number]  Encounter Date: 2021    Chief Complaint   Patient presents with    Left Knee - Follow-up       Assessment / Plan  left knee osteoarthritis     · We did again discuss the options for treatment of osteoarthritis of the knee including conservative versus surgical which would be a total knee replacement  The patient is in agreeance that can should postpone a knee replacement as long as possible  She feels that she does have improvement with the viscosupplementation and would like to continue with this treatment going forward  We will plan for this in October since that has been providing good relief  The patient states that she has not had good success with steroid injections in the past   · Continue medial  brace p r n  · Continue with home exercise and progress activity as tolerated  · Anti-inflammatories or Tylenol prn pain  · Compression and ice p r n  for swelling and pain control  Return in about 3 months (around 10/20/2021)  History of Present Illness  Charity Jenkins is a 45 y o  female following-up for left knee pain secondary to osteoarthritis  Last visit on 2021 the patient did have a Durolane injection which she feels improved her symptoms significantly for the 1st 3 months and now has started to have increased soreness but better than prior to the shot  She has been experiencing left knee pain for 3-4 years  Prior to her last visit, she states in the past with Dr Elidia Carlson she has tried physical therapy, an  brace, euflexa injections last in , CSI last in  with increased discomfort afterwards and a left knee menisectomy which was performed in 2020  She indicates her pain only stays in her knee  She does have a physical job which does increase her pain  She also indicates that she experiences a locking sensation with walking occasionally    She does experience stiffness with long periods of walking or sitting  She denies any further injury or trauma to her left knee  She denies any distal paresthesias  Review of Systems  Pertinent items are noted in HPI  All other systems were reviewed and are negative  Physical Exam  /82   Pulse 91   Ht 5' 3" (1 6 m)   Wt 80 7 kg (178 lb)   BMI 31 53 kg/m²   Cons: Appears well  No apparent distress  Psych: Alert  Oriented x3  Mood and affect normal   Eyes: PERRLA, EOMI  Resp: Normal effort  No audible wheezing or stridor  CV: Palpable pulse  No discernable arrhythmia  No LE edema  Lymph:  No palpable cervical, axillary, or inguinal lymphadenopathy  Skin: Warm  No palpable masses  No visible lesions  Neuro: Normal muscle tone  Normal and symmetric DTR's  Left Knee Exam  Alignment:  Normal knee alignment  Inspection:  No swelling  No edema  No erythema  Incisions healed  Palpation:  Mild tenderness at Both joint lines of the knee  patella crepitus  ROM:  Knee Extension 0  Knee Flexion 120  Strength:  Able to SLR without lag  Stability:  No objective knee instability  Stable Varus / Valgus stress, Lachman, and Posterior drawer  Tests:  (-) Carla  Patella:  Patella tracks centrally with crepitus  Normal patellar mobility  Neurovascular:  Sensation intact in DP/SP/Anderson/Sa/T nerve distributions  2+ DP & PT pulses  Gait:  Antalgic  Studies Reviewed  XR of left knee - I personally reviewed her past x-rays which demonstrated moderate left knee osteoarthritis     Procedures  No procedures today  Medical, Surgical, Family, and Social History  The patient's medical history, family history, and social history, were reviewed and updated as appropriate      Past Medical History:   Diagnosis Date    Arthritis     Bacterial vaginosis     Migraine     Obesity     Varicella        Past Surgical History:   Procedure Laterality Date    NO PAST SURGERIES      AZ KNEE SCOPE,MED/LAT MENISECTOMY Left 2/12/2020    Procedure: ARTHROSCOPY KNEE partial medial menisectomy;  Surgeon: Nathen Uriarte MD;  Location: [facility];  Service: Orthopedics       Family History   Problem Relation Age of Onset    Prostate cancer Father     Cancer Father     Hypertension Mother     No Known Problems Sister     No Known Problems Maternal Grandmother     No Known Problems Maternal Grandfather     No Known Problems Paternal Grandmother     No Known Problems Paternal Grandfather     No Known Problems Maternal Aunt     No Known Problems Paternal Aunt     No Known Problems Daughter        Social History     Occupational History    Not on file   Tobacco Use    Smoking status: Never Smoker    Smokeless tobacco: Never Used   Vaping Use    Vaping Use: Never used   Substance and Sexual Activity    Alcohol use:  Yes     Alcohol/week: 1 0 standard drinks     Types: 1 Cans of beer per week     Comment: rare    Drug use: No    Sexual activity: Yes     Partners: Male       Allergies   Allergen Reactions    Penicillins Anaphylaxis         Current Outpatient Medications:     amLODIPine (NORVASC) 5 mg tablet, Take 1 tablet (5 mg total) by mouth daily, Disp: 30 tablet, Rfl: 5    Ascorbic Acid (VITAMIN C GUMMIE PO), Take 2 gums by mouth daily, Disp: , Rfl:     Aspirin-Acetaminophen-Caffeine (EXCEDRIN PO), Take by mouth as needed, Disp: , Rfl:     Biotin 10 MG CAPS, Take 1 capsule by mouth daily, Disp: , Rfl:     buPROPion (WELLBUTRIN XL) 150 mg 24 hr tablet, Take 150 mg by mouth every morning, Disp: , Rfl:     COLLAGEN PO, Take by mouth daily, Disp: , Rfl:     Cyanocobalamin (VITAMIN B 12 PO), Take by mouth every other day, Disp: , Rfl:     DOCOSAHEXAENOIC ACID PO, Take 1 capsule by mouth, Disp: , Rfl:     ibuprofen (MOTRIN) 600 mg tablet, Take 600 mg by mouth every 6 (six) hours as needed, Disp: , Rfl:     Misc Natural Products (APPLE CIDER VINEGAR DIET PO), Take by mouth daily, Disp: , Rfl:     naproxen (EC NAPROSYN) 500 MG EC tablet, Take 1 tablet (500 mg total) by mouth 2 (two) times a day with meals, Disp: 60 tablet, Rfl: 2    Omega-3 1000 MG CAPS, Take 1 capsule by mouth daily, Disp: , Rfl:     phentermine 37 5 MG capsule, Take 37 5 mg by mouth, Disp: , Rfl:     VITAMIN A PO, Take by mouth every other day, Disp: , Rfl:       Sujey Sprague PA-C    Scribe Attestation    I,:   am acting as a scribe while in the presence of the attending physician :       I,:   personally performed the services described in this documentation    as scribed in my presence :

## 2021-07-20 NOTE — TELEPHONE ENCOUNTER
Dr Major [facility]    [phone]    Patient states that the Naproxen 500 mg EC is too expensive  Can it be called in without EC so it would be cheaper? Please advise

## 2021-07-30 ENCOUNTER — CLINICAL SUPPORT (OUTPATIENT)
Dept: FAMILY MEDICINE CLINIC | Facility: CLINIC | Age: 51
End: 2021-07-30

## 2021-07-30 VITALS — DIASTOLIC BLOOD PRESSURE: 76 MMHG | SYSTOLIC BLOOD PRESSURE: 122 MMHG

## 2021-07-30 DIAGNOSIS — Z01.30 BLOOD PRESSURE CHECK: Primary | ICD-10-CM

## 2021-07-30 NOTE — PROGRESS NOTES
Patient is here for BP check   122/76  Patient has no issues   As per Dr Dario Mascorro no changes of med for pt

## 2021-11-30 ENCOUNTER — OFFICE VISIT (OUTPATIENT)
Dept: OBGYN CLINIC | Facility: MEDICAL CENTER | Age: 51
End: 2021-11-30
Payer: COMMERCIAL

## 2021-11-30 VITALS
DIASTOLIC BLOOD PRESSURE: 82 MMHG | HEART RATE: 88 BPM | BODY MASS INDEX: 33.13 KG/M2 | SYSTOLIC BLOOD PRESSURE: 123 MMHG | WEIGHT: 187 LBS | HEIGHT: 63 IN

## 2021-11-30 DIAGNOSIS — M17.12 PRIMARY OSTEOARTHRITIS OF LEFT KNEE: ICD-10-CM

## 2021-11-30 PROCEDURE — 99213 OFFICE O/P EST LOW 20 MIN: CPT | Performed by: ORTHOPAEDIC SURGERY

## 2021-11-30 PROCEDURE — 1036F TOBACCO NON-USER: CPT | Performed by: ORTHOPAEDIC SURGERY

## 2021-11-30 PROCEDURE — 3008F BODY MASS INDEX DOCD: CPT | Performed by: ORTHOPAEDIC SURGERY

## 2021-11-30 RX ORDER — NAPROXEN 500 MG/1
500 TABLET ORAL 2 TIMES DAILY WITH MEALS
Qty: 60 TABLET | Refills: 2 | Status: SHIPPED | OUTPATIENT
Start: 2021-11-30

## 2021-12-09 ENCOUNTER — OFFICE VISIT (OUTPATIENT)
Dept: PODIATRY | Facility: CLINIC | Age: 51
End: 2021-12-09
Payer: COMMERCIAL

## 2021-12-09 VITALS
DIASTOLIC BLOOD PRESSURE: 85 MMHG | HEART RATE: 106 BPM | HEIGHT: 63 IN | WEIGHT: 186.8 LBS | SYSTOLIC BLOOD PRESSURE: 138 MMHG | BODY MASS INDEX: 33.1 KG/M2

## 2021-12-09 DIAGNOSIS — M20.41 HAMMER TOE OF RIGHT FOOT: ICD-10-CM

## 2021-12-09 DIAGNOSIS — M20.11 HALLUX VALGUS OF RIGHT FOOT: Primary | ICD-10-CM

## 2021-12-09 PROCEDURE — 3008F BODY MASS INDEX DOCD: CPT | Performed by: PODIATRIST

## 2021-12-09 PROCEDURE — 1036F TOBACCO NON-USER: CPT | Performed by: PODIATRIST

## 2021-12-09 PROCEDURE — 99203 OFFICE O/P NEW LOW 30 MIN: CPT | Performed by: PODIATRIST

## 2021-12-09 RX ORDER — TOPIRAMATE 50 MG/1
50 TABLET, FILM COATED ORAL 2 TIMES DAILY
COMMUNITY
Start: 2021-12-03

## 2022-01-13 ENCOUNTER — OFFICE VISIT (OUTPATIENT)
Dept: PODIATRY | Facility: CLINIC | Age: 52
End: 2022-01-13
Payer: COMMERCIAL

## 2022-01-13 VITALS
SYSTOLIC BLOOD PRESSURE: 161 MMHG | BODY MASS INDEX: 32.43 KG/M2 | WEIGHT: 183 LBS | DIASTOLIC BLOOD PRESSURE: 103 MMHG | HEART RATE: 102 BPM | HEIGHT: 63 IN

## 2022-01-13 DIAGNOSIS — M20.11 HALLUX VALGUS OF RIGHT FOOT: Primary | ICD-10-CM

## 2022-01-13 DIAGNOSIS — M20.41 HAMMER TOE OF RIGHT FOOT: ICD-10-CM

## 2022-01-13 PROCEDURE — 3008F BODY MASS INDEX DOCD: CPT | Performed by: PODIATRIST

## 2022-01-13 PROCEDURE — 99212 OFFICE O/P EST SF 10 MIN: CPT | Performed by: PODIATRIST

## 2022-01-13 PROCEDURE — 1036F TOBACCO NON-USER: CPT | Performed by: PODIATRIST

## 2022-01-13 NOTE — PROGRESS NOTES
Patient presents for assessment of right foot  Patient is feeling much more comfortable wearing the Silipos pad on the right 2nd toe  She does not feel that surgery is needed at this time and will contact us if pain increases  A 2 additional pads were dispensed  Reappoint p r n

## 2022-03-03 ENCOUNTER — HOSPITAL ENCOUNTER (OUTPATIENT)
Dept: RADIOLOGY | Facility: HOSPITAL | Age: 52
Discharge: HOME | End: 2022-03-03
Attending: FAMILY MEDICINE
Payer: COMMERCIAL

## 2022-03-03 ENCOUNTER — OFFICE VISIT (OUTPATIENT)
Dept: FAMILY MEDICINE CLINIC | Facility: CLINIC | Age: 52
End: 2022-03-03
Payer: COMMERCIAL

## 2022-03-03 VITALS
WEIGHT: 179 LBS | HEART RATE: 96 BPM | HEIGHT: 63 IN | BODY MASS INDEX: 31.71 KG/M2 | DIASTOLIC BLOOD PRESSURE: 76 MMHG | SYSTOLIC BLOOD PRESSURE: 136 MMHG | OXYGEN SATURATION: 98 % | TEMPERATURE: 97.7 F | RESPIRATION RATE: 17 BRPM

## 2022-03-03 DIAGNOSIS — M79.601 RIGHT ARM PAIN: ICD-10-CM

## 2022-03-03 DIAGNOSIS — M79.601 RIGHT ARM PAIN: Primary | ICD-10-CM

## 2022-03-03 PROCEDURE — 3725F SCREEN DEPRESSION PERFORMED: CPT | Performed by: FAMILY MEDICINE

## 2022-03-03 PROCEDURE — 99213 OFFICE O/P EST LOW 20 MIN: CPT | Performed by: FAMILY MEDICINE

## 2022-03-03 PROCEDURE — 73060 X-RAY EXAM OF HUMERUS: CPT

## 2022-03-03 PROCEDURE — 3008F BODY MASS INDEX DOCD: CPT | Performed by: FAMILY MEDICINE

## 2022-03-03 PROCEDURE — 1036F TOBACCO NON-USER: CPT | Performed by: FAMILY MEDICINE

## 2022-03-03 NOTE — PROGRESS NOTES
Assessment/Plan:    Right arm pain  Will obtain x-ray of right arm to rule out fracture  Advised patient she may continue taking naproxen 500 milligrams twice a day as needed for arm pain that she normally takes for knee pain  If x-rays do show a fracture a, will refer to orthopedics for further management  If negative, will send to physical therapy  Follow-up as needed       Diagnoses and all orders for this visit:    Right arm pain  -     XR humerus right; Future          Subjective:   No chief complaint on file  Health Maintenance   Topic Date Due    HIV Screening  Never done    DTaP,Tdap,and Td Vaccines (1 - Tdap) Never done    Cervical Cancer Screening  12/22/2018    Influenza Vaccine (1) Never done    BMI: Followup Plan  05/28/2022    Annual Physical  05/28/2022    Breast Cancer Screening: Mammogram  07/12/2022    Depression Screening  03/03/2023    BMI: Adult  03/03/2023    Colorectal Cancer Screening  06/26/2030    Hepatitis C Screening  Completed    COVID-19 Vaccine  Completed    Pneumococcal Vaccine: Pediatrics (0 to 5 Years) and At-Risk Patients (6 to 59 Years)  Aged Out    HIB Vaccine  Aged Out    Hepatitis B Vaccine  Aged Out    IPV Vaccine  Aged Out    Hepatitis A Vaccine  Aged Out    Meningococcal ACWY Vaccine  Aged Out    HPV Vaccine  Aged Out        Patient ID: Aaron Pierson is a 45 y o  female  [facility]    [id number]    Patient fell on ice 2 weeks ago and landed on right arm  No otc medications  Continues to have persistent right arm pain  Throbbing  Localized to the right arm without radicular symptoms  Has not really taken any naproxen was prescribed for knee pain previously  No bruising  No numbness or tingling        No similar episodes in the past           The following portions of the patient's history were reviewed and updated as appropriate: allergies, current medications, past family history, past medical history, past social history, past surgical history, and problem list     Review of Systems   Constitutional: Negative for chills and fever  Respiratory: Negative for cough and shortness of breath  Cardiovascular: Negative for chest pain and palpitations  Gastrointestinal: Negative for nausea and vomiting  Musculoskeletal:        Right arm pain   Skin: Negative for wound  Neurological: Negative for dizziness and headaches  Objective:  /76 (BP Location: Left arm, Patient Position: Sitting, Cuff Size: Adult)   Pulse 96   Temp 97 7 °F (36 5 °C) (Tympanic)   Resp 17   Ht 5' 3" (1 6 m)   Wt 81 2 kg (179 lb)   SpO2 98%   BMI 31 71 kg/m²      Physical Exam  Vitals and nursing note reviewed  HENT:      Head: Normocephalic and atraumatic  Eyes:      Conjunctiva/sclera: Conjunctivae normal    Cardiovascular:      Rate and Rhythm: Normal rate and regular rhythm  Pulses: Normal pulses  Heart sounds: No murmur heard  Pulmonary:      Effort: Pulmonary effort is normal  No respiratory distress  Breath sounds: No wheezing, rhonchi or rales  Musculoskeletal:      Comments: Patient has tenderness on palpation of right arm, predominantly middle of shaft  No tenderness on palpation of elbow or shoulder  Able to flex arm up to about 90 degrees of flexion and abducts roughly to 30 degrees before having pain  Neurological:      Mental Status: She is alert  This note has been constructed using a voice recognition system  There may be translation, syntax, or grammatical errors  If you have an questions, please contact the dictating provider

## 2022-03-03 NOTE — ASSESSMENT & PLAN NOTE
Will obtain x-ray of right arm to rule out fracture  Advised patient she may continue taking naproxen 500 milligrams twice a day as needed for arm pain that she normally takes for knee pain  If x-rays do show a fracture a, will refer to orthopedics for further management  If negative, will send to physical therapy      Follow-up as needed

## 2022-05-26 ENCOUNTER — TELEPHONE (OUTPATIENT)
Dept: OBGYN CLINIC | Facility: CLINIC | Age: 52
End: 2022-05-26

## 2022-05-26 DIAGNOSIS — M17.12 PRIMARY OSTEOARTHRITIS OF LEFT KNEE: Primary | ICD-10-CM

## 2022-05-26 RX ORDER — NAPROXEN 500 MG/1
500 TABLET ORAL 2 TIMES DAILY WITH MEALS
Qty: 60 TABLET | Refills: 2 | Status: SHIPPED | OUTPATIENT
Start: 2022-05-26 | End: 2022-08-22

## 2022-05-26 NOTE — TELEPHONE ENCOUNTER
Pt contacted Call Center requested refill of their medication  Medication Name: Naproxen       Dosage of Med: 500 mg       Frequency of Med: 2x a day       Remaining Medication: 4      Pharmacy and Location:  [facility] [address] [facility] [address] [id number]  [phone]    Pt  Preferred Callback Phone Number: [phone]      Thank you

## 2022-06-03 ENCOUNTER — OFFICE VISIT (OUTPATIENT)
Dept: FAMILY MEDICINE CLINIC | Facility: CLINIC | Age: 52
End: 2022-06-03
Payer: COMMERCIAL

## 2022-06-03 VITALS
TEMPERATURE: 98.8 F | RESPIRATION RATE: 18 BRPM | DIASTOLIC BLOOD PRESSURE: 78 MMHG | OXYGEN SATURATION: 98 % | HEART RATE: 103 BPM | SYSTOLIC BLOOD PRESSURE: 120 MMHG | BODY MASS INDEX: 32.25 KG/M2 | HEIGHT: 63 IN | WEIGHT: 182 LBS

## 2022-06-03 DIAGNOSIS — Z13.220 SCREENING FOR HYPERLIPIDEMIA: ICD-10-CM

## 2022-06-03 DIAGNOSIS — I10 ESSENTIAL HYPERTENSION: ICD-10-CM

## 2022-06-03 DIAGNOSIS — Z00.00 ANNUAL PHYSICAL EXAM: Primary | ICD-10-CM

## 2022-06-03 DIAGNOSIS — L30.4 INTERTRIGO: ICD-10-CM

## 2022-06-03 DIAGNOSIS — Z12.39 ENCOUNTER FOR SCREENING FOR MALIGNANT NEOPLASM OF BREAST, UNSPECIFIED SCREENING MODALITY: ICD-10-CM

## 2022-06-03 DIAGNOSIS — Z13.0 SCREENING FOR DEFICIENCY ANEMIA: ICD-10-CM

## 2022-06-03 DIAGNOSIS — Z13.1 SCREENING FOR DIABETES MELLITUS (DM): ICD-10-CM

## 2022-06-03 PROCEDURE — 3008F BODY MASS INDEX DOCD: CPT | Performed by: FAMILY MEDICINE

## 2022-06-03 PROCEDURE — 99396 PREV VISIT EST AGE 40-64: CPT | Performed by: FAMILY MEDICINE

## 2022-06-03 PROCEDURE — 1036F TOBACCO NON-USER: CPT | Performed by: FAMILY MEDICINE

## 2022-06-03 RX ORDER — SULFAMETHOXAZOLE AND TRIMETHOPRIM 800; 160 MG/1; MG/1
1 TABLET ORAL 2 TIMES DAILY
COMMUNITY
Start: 2022-04-23

## 2022-06-03 RX ORDER — VALACYCLOVIR HYDROCHLORIDE 500 MG/1
500 TABLET, FILM COATED ORAL DAILY
COMMUNITY
Start: 2022-04-15

## 2022-06-03 RX ORDER — CLOTRIMAZOLE AND BETAMETHASONE DIPROPIONATE 10; .64 MG/G; MG/G
1 CREAM TOPICAL 2 TIMES DAILY
COMMUNITY
Start: 2022-05-12 | End: 2022-06-03 | Stop reason: SDUPTHER

## 2022-06-03 RX ORDER — CLOTRIMAZOLE AND BETAMETHASONE DIPROPIONATE 10; .64 MG/G; MG/G
1 CREAM TOPICAL 2 TIMES DAILY
Qty: 45 G | Refills: 2 | Status: SHIPPED | OUTPATIENT
Start: 2022-06-03

## 2022-06-03 NOTE — ASSESSMENT & PLAN NOTE
· Dexa 5/27/22 and normal  Menopause was at 43or 35years old  · Colonocopy 6/2020 that did not show precancerous polyps  Due in 2030  · Mammogram due after in July  · She is UTD on pap  Has a gynecologist  Vic Rodriguez  · Blood work ordered  · Fu in 1 year

## 2022-06-03 NOTE — PROGRESS NOTES
[facility] PRACTICE    NAME: Ronald Villegas  AGE: 45 y o  SEX: female  : 1970     DATE: 6/3/2022     Assessment and Plan:     Problem List Items Addressed This Visit        Cardiovascular and Mediastinum    Essential hypertension     Well controlled on amlodipine  Removed from chart  Other    Annual physical exam - Primary     Dexa 21 and normal  Menopause was at 43or 35years old  Colonocopy 2020 that did not show precancerous polyps  Due in   Mammogram due after in July  She is UTD on pap  Has a gynecologist  Laurie Loose  Blood work ordered  Fu in 1 year  Other Visit Diagnoses     Encounter for screening for malignant neoplasm of breast, unspecified screening modality        Relevant Orders    Mammo screening bilateral w 3d & cad    Screening for hyperlipidemia        Relevant Orders    Lipid panel    Lipid panel    Screening for deficiency anemia        Relevant Orders    CBC and differential    Screening for diabetes mellitus (DM)        Relevant Orders    Basic metabolic panel    Comprehensive metabolic panel    Intertrigo        Relevant Medications    clotrimazole-betamethasone (LOTRISONE) 1-0 05 % cream          Immunizations and preventive care screenings were discussed with patient today  Appropriate education was printed on patient's after visit summary  Counseling:  Alcohol/drug use: discussed moderation in alcohol intake, the recommendations for healthy alcohol use, and avoidance of illicit drug use  Dental Health: discussed importance of regular tooth brushing, flossing, and dental visits  Exercise: the importance of regular exercise/physical activity was discussed  Recommend exercise 3-5 times per week for at least 30 minutes  Return in about 1 year (around 6/3/2023)       Chief Complaint:     Chief Complaint   Patient presents with    Physical Exam History of Present Illness:     Adult Annual Physical   Patient here for a comprehensive physical exam  The patient reports no problems  Diet and Physical Activity  Diet/Nutrition: well balanced diet  Exercise: walking  Depression Screening  PHQ-2/9 Depression Screening         General Health  Sleep: sleeps well  Hearing: no issues  Vision: goes for regular eye exams and wears glasses  Dental: regular dental visits  /GYN Health  Last menstrual period: postmenopausal      Review of Systems:     Review of Systems   Constitutional: Negative for fever and unexpected weight change  HENT: Negative for ear pain, sore throat and trouble swallowing  Eyes: Negative for pain and visual disturbance  Respiratory: Negative for cough, chest tightness, shortness of breath and wheezing  Cardiovascular: Negative for chest pain  Gastrointestinal: Negative for abdominal distention, abdominal pain, blood in stool, constipation, diarrhea, nausea and vomiting  Endocrine: Negative for polydipsia and polyuria  Genitourinary: Negative for dysuria and hematuria  Musculoskeletal: Negative for back pain and myalgias  Skin: Negative for rash  Neurological: Negative for syncope and headaches  Psychiatric/Behavioral: Negative for suicidal ideas        Past Medical History:     Past Medical History:   Diagnosis Date    Arthritis     Bacterial vaginosis     Migraine     Obesity     Varicella       Past Surgical History:     Past Surgical History:   Procedure Laterality Date    NO PAST SURGERIES      ME KNEE SCOPE,MED/LAT MENISECTOMY Left 2/12/2020    Procedure: ARTHROSCOPY KNEE partial medial menisectomy;  Surgeon: Aneta Ramirez MD;  Location: [facility];  Service: Orthopedics      Social History:     Social History     Socioeconomic History    Marital status: /Civil Union     Spouse name: None    Number of children: 1    Years of education: None    Highest education level: None Occupational History    None   Tobacco Use    Smoking status: Never Smoker    Smokeless tobacco: Never Used   Vaping Use    Vaping Use: Never used   Substance and Sexual Activity    Alcohol use:  Yes     Alcohol/week: 1 0 standard drink     Types: 1 Cans of beer per week     Comment: rare    Drug use: No    Sexual activity: Yes     Partners: Male   Other Topics Concern    None   Social History Narrative    Uses seatbelts     Social Determinants of Health     Financial Resource Strain: Not on file   Food Insecurity: Not on file   Transportation Needs: Not on file   Physical Activity: Not on file   Stress: Not on file   Social Connections: Not on file   Intimate Partner Violence: Not on file   Housing Stability: Not on file      Family History:     Family History   Problem Relation Age of Onset    Prostate cancer Father     Cancer Father     Hypertension Mother     No Known Problems Sister     No Known Problems Maternal Grandmother     No Known Problems Maternal Grandfather     No Known Problems Paternal Grandmother     No Known Problems Paternal Grandfather     No Known Problems Maternal Aunt     No Known Problems Paternal Aunt     No Known Problems Daughter       Current Medications:     Current Outpatient Medications   Medication Sig Dispense Refill    Ascorbic Acid (VITAMIN C GUMMIE PO) Take 2 gums by mouth daily      Aspirin-Acetaminophen-Caffeine (EXCEDRIN PO) Take by mouth as needed      Biotin 10 MG CAPS Take 1 capsule by mouth daily      buPROPion (WELLBUTRIN XL) 150 mg 24 hr tablet Take 150 mg by mouth every morning      clotrimazole-betamethasone (LOTRISONE) 1-0 05 % cream Apply 1 application topically 2 (two) times a day To affected area 45 g 2    COLLAGEN PO Take by mouth daily      Cyanocobalamin (VITAMIN B 12 PO) Take by mouth every other day      Misc Natural Products (APPLE CIDER VINEGAR DIET PO) Take by mouth daily      naproxen (NAPROSYN) 500 mg tablet Take 1 tablet (500 mg total) by mouth 2 (two) times a day with meals 60 tablet 2    naproxen (NAPROSYN) 500 mg tablet Take 1 tablet (500 mg total) by mouth 2 (two) times a day with meals 60 tablet 2    Omega-3 1000 MG CAPS Take 1 capsule by mouth daily      phentermine 37 5 MG capsule Take 37 5 mg by mouth      sulfamethoxazole-trimethoprim (BACTRIM DS) 800-160 mg per tablet Take 1 tablet by mouth 2 (two) times a day      topiramate (TOPAMAX) 50 MG tablet Take 50 mg by mouth 2 (two) times a day      VITAMIN A PO Take by mouth every other day      DOCOSAHEXAENOIC ACID PO Take 1 capsule by mouth      ibuprofen (MOTRIN) 600 mg tablet Take 600 mg by mouth every 6 (six) hours as needed (Patient not taking: Reported on 6/3/2022)      valACYclovir (VALTREX) 500 mg tablet Take 500 mg by mouth daily       No current facility-administered medications for this visit  Allergies: Allergies   Allergen Reactions    Penicillins Anaphylaxis      Physical Exam:     /78 (BP Location: Left arm, Patient Position: Sitting, Cuff Size: Adult)   Pulse 103   Temp 98 8 °F (37 1 °C) (Tympanic)   Resp 18   Ht 5' 3" (1 6 m)   Wt 82 6 kg (182 lb)   SpO2 98%   BMI 32 24 kg/m²     Physical Exam  Constitutional:       Appearance: She is well-developed  HENT:      Head: Normocephalic and atraumatic  Eyes:      General: No scleral icterus  Conjunctiva/sclera: Conjunctivae normal       Pupils: Pupils are equal, round, and reactive to light  Cardiovascular:      Rate and Rhythm: Normal rate and regular rhythm  Heart sounds: Normal heart sounds  No murmur heard  No friction rub  No gallop  Pulmonary:      Effort: Pulmonary effort is normal  No respiratory distress  Breath sounds: No wheezing or rales  Chest:      Chest wall: No tenderness  Abdominal:      General: Bowel sounds are normal  There is no distension  Palpations: Abdomen is soft  There is no mass  Tenderness: There is no abdominal tenderness  Musculoskeletal:         General: Normal range of motion  Cervical back: Normal range of motion and neck supple  Lymphadenopathy:      Cervical: No cervical adenopathy  Skin:     General: Skin is warm and dry  Capillary Refill: Capillary refill takes less than 2 seconds  Findings: No rash  Neurological:      Mental Status: She is alert and oriented to person, place, and time  Cranial Nerves: No cranial nerve deficit            Radha Gentile MD   [address] [place] [address]

## 2022-07-15 ENCOUNTER — HOSPITAL ENCOUNTER (OUTPATIENT)
Dept: RADIOLOGY | Age: 52
Discharge: HOME | End: 2022-07-15
Payer: COMMERCIAL

## 2022-07-15 VITALS — BODY MASS INDEX: 32.25 KG/M2 | HEIGHT: 63 IN | WEIGHT: 182 LBS

## 2022-07-15 DIAGNOSIS — Z12.39 ENCOUNTER FOR SCREENING FOR MALIGNANT NEOPLASM OF BREAST, UNSPECIFIED SCREENING MODALITY: ICD-10-CM

## 2022-07-15 DIAGNOSIS — Z12.31 ENCOUNTER FOR SCREENING MAMMOGRAM FOR MALIGNANT NEOPLASM OF BREAST: ICD-10-CM

## 2022-07-15 PROCEDURE — 77067 SCR MAMMO BI INCL CAD: CPT

## 2022-07-15 PROCEDURE — 77063 BREAST TOMOSYNTHESIS BI: CPT

## 2022-08-22 DIAGNOSIS — M17.12 PRIMARY OSTEOARTHRITIS OF LEFT KNEE: ICD-10-CM

## 2022-08-22 RX ORDER — NAPROXEN 500 MG/1
TABLET ORAL
Qty: 60 TABLET | Refills: 2 | Status: SHIPPED | OUTPATIENT
Start: 2022-08-22

## 2022-10-06 ENCOUNTER — OFFICE VISIT (OUTPATIENT)
Dept: PODIATRY | Facility: CLINIC | Age: 52
End: 2022-10-06
Payer: COMMERCIAL

## 2022-10-06 VITALS
HEIGHT: 63 IN | SYSTOLIC BLOOD PRESSURE: 169 MMHG | HEART RATE: 80 BPM | BODY MASS INDEX: 32.24 KG/M2 | DIASTOLIC BLOOD PRESSURE: 94 MMHG

## 2022-10-06 DIAGNOSIS — M20.12 HALLUX VALGUS OF LEFT FOOT: Primary | ICD-10-CM

## 2022-10-06 DIAGNOSIS — M20.41 HAMMER TOE OF RIGHT FOOT: ICD-10-CM

## 2022-10-06 DIAGNOSIS — M21.622 TAILOR'S BUNIONETTE, LEFT: ICD-10-CM

## 2022-10-06 DIAGNOSIS — M21.621 TAILOR'S BUNIONETTE, RIGHT: ICD-10-CM

## 2022-10-06 DIAGNOSIS — M20.11 HALLUX VALGUS OF RIGHT FOOT: ICD-10-CM

## 2022-10-06 PROCEDURE — 99213 OFFICE O/P EST LOW 20 MIN: CPT | Performed by: PODIATRIST

## 2022-10-06 RX ORDER — CHLORHEXIDINE GLUCONATE 0.12 MG/ML
15 RINSE ORAL ONCE
OUTPATIENT
Start: 2022-10-06 | End: 2022-10-06

## 2022-10-06 NOTE — PROGRESS NOTES
Assessment/Plan:    I personally reviewed x-rays of the left foot  They reveal a hallux valgus deformity and a tailor's bunion deformity left foot  Minimal 5th ray flaring present  Discussed treatment options  Unfortunately, surgical intervention is needed both feet  Recommended offset V bunionectomy bilateral   Tailor's bunionectomy bilateral and hammertoe correction right 2nd toe with pinning  Procedure will be performed in December of 2022  Ideally patient will be off from work for 2 months  She will be rescheduled for consent    No problem-specific Assessment & Plan notes found for this encounter  Diagnoses and all orders for this visit:    Hallux valgus of left foot  -     X-ray foot left 3+ views; Future    Tailor's bunionette, left  -     X-ray foot left 3+ views; Future    Hallux valgus of right foot    Hammer toe of right foot          Subjective:      Patient ID: Lisa Shaw is a 47 y o  female  HPI     Patient presents in severe pain both feet  Patient states that she has severe pain due to the bunion deformity right foot, hammertoe deformity right 2nd toe and tailor's bunion right foot  She also has a painful left foot bunion and left foot tailor's bunion  A blood blister is noted on the left great toe  Patient has been padding her feet as much as possible as she has to wear steel-toed work boots  Unfortunately, she has severe pain  Patient speaks primarily [address] [place] that as an   The following portions of the patient's history were reviewed and updated as appropriate: allergies, current medications, past family history, past medical history, past social history, past surgical history and problem list     Review of Systems   Musculoskeletal: Positive for arthralgias and gait problem  Psychiatric/Behavioral: Negative                Objective:      /94   Pulse 80   Ht 5' 3" (1 6 m)   BMI 32 24 kg/m²          Physical Exam  Constitutional: Appearance: Normal appearance  Cardiovascular:      Pulses: Normal pulses  Musculoskeletal:         General: Deformity present  Comments: Hallux valgus deformity bilateral   Tailor's bunions bilateral   Hammertoe right 2nd toe  Right great toe is encroaching on 2nd toe  Skin:     Comments: Blood blister noted left hallux  It is along the medial aspect of the digit and only 1 cm in diameter  Neurological:      General: No focal deficit present  Mental Status: She is oriented to person, place, and time

## 2022-11-10 ENCOUNTER — OFFICE VISIT (OUTPATIENT)
Dept: PODIATRY | Facility: CLINIC | Age: 52
End: 2022-11-10

## 2022-11-10 VITALS
SYSTOLIC BLOOD PRESSURE: 145 MMHG | DIASTOLIC BLOOD PRESSURE: 83 MMHG | HEIGHT: 63 IN | HEART RATE: 88 BPM | BODY MASS INDEX: 32.24 KG/M2

## 2022-11-10 DIAGNOSIS — M20.12 HALLUX VALGUS OF LEFT FOOT: Primary | ICD-10-CM

## 2022-11-10 DIAGNOSIS — M20.41 HAMMER TOE OF RIGHT FOOT: ICD-10-CM

## 2022-11-10 DIAGNOSIS — M21.621 TAILOR'S BUNIONETTE, RIGHT: ICD-10-CM

## 2022-11-10 DIAGNOSIS — M21.622 TAILOR'S BUNIONETTE, LEFT: ICD-10-CM

## 2022-11-10 DIAGNOSIS — M20.11 HALLUX VALGUS OF RIGHT FOOT: ICD-10-CM

## 2022-11-10 RX ORDER — CLINDAMYCIN PHOSPHATE 10 MG/G
GEL TOPICAL
COMMUNITY
Start: 2022-11-09

## 2022-11-10 NOTE — PROGRESS NOTES
Patient, a Irish-speaking female presents for consent signing  Selina Mays acted as   Patient is scheduled for an Kirk bunionectomy bilateral; tailor's bunionectomy bilateral and hammertoe correction right 2nd toe with pinning on December 23, 2022 at [facility]  Procedures were explained including pre and postoperative course, risks and potential complications  Consent form was signed  Patient notes that she will need to be out of work for approximately 2 months  Your COVID test is positive  Remain in quarantine for 5 days  Drink plenty of fluids and rest  Take Tylenol as needed for aches/fevers  Use Flonase or Mucinex as needed for congestion  Use honey for your cough  Take a multivitamin daily  Please follow-up with your family doctor  Return to the ER with any worsening symptoms or trouble breathing

## 2022-11-15 DIAGNOSIS — M17.12 PRIMARY OSTEOARTHRITIS OF LEFT KNEE: ICD-10-CM

## 2022-11-15 RX ORDER — NAPROXEN 500 MG/1
TABLET ORAL
Qty: 60 TABLET | Refills: 2 | Status: SHIPPED | OUTPATIENT
Start: 2022-11-15

## 2022-12-09 ENCOUNTER — OFFICE VISIT (OUTPATIENT)
Dept: FAMILY MEDICINE CLINIC | Facility: CLINIC | Age: 52
End: 2022-12-09

## 2022-12-09 VITALS
WEIGHT: 191.4 LBS | OXYGEN SATURATION: 97 % | SYSTOLIC BLOOD PRESSURE: 142 MMHG | BODY MASS INDEX: 33.91 KG/M2 | HEART RATE: 95 BPM | HEIGHT: 63 IN | TEMPERATURE: 98.7 F | DIASTOLIC BLOOD PRESSURE: 70 MMHG

## 2022-12-09 DIAGNOSIS — Z01.810 PREOP CARDIOVASCULAR EXAM: Primary | ICD-10-CM

## 2022-12-09 NOTE — PROGRESS NOTES
[facility] PRE-OPERATIVE EVALUATION  [facility] PRACTICE    NAME: Rene Cardona  AGE: 47 y o  SEX: female  : 1970     DATE: 2022    [facility] Pre-Operative Evaluation      Chief Complaint: Pre-operative Evaluation     Surgery: Bunionectomy  Anticipated Date of Surgery: 2022     History of Present Illness:     Patient has no prior history of bleeding issues or blood clots  Chronic conditions, medications and allergies were reviewed  There is no currently active infectious process  Assessment of Cardiac Risk:  No unstable or severe angina or MI in the last 6 weeks or history of stent placement in the last year   No decompensated heart failure (e g  New onset heart failure, NYHA functional class IV heart failure, or worsening existing heart failure) in past 3 mos  No severe heart valve disease including aortic stenosis or symptomatic mitral stenosis     Exercise Capacity:  Able to walk 4 blocks without symptoms  Able to walk 2 flights without symptoms    NO Prior Anesthesia Reactions       No prolonged steroid use in past 6 mos  P A T  If done reviewed  Reminded to have blood work done  EKG NSR, no ischemic changes  Review of Systems:     Review of Systems   Constitutional: Negative for fever and unexpected weight change  HENT: Negative for ear pain, sore throat and trouble swallowing  Eyes: Negative for pain and visual disturbance  Respiratory: Negative for cough, chest tightness, shortness of breath and wheezing  Cardiovascular: Negative for chest pain  Gastrointestinal: Negative for abdominal distention, abdominal pain, blood in stool, constipation, diarrhea, nausea and vomiting  Endocrine: Negative for polydipsia and polyuria  Genitourinary: Negative for dysuria and hematuria  Musculoskeletal: Negative for back pain and myalgias  Skin: Negative for rash     Neurological: Negative for syncope and headaches  Psychiatric/Behavioral: Negative for suicidal ideas  Current Problem List:     Patient Active Problem List   Diagnosis   • Tricompartment osteoarthritis of right knee   • Annual physical exam   • Chronic pain of left knee   • Complex tear of medial meniscus of left knee as current injury   • Osteoarthritis of left knee   • Patellofemoral disorder of left knee   • HPV in female   • Essential hypertension   • Right arm pain       Allergies:      Allergies   Allergen Reactions   • Penicillins Anaphylaxis       Current Medications:       Current Outpatient Medications:   •  Ascorbic Acid (VITAMIN C GUMMIE PO), Take 2 gums by mouth daily, Disp: , Rfl:   •  Aspirin-Acetaminophen-Caffeine (EXCEDRIN PO), Take by mouth as needed, Disp: , Rfl:   •  Biotin 10 MG CAPS, Take 1 capsule by mouth daily, Disp: , Rfl:   •  buPROPion (WELLBUTRIN XL) 150 mg 24 hr tablet, Take 150 mg by mouth every morning, Disp: , Rfl:   •  clindamycin (CLINDAGEL) 1 % gel, , Disp: , Rfl:   •  clotrimazole-betamethasone (LOTRISONE) 1-0 05 % cream, Apply 1 application topically 2 (two) times a day To affected area, Disp: 45 g, Rfl: 2  •  COLLAGEN PO, Take by mouth daily, Disp: , Rfl:   •  Cyanocobalamin (VITAMIN B 12 PO), Take by mouth every other day, Disp: , Rfl:   •  Misc Natural Products (APPLE CIDER VINEGAR DIET PO), Take by mouth daily, Disp: , Rfl:   •  naproxen (NAPROSYN) 500 mg tablet, Take 1 tablet (500 mg total) by mouth 2 (two) times a day with meals, Disp: 60 tablet, Rfl: 2  •  Omega-3 1000 MG CAPS, Take 1 capsule by mouth daily, Disp: , Rfl:   •  phentermine 37 5 MG capsule, Take 37 5 mg by mouth, Disp: , Rfl:   •  sulfamethoxazole-trimethoprim (BACTRIM DS) 800-160 mg per tablet, Take 1 tablet by mouth 2 (two) times a day, Disp: , Rfl:   •  topiramate (TOPAMAX) 50 MG tablet, Take 50 mg by mouth 2 (two) times a day, Disp: , Rfl:   •  valACYclovir (VALTREX) 500 mg tablet, Take 500 mg by mouth daily, Disp: , Rfl:   •  VITAMIN A PO, Take by mouth every other day, Disp: , Rfl:   •  DOCOSAHEXAENOIC ACID PO, Take 1 capsule by mouth (Patient not taking: Reported on 12/9/2022), Disp: , Rfl:   •  ibuprofen (MOTRIN) 600 mg tablet, Take 600 mg by mouth every 6 (six) hours as needed (Patient not taking: Reported on 6/3/2022), Disp: , Rfl:   •  naproxen (NAPROSYN) 500 mg tablet, TAKE 1 TABLET BY MOUTH TWICE A DAY WITH MEALS, Disp: 60 tablet, Rfl: 2    Past Medical History:       Past Medical History:   Diagnosis Date   • Arthritis    • Bacterial vaginosis    • Migraine    • Obesity    • Varicella         Past Surgical History:   Procedure Laterality Date   • NO PAST SURGERIES     • FL KNEE SCOPE,MED/LAT MENISECTOMY Left 2/12/2020    Procedure: ARTHROSCOPY KNEE partial medial menisectomy;  Surgeon: Delfino Trevino MD;  Location[facility];  Service: Orthopedics        Family History   Problem Relation Age of Onset   • Hypertension Mother    • Prostate cancer Father         unknown age   • Cancer Father    • No Known Problems Sister    • No Known Problems Daughter    • No Known Problems Maternal Grandmother    • No Known Problems Maternal Grandfather    • No Known Problems Paternal Grandmother    • No Known Problems Paternal Grandfather    • No Known Problems Maternal Aunt    • No Known Problems Paternal Aunt         Social History     Socioeconomic History   • Marital status: /Civil Union     Spouse name: Not on file   • Number of children: 1   • Years of education: Not on file   • Highest education level: Not on file   Occupational History   • Not on file   Tobacco Use   • Smoking status: Never   • Smokeless tobacco: Never   Vaping Use   • Vaping Use: Never used   Substance and Sexual Activity   • Alcohol use:  Yes     Alcohol/week: 1 0 standard drink     Types: 1 Cans of beer per week     Comment: rare   • Drug use: No   • Sexual activity: Yes     Partners: Male   Other Topics Concern   • Not on file   Social History Narrative    Uses seatbelts     Social Determinants of Health     Financial Resource Strain: Not on file   Food Insecurity: Not on file   Transportation Needs: Not on file   Physical Activity: Not on file   Stress: Not on file   Social Connections: Not on file   Intimate Partner Violence: Not on file   Housing Stability: Not on file        Physical Exam:     /70 (BP Location: Left arm, Patient Position: Sitting, Cuff Size: Adult)   Pulse 95   Temp 98 7 °F (37 1 °C) (Tympanic)   Ht 5' 3" (1 6 m)   Wt 86 8 kg (191 lb 6 4 oz)   SpO2 97%   BMI 33 90 kg/m²     Physical Exam  Constitutional:       Appearance: She is well-developed  HENT:      Head: Normocephalic and atraumatic  Right Ear: External ear normal       Left Ear: External ear normal       Mouth/Throat:      Pharynx: No oropharyngeal exudate  Eyes:      General: No scleral icterus  Conjunctiva/sclera: Conjunctivae normal       Pupils: Pupils are equal, round, and reactive to light  Cardiovascular:      Rate and Rhythm: Normal rate and regular rhythm  Heart sounds: No murmur heard  No friction rub  No gallop  Pulmonary:      Effort: Pulmonary effort is normal  No respiratory distress  Breath sounds: Normal breath sounds  No wheezing or rales  Abdominal:      General: Bowel sounds are normal  There is no distension  Palpations: Abdomen is soft  There is no mass  Tenderness: There is no abdominal tenderness  There is no rebound  Musculoskeletal:         General: Normal range of motion  Cervical back: Normal range of motion and neck supple  Skin:     General: Skin is warm and dry  Neurological:      Mental Status: She is alert and oriented to person, place, and time  Assessment & Recommendations:     Patient is cleared for surgery as detailed above  Surgeon recommends blood work and an EKG  EKG normal today  Blood work pending   Unless there is very high sugars or severe anemia with a hg<10, she will be able to proceed with surgery  Surgical Procedure risk category: low risk    Patient specific operative risk categegory: low risk    She will continue Wellbutrin and Topamax with small sips of water the day of surgery  Problem List Items Addressed This Visit    None  Visit Diagnoses     Preop cardiovascular exam    -  Primary    Cleared for surgery  Note forwarded to Dr Claudia Houser       Relevant Orders    POCT ECG (Completed)

## 2022-12-09 NOTE — LETTER
2022     Noemi Mcintosh, [facility] [address]    Patient: Dilma Funes   YOB: 1970   Date of Visit: 2022       Dear Dr Tuttle Postin: Thank you for referring Dilma Funes to me for evaluation  Below are my notes for this consultation  If you have questions, please do not hesitate to call me  I look forward to following your patient along with you  Sincerely,        Inez Hazel MD        CC: No Recipients  Inez Hazel MD  2022 12:45 PM  Sign when Signing Visit  Andrew Blanchard    NAME: Dilma uFnes  AGE: 47 y o  SEX: female  : 1970     DATE: 2022    [facility] Pre-Operative Evaluation      Chief Complaint: Pre-operative Evaluation     Surgery: Bunionectomy  Anticipated Date of Surgery: 2022     History of Present Illness:     Patient has no prior history of bleeding issues or blood clots  Chronic conditions, medications and allergies were reviewed  There is no currently active infectious process  Assessment of Cardiac Risk:  · No unstable or severe angina or MI in the last 6 weeks or history of stent placement in the last year   · No decompensated heart failure (e g  New onset heart failure, NYHA functional class IV heart failure, or worsening existing heart failure) in past 3 mos  · No severe heart valve disease including aortic stenosis or symptomatic mitral stenosis     Exercise Capacity:  · Able to walk 4 blocks without symptoms  · Able to walk 2 flights without symptoms    NO Prior Anesthesia Reactions       No prolonged steroid use in past 6 mos  P A T  If done reviewed  Reminded to have blood work done  EKG NSR, no ischemic changes  Review of Systems:     Review of Systems   Constitutional: Negative for fever and unexpected weight change     HENT: Negative for ear pain, sore throat and trouble swallowing  Eyes: Negative for pain and visual disturbance  Respiratory: Negative for cough, chest tightness, shortness of breath and wheezing  Cardiovascular: Negative for chest pain  Gastrointestinal: Negative for abdominal distention, abdominal pain, blood in stool, constipation, diarrhea, nausea and vomiting  Endocrine: Negative for polydipsia and polyuria  Genitourinary: Negative for dysuria and hematuria  Musculoskeletal: Negative for back pain and myalgias  Skin: Negative for rash  Neurological: Negative for syncope and headaches  Psychiatric/Behavioral: Negative for suicidal ideas  Current Problem List:     Patient Active Problem List   Diagnosis   • Tricompartment osteoarthritis of right knee   • Annual physical exam   • Chronic pain of left knee   • Complex tear of medial meniscus of left knee as current injury   • Osteoarthritis of left knee   • Patellofemoral disorder of left knee   • HPV in female   • Essential hypertension   • Right arm pain       Allergies:      Allergies   Allergen Reactions   • Penicillins Anaphylaxis       Current Medications:       Current Outpatient Medications:   •  Ascorbic Acid (VITAMIN C GUMMIE PO), Take 2 gums by mouth daily, Disp: , Rfl:   •  Aspirin-Acetaminophen-Caffeine (EXCEDRIN PO), Take by mouth as needed, Disp: , Rfl:   •  Biotin 10 MG CAPS, Take 1 capsule by mouth daily, Disp: , Rfl:   •  buPROPion (WELLBUTRIN XL) 150 mg 24 hr tablet, Take 150 mg by mouth every morning, Disp: , Rfl:   •  clindamycin (CLINDAGEL) 1 % gel, , Disp: , Rfl:   •  clotrimazole-betamethasone (LOTRISONE) 1-0 05 % cream, Apply 1 application topically 2 (two) times a day To affected area, Disp: 45 g, Rfl: 2  •  COLLAGEN PO, Take by mouth daily, Disp: , Rfl:   •  Cyanocobalamin (VITAMIN B 12 PO), Take by mouth every other day, Disp: , Rfl:   •  Misc Natural Products (APPLE CIDER VINEGAR DIET PO), Take by mouth daily, Disp: , Rfl:   •  naproxen (NAPROSYN) 500 mg tablet, Take 1 tablet (500 mg total) by mouth 2 (two) times a day with meals, Disp: 60 tablet, Rfl: 2  •  Omega-3 1000 MG CAPS, Take 1 capsule by mouth daily, Disp: , Rfl:   •  phentermine 37 5 MG capsule, Take 37 5 mg by mouth, Disp: , Rfl:   •  sulfamethoxazole-trimethoprim (BACTRIM DS) 800-160 mg per tablet, Take 1 tablet by mouth 2 (two) times a day, Disp: , Rfl:   •  topiramate (TOPAMAX) 50 MG tablet, Take 50 mg by mouth 2 (two) times a day, Disp: , Rfl:   •  valACYclovir (VALTREX) 500 mg tablet, Take 500 mg by mouth daily, Disp: , Rfl:   •  VITAMIN A PO, Take by mouth every other day, Disp: , Rfl:   •  DOCOSAHEXAENOIC ACID PO, Take 1 capsule by mouth (Patient not taking: Reported on 12/9/2022), Disp: , Rfl:   •  ibuprofen (MOTRIN) 600 mg tablet, Take 600 mg by mouth every 6 (six) hours as needed (Patient not taking: Reported on 6/3/2022), Disp: , Rfl:   •  naproxen (NAPROSYN) 500 mg tablet, TAKE 1 TABLET BY MOUTH TWICE A DAY WITH MEALS, Disp: 60 tablet, Rfl: 2    Past Medical History:       Past Medical History:   Diagnosis Date   • Arthritis    • Bacterial vaginosis    • Migraine    • Obesity    • Varicella         Past Surgical History:   Procedure Laterality Date   • NO PAST SURGERIES     • FL KNEE SCOPE,MED/LAT MENISECTOMY Left 2/12/2020    Procedure: ARTHROSCOPY KNEE partial medial menisectomy;  Surgeon: Hermelindo Caro MD;  Location: [facility];  Service: Orthopedics        Family History   Problem Relation Age of Onset   • Hypertension Mother    • Prostate cancer Father         unknown age   • Cancer Father    • No Known Problems Sister    • No Known Problems Daughter    • No Known Problems Maternal Grandmother    • No Known Problems Maternal Grandfather    • No Known Problems Paternal Grandmother    • No Known Problems Paternal Grandfather    • No Known Problems Maternal Aunt    • No Known Problems Paternal Aunt         Social History     Socioeconomic History   • Marital status: /Civil [employer]     Spouse name: Not on file   • Number of children: 1   • Years of education: Not on file   • Highest education level: Not on file   Occupational History   • Not on file   Tobacco Use   • Smoking status: Never   • Smokeless tobacco: Never   Vaping Use   • Vaping Use: Never used   Substance and Sexual Activity   • Alcohol use: Yes     Alcohol/week: 1 0 standard drink     Types: 1 Cans of beer per week     Comment: rare   • Drug use: No   • Sexual activity: Yes     Partners: Male   Other Topics Concern   • Not on file   Social History Narrative    Uses seatbelts     Social Determinants of Health     Financial Resource Strain: Not on file   Food Insecurity: Not on file   Transportation Needs: Not on file   Physical Activity: Not on file   Stress: Not on file   Social Connections: Not on file   Intimate Partner Violence: Not on file   Housing Stability: Not on file        Physical Exam:     /70 (BP Location: Left arm, Patient Position: Sitting, Cuff Size: Adult)   Pulse 95   Temp 98 7 °F (37 1 °C) (Tympanic)   Ht 5' 3" (1 6 m)   Wt 86 8 kg (191 lb 6 4 oz)   SpO2 97%   BMI 33 90 kg/m²     Physical Exam  Constitutional:       Appearance: She is well-developed  HENT:      Head: Normocephalic and atraumatic  Right Ear: External ear normal       Left Ear: External ear normal       Mouth/Throat:      Pharynx: No oropharyngeal exudate  Eyes:      General: No scleral icterus  Conjunctiva/sclera: Conjunctivae normal       Pupils: Pupils are equal, round, and reactive to light  Cardiovascular:      Rate and Rhythm: Normal rate and regular rhythm  Heart sounds: No murmur heard  No friction rub  No gallop  Pulmonary:      Effort: Pulmonary effort is normal  No respiratory distress  Breath sounds: Normal breath sounds  No wheezing or rales  Abdominal:      General: Bowel sounds are normal  There is no distension  Palpations: Abdomen is soft  There is no mass  Tenderness: There is no abdominal tenderness  There is no rebound  Musculoskeletal:         General: Normal range of motion  Cervical back: Normal range of motion and neck supple  Skin:     General: Skin is warm and dry  Neurological:      Mental Status: She is alert and oriented to person, place, and time  Assessment & Recommendations:     Patient is cleared for surgery as detailed above  Surgeon recommends blood work and an EKG  EKG normal today  Blood work pending  Unless there is very high sugars or severe anemia with a hg<10, she will be able to proceed with surgery  Surgical Procedure risk category: low risk    Patient specific operative risk categegory: low risk    She will continue Wellbutrin and Topamax with small sips of water the day of surgery

## 2022-12-10 ENCOUNTER — LAB (OUTPATIENT)
Dept: LAB | Facility: MEDICAL CENTER | Age: 52
End: 2022-12-10

## 2022-12-10 DIAGNOSIS — Z13.1 SCREENING FOR DIABETES MELLITUS (DM): ICD-10-CM

## 2022-12-10 DIAGNOSIS — Z13.220 SCREENING FOR HYPERLIPIDEMIA: ICD-10-CM

## 2022-12-10 LAB
ALBUMIN SERPL BCP-MCNC: 3.7 G/DL (ref 3.5–5)
ALP SERPL-CCNC: 126 U/L (ref 46–116)
ALT SERPL W P-5'-P-CCNC: 28 U/L (ref 12–78)
ANION GAP SERPL CALCULATED.3IONS-SCNC: 8 MMOL/L (ref 4–13)
AST SERPL W P-5'-P-CCNC: 20 U/L (ref 5–45)
BILIRUB SERPL-MCNC: 0.44 MG/DL (ref 0.2–1)
BUN SERPL-MCNC: 19 MG/DL (ref 5–25)
CALCIUM SERPL-MCNC: 9.7 MG/DL (ref 8.3–10.1)
CHLORIDE SERPL-SCNC: 108 MMOL/L (ref 96–108)
CHOLEST SERPL-MCNC: 211 MG/DL
CO2 SERPL-SCNC: 25 MMOL/L (ref 21–32)
CREAT SERPL-MCNC: 0.64 MG/DL (ref 0.6–1.3)
GFR SERPL CREATININE-BSD FRML MDRD: 102 ML/MIN/1.73SQ M
GLUCOSE P FAST SERPL-MCNC: 98 MG/DL (ref 65–99)
HDLC SERPL-MCNC: 56 MG/DL
LDLC SERPL CALC-MCNC: 134 MG/DL (ref 0–100)
NONHDLC SERPL-MCNC: 155 MG/DL
POTASSIUM SERPL-SCNC: 4.1 MMOL/L (ref 3.5–5.3)
PROT SERPL-MCNC: 8.3 G/DL (ref 6.4–8.4)
SODIUM SERPL-SCNC: 141 MMOL/L (ref 135–147)
TRIGL SERPL-MCNC: 103 MG/DL

## 2022-12-20 NOTE — PRE-PROCEDURE INSTRUCTIONS
Pre-Surgery Instructions:   Medication Instructions   • Ascorbic Acid (VITAMIN C GUMMIE PO) Stop taking 3 days prior to surgery  • Aspirin-Acetaminophen-Caffeine (EXCEDRIN PO) Instructed to stop for surgery   • Biotin 10 MG CAPS Stop taking 3 days prior to surgery  • clindamycin (CLINDAGEL) 1 % gel Hold day of surgery  • clotrimazole-betamethasone (LOTRISONE) 1-0 05 % cream Hold day of surgery  • COLLAGEN PO Stop taking 3 days prior to surgery  • Cyanocobalamin (VITAMIN B 12 PO) Stop taking 3 days prior to surgery  • Misc Natural Products (APPLE CIDER VINEGAR DIET PO) Stop taking 3 days prior to surgery  • naproxen (NAPROSYN) 500 mg tablet Stop taking 3 days prior to surgery  • Omega-3 1000 MG CAPS Stop taking 3 days prior to surgery  • sulfamethoxazole-trimethoprim (BACTRIM DS) 800-160 mg per tablet Hold day of surgery  • valACYclovir (VALTREX) 500 mg tablet Take day of surgery  • VITAMIN A PO Stop taking 3 days prior to surgery  • VITAMIN D PO Stop taking 3 days prior to surgery     See above    Pt was instructed to stop all vitamins, supplements, NSAIDS, aspirin until after surgery- only take tylenol prn pain

## 2022-12-23 ENCOUNTER — ANESTHESIA (OUTPATIENT)
Dept: PERIOP | Facility: HOSPITAL | Age: 52
End: 2022-12-23

## 2022-12-23 ENCOUNTER — HOSPITAL ENCOUNTER (OUTPATIENT)
Facility: HOSPITAL | Age: 52
Setting detail: OUTPATIENT SURGERY
Discharge: HOME | End: 2022-12-23
Attending: PODIATRIST | Admitting: PODIATRIST

## 2022-12-23 ENCOUNTER — APPOINTMENT (OUTPATIENT)
Dept: RADIOLOGY | Facility: HOSPITAL | Age: 52
End: 2022-12-23

## 2022-12-23 ENCOUNTER — ANESTHESIA EVENT (OUTPATIENT)
Dept: PERIOP | Facility: HOSPITAL | Age: 52
End: 2022-12-23

## 2022-12-23 VITALS
BODY MASS INDEX: 32.78 KG/M2 | TEMPERATURE: 99.4 F | HEIGHT: 63 IN | RESPIRATION RATE: 18 BRPM | HEART RATE: 80 BPM | OXYGEN SATURATION: 97 % | WEIGHT: 185 LBS | DIASTOLIC BLOOD PRESSURE: 74 MMHG | SYSTOLIC BLOOD PRESSURE: 136 MMHG

## 2022-12-23 DIAGNOSIS — Z98.890 POST-OPERATIVE STATE: Primary | ICD-10-CM

## 2022-12-23 DEVICE — SCREW COMP 2.5 X 14MM MICRO FT: Type: IMPLANTABLE DEVICE | Site: FOOT | Status: FUNCTIONAL

## 2022-12-23 RX ORDER — OXYCODONE HYDROCHLORIDE AND ACETAMINOPHEN 5; 325 MG/1; MG/1
1 TABLET ORAL EVERY 4 HOURS PRN
Qty: 40 TABLET | Refills: 0 | Status: SHIPPED | OUTPATIENT
Start: 2022-12-23 | End: 2023-01-02

## 2022-12-23 RX ORDER — KETOROLAC TROMETHAMINE 30 MG/ML
INJECTION, SOLUTION INTRAMUSCULAR; INTRAVENOUS AS NEEDED
Status: DISCONTINUED | OUTPATIENT
Start: 2022-12-23 | End: 2022-12-23

## 2022-12-23 RX ORDER — MAGNESIUM HYDROXIDE 1200 MG/15ML
LIQUID ORAL AS NEEDED
Status: DISCONTINUED | OUTPATIENT
Start: 2022-12-23 | End: 2022-12-23 | Stop reason: HOSPADM

## 2022-12-23 RX ORDER — OXYCODONE HYDROCHLORIDE AND ACETAMINOPHEN 5; 325 MG/1; MG/1
1 TABLET ORAL EVERY 4 HOURS PRN
Status: DISCONTINUED | OUTPATIENT
Start: 2022-12-23 | End: 2022-12-23 | Stop reason: HOSPADM

## 2022-12-23 RX ORDER — PROPOFOL 10 MG/ML
INJECTION, EMULSION INTRAVENOUS AS NEEDED
Status: DISCONTINUED | OUTPATIENT
Start: 2022-12-23 | End: 2022-12-23

## 2022-12-23 RX ORDER — ONDANSETRON 2 MG/ML
4 INJECTION INTRAMUSCULAR; INTRAVENOUS ONCE AS NEEDED
Status: DISCONTINUED | OUTPATIENT
Start: 2022-12-23 | End: 2022-12-23 | Stop reason: HOSPADM

## 2022-12-23 RX ORDER — CEFAZOLIN SODIUM 2 G/50ML
2000 SOLUTION INTRAVENOUS ONCE
Status: DISCONTINUED | OUTPATIENT
Start: 2022-12-23 | End: 2022-12-23

## 2022-12-23 RX ORDER — ONDANSETRON 2 MG/ML
INJECTION INTRAMUSCULAR; INTRAVENOUS AS NEEDED
Status: DISCONTINUED | OUTPATIENT
Start: 2022-12-23 | End: 2022-12-23

## 2022-12-23 RX ORDER — SODIUM CHLORIDE, SODIUM LACTATE, POTASSIUM CHLORIDE, CALCIUM CHLORIDE 600; 310; 30; 20 MG/100ML; MG/100ML; MG/100ML; MG/100ML
100 INJECTION, SOLUTION INTRAVENOUS CONTINUOUS
Status: DISCONTINUED | OUTPATIENT
Start: 2022-12-23 | End: 2022-12-23 | Stop reason: HOSPADM

## 2022-12-23 RX ORDER — FENTANYL CITRATE/PF 50 MCG/ML
50 SYRINGE (ML) INJECTION
Status: DISCONTINUED | OUTPATIENT
Start: 2022-12-23 | End: 2022-12-23 | Stop reason: HOSPADM

## 2022-12-23 RX ORDER — MIDAZOLAM HYDROCHLORIDE 2 MG/2ML
INJECTION, SOLUTION INTRAMUSCULAR; INTRAVENOUS AS NEEDED
Status: DISCONTINUED | OUTPATIENT
Start: 2022-12-23 | End: 2022-12-23

## 2022-12-23 RX ORDER — SODIUM CHLORIDE, SODIUM LACTATE, POTASSIUM CHLORIDE, CALCIUM CHLORIDE 600; 310; 30; 20 MG/100ML; MG/100ML; MG/100ML; MG/100ML
75 INJECTION, SOLUTION INTRAVENOUS CONTINUOUS
Status: DISCONTINUED | OUTPATIENT
Start: 2022-12-23 | End: 2022-12-23 | Stop reason: HOSPADM

## 2022-12-23 RX ORDER — DEXAMETHASONE SODIUM PHOSPHATE 10 MG/ML
INJECTION, SOLUTION INTRAMUSCULAR; INTRAVENOUS AS NEEDED
Status: DISCONTINUED | OUTPATIENT
Start: 2022-12-23 | End: 2022-12-23

## 2022-12-23 RX ORDER — CHLORHEXIDINE GLUCONATE 0.12 MG/ML
15 RINSE ORAL ONCE
Status: COMPLETED | OUTPATIENT
Start: 2022-12-23 | End: 2022-12-23

## 2022-12-23 RX ORDER — BUPIVACAINE HYDROCHLORIDE 5 MG/ML
INJECTION, SOLUTION PERINEURAL AS NEEDED
Status: DISCONTINUED | OUTPATIENT
Start: 2022-12-23 | End: 2022-12-23 | Stop reason: HOSPADM

## 2022-12-23 RX ORDER — FENTANYL CITRATE 50 UG/ML
INJECTION, SOLUTION INTRAMUSCULAR; INTRAVENOUS AS NEEDED
Status: DISCONTINUED | OUTPATIENT
Start: 2022-12-23 | End: 2022-12-23

## 2022-12-23 RX ORDER — ACETAMINOPHEN 325 MG/1
650 TABLET ORAL EVERY 4 HOURS PRN
Status: DISCONTINUED | OUTPATIENT
Start: 2022-12-23 | End: 2022-12-23 | Stop reason: HOSPADM

## 2022-12-23 RX ORDER — VANCOMYCIN HYDROCHLORIDE 1 G/200ML
1000 INJECTION, SOLUTION INTRAVENOUS ONCE
Status: COMPLETED | OUTPATIENT
Start: 2022-12-23 | End: 2022-12-23

## 2022-12-23 RX ORDER — PROMETHAZINE HYDROCHLORIDE 25 MG/ML
12.5 INJECTION, SOLUTION INTRAMUSCULAR; INTRAVENOUS
Status: DISCONTINUED | OUTPATIENT
Start: 2022-12-23 | End: 2022-12-23 | Stop reason: HOSPADM

## 2022-12-23 RX ORDER — HYDROMORPHONE HCL/PF 1 MG/ML
0.25 SYRINGE (ML) INJECTION
Status: DISCONTINUED | OUTPATIENT
Start: 2022-12-23 | End: 2022-12-23 | Stop reason: HOSPADM

## 2022-12-23 RX ORDER — LIDOCAINE HYDROCHLORIDE 10 MG/ML
INJECTION, SOLUTION EPIDURAL; INFILTRATION; INTRACAUDAL; PERINEURAL AS NEEDED
Status: DISCONTINUED | OUTPATIENT
Start: 2022-12-23 | End: 2022-12-23

## 2022-12-23 RX ORDER — SODIUM CHLORIDE, SODIUM LACTATE, POTASSIUM CHLORIDE, CALCIUM CHLORIDE 600; 310; 30; 20 MG/100ML; MG/100ML; MG/100ML; MG/100ML
INJECTION, SOLUTION INTRAVENOUS CONTINUOUS PRN
Status: DISCONTINUED | OUTPATIENT
Start: 2022-12-23 | End: 2022-12-23

## 2022-12-23 RX ADMIN — FENTANYL CITRATE 50 MCG: 50 INJECTION, SOLUTION INTRAMUSCULAR; INTRAVENOUS at 10:33

## 2022-12-23 RX ADMIN — KETOROLAC TROMETHAMINE 30 MG: 30 INJECTION, SOLUTION INTRAMUSCULAR; INTRAVENOUS at 10:25

## 2022-12-23 RX ADMIN — CHLORHEXIDINE GLUCONATE 0.12% ORAL RINSE 15 ML: 1.2 LIQUID ORAL at 06:02

## 2022-12-23 RX ADMIN — VANCOMYCIN HYDROCHLORIDE 1000 MG: 1 INJECTION, SOLUTION INTRAVENOUS at 07:40

## 2022-12-23 RX ADMIN — SODIUM CHLORIDE, SODIUM LACTATE, POTASSIUM CHLORIDE, AND CALCIUM CHLORIDE: .6; .31; .03; .02 INJECTION, SOLUTION INTRAVENOUS at 06:04

## 2022-12-23 RX ADMIN — DEXAMETHASONE SODIUM PHOSPHATE 10 MG: 10 INJECTION INTRAMUSCULAR; INTRAVENOUS at 07:48

## 2022-12-23 RX ADMIN — SODIUM CHLORIDE, SODIUM LACTATE, POTASSIUM CHLORIDE, AND CALCIUM CHLORIDE 100 ML/HR: .6; .31; .03; .02 INJECTION, SOLUTION INTRAVENOUS at 06:04

## 2022-12-23 RX ADMIN — SODIUM CHLORIDE, SODIUM LACTATE, POTASSIUM CHLORIDE, AND CALCIUM CHLORIDE: .6; .31; .03; .02 INJECTION, SOLUTION INTRAVENOUS at 08:34

## 2022-12-23 RX ADMIN — ONDANSETRON 4 MG: 2 INJECTION INTRAMUSCULAR; INTRAVENOUS at 10:20

## 2022-12-23 RX ADMIN — FENTANYL CITRATE 50 MCG: 50 INJECTION, SOLUTION INTRAMUSCULAR; INTRAVENOUS at 07:37

## 2022-12-23 RX ADMIN — FENTANYL CITRATE 50 MCG: 50 INJECTION, SOLUTION INTRAMUSCULAR; INTRAVENOUS at 08:41

## 2022-12-23 RX ADMIN — LIDOCAINE HYDROCHLORIDE 50 MG: 10 INJECTION, SOLUTION EPIDURAL; INFILTRATION; INTRACAUDAL; PERINEURAL at 07:37

## 2022-12-23 RX ADMIN — FENTANYL CITRATE 50 MCG: 50 INJECTION, SOLUTION INTRAMUSCULAR; INTRAVENOUS at 08:20

## 2022-12-23 RX ADMIN — SODIUM CHLORIDE, SODIUM LACTATE, POTASSIUM CHLORIDE, AND CALCIUM CHLORIDE: .6; .31; .03; .02 INJECTION, SOLUTION INTRAVENOUS at 07:31

## 2022-12-23 RX ADMIN — PROPOFOL 200 MG: 10 INJECTION, EMULSION INTRAVENOUS at 07:37

## 2022-12-23 RX ADMIN — MIDAZOLAM 2 MG: 1 INJECTION INTRAMUSCULAR; INTRAVENOUS at 07:30

## 2022-12-23 NOTE — PERIOPERATIVE NURSING NOTE
Voided 600 ml clear yellow urine  VSS  No pain at op site  Sore throat is relief by ice chips  Sleeping  Waiting for APU to take report

## 2022-12-23 NOTE — OP NOTE
OPERATIVE REPORT - Podiatry  PATIENT NAME: Ching Nuñez    :  1970  MRN: [id number]  Pt Location:  OR ROOM 12    SURGERY DATE: 2022    Surgeon(s) and Role:     * Rodolfo Ruth DPM - Primary     * Jacoby Rico DPM - Assisting    Pre-op Diagnosis:  Hallux valgus of left foot [M20 12]  Tailor's bunionette, left [M21 622]  Hallux valgus of right foot [M20 11]  Hammer toe of right foot [M20 41]  Tailor's bunionette, right [M21 621]    Post-Op Diagnosis Codes:     * Hallux valgus of left foot [M20 12]     * Tailor's bunionette, left [M21 622]     * Hallux valgus of right foot [M20 11]     * Hammer toe of right foot [M20 41]     * Tailor's bunionette, right [M21 621]    Procedure(s) (LRB):  BUNIONECTOMY LIZA (Bilateral)  BUNIONECTOMY TAILOR (Bilateral)  REPAIR RIGHT  HAMMERTOE  2nd (Right)    Specimen(s):  * No specimens in log *    Estimated Blood Loss:   Minimal    Drains:  * No LDAs found *    Anesthesia Type:   General/LMA with 20 ml of 1% Lidocaine and 0 5% Bupivacaine in a 1:1 mixture on right foot and 15 ml of 1% Lidocaine and 0 5% Bupivacaine in a 1:1 mixture on left foot       Hemostasis:  Pneumatic ankle tourniquet set at 250 mmHg for 90 mins on right and 63 mins on left  Direct compression, electrocautery    Materials:  Implant Name Type Inv   Item Serial No   Lot No  LRB No  Used Action   K-WIRE 0 045 X 4 IN PLAIN 2 POINT TYPE A TROCAR - [id number]  K-WIRE 0 045 X 4 IN PLAIN 2 POINT TYPE A TROCAR  [facility] [id number] Right 1 Implanted   SCREW COMP 2 5 X 14MM MICRO FT - [id number] Screw SCREW COMP 2 5 X 14MM MICRO FT  [employer]  Left 1 Implanted   SCREW COMP 2 5 X 14MM MICRO FT - [id number] Screw SCREW COMP 2 5 X 14MM MICRO FT  [employer]  Left 3 Implanted     3-0 vicryl  4-0 vicryl  4-0 nylon    Injectables:  None    Operative Findings:  Consistent with Diagnosis    Complications:   None    Procedure and Technique:     Under mild sedation, the patient was brought into the operating room and placed on the operating room table in the supine position  IV sedation was achieved by anesthesia team and a universal timeout was performed where all parties are in agreement of correct patient, correct procedure and correct site  A pneumatic tourniquet was then placed over the patient's bilateral lower extremity with ample padding  A costa, reverse costa, and digital block was performed consisting of 20 ml of local anesthetic  The foot was then prepped and draped in the usual aseptic manner  An esmarch bandage was used to exsangunate the foot and the pneumatic tourniquet was then inflated to 250 mmHg  Bilateral Camille Sans (right and left): Attention was then directed to the dorsal aspect of the first metatarsophalangeal joint medial to the EHL tendon where an approximately 6 cm linear incision was made through skin and subcutaneous tissue  The incision was deepened through the subcutaneous tissues using sharp and blunt dissection  Care was taken to identify and retract all vital neural and vascular structures  All bleeders were cauterized and ligated as necessary  A capsuloptomy was performed over the dorsal aspect of the MPJ  The periosteal and capsular structures were then carefully dissected free of their osseous attachments and reflected medially and laterally, thus exposing the head of the first metatarsal at the operative site  Attention was then directed to the 1st interspace via the original skin incision where the dissection was continued deep using sharp dissection down to the conjoined tendon of the adductor halluces was then identified and transected at its attachment  At this time the lateral contraction presents on the hallux was noted to be reduced and the sesamoid apparatus was noted to float into a more corrected medial position  Attention was then directed to the first met head where the medial prominence was resected by the sagittal bone saw   A k-wire was used as a guidewire at the proximal-medial aspect of the 1st met head  A through and through V type osteotomy was made at a 60 degree angle  This cut was created in the metataphyseal region of the bone utilizing a sagittal bone saw and the apices of this osteotomy pointing proximal plantarly and proximal dorsally  Upon completion of this osteotomy, the capital fragment was distracted and shifted laterally into a more corrected position and impacted onto the shaft of the first met  2 K wires were used as temp fixation across the osteotomy site  With proper technique two 2 5x14mm micro compression screws by [employer] serves as fixation across the osteotomy site  Attention was directed to the remaining medial bone shelf proximal to the osteotomy site which was resected using a sagittal saw and passed from operative field  Correction of the deformity was assessed at this time and noted to be adequate, the Gerson was no needed  The wound was then flushed with copious amounts of sterile saline  The periosteal and capsular structures were reapproximated using 3-0 Vicryl  The subQ tissues were reapproximated using 4-0 Vicryl and the skin was reapproximated using 4-0 Nylon in a horizontal mattress suture technique  The incision was then dressed with Betadine adaptic, Dry sterile dressing  The pneumatic ankle tourniquet was then deflated and a prompt hyperemic response was noted to all digits of the foot  This procedure was performed bilaterally  Bilateral Tailor bunionectomies (right and left): Attention was directed to the lateral aspect of the foot where there was noted lateral protuberance of the 5th metatarsal head  Incision was made directly over the 5th metatarsal head with a 15 blade through skin and subcutaneous tissues to deep  A capsular incision was made over the 5th metatarsal head with care to retract all vital neurovascular structures    Using a sagittal saw the lateral aspect of the 5th metatarsal head was resected and passed off the table  No more osseous protuberance was noted at this time  The wound was flushed copious amounts normal sterile saline  The periosteal and capsular structures were reapproximated using 3-0 Vicryl  The subQ tissues were reapproximated using 4-0 Vicryl and the skin was reapproximated using 4-0 Nylon in a horizontal mattress suture technique  This procedure was performed bilaterally  Right second digit hammertoe repair:  Attention was then directed to the right second digit  Hammertoe deformity was present  A  dorsal linear ellipse incision was made from the metatarsal- phalangeal joint to the proximal interphalangeal joint  The incision was then deepened via sharp dissection through the subcutaneous tissues, ligating all venous vessels as necessary  Dissection was carried to the level of the EDL tendon  The tendon was then transected at that level  The PIPJ was then exposed and the medial and lateral collateral ligaments were incised to expose the head of the proximal phalanx  By use of sagittal saw, the head of the proximal phalanx was resected on digit 2  Push test showed that the contracture was resolved  A 0 045 K-wire was directed from base of middle phalanx to central distal aspect out of the toe  The K-wire then was aimed retrograde from middle phalanx to proximal phalanx with no gapping noted at the PIPJ  This was performed on the 2nd digit  The surgical incision was irrigated with copious amounts of normal sterile saline  The periosteal and capsular and EDL tendon structures were reapproximated using 3-0 vicryl  Subcutaneous closure was obtained utilizing 4-0 vicryl  Skin edges were reapproximated and closure was obtained utilizing 4-0 nylon  The foot was then cleansed and dried  A postoperative injection consisting of 5 ml of 0 5% Bupivacaine was performed  The incision site was dressed with betadine soaked adaptic, gauze   This was then covered with a Sosa and coban    The tourniquet was deflated at approximately 90 min on right and 63 mins on left and normal hyperemic response was noted to all digits  The patient tolerated the procedure and anesthesia well without immediate complications and transferred to PACU with vital signs stable  Dr Cyrus Smith was present during the entire procedure and participated in all key aspects  SIGNATURE: Jamal Larios DPM  DATE: December 23, 2022  TIME: 10:19 AM      Portions of the record may have been created with voice recognition software  Occasional wrong word or "sound a like" substitutions may have occurred due to the inherent limitations of voice recognition software  Read the chart carefully and recognize, using context, where substitutions have occurred

## 2022-12-23 NOTE — DISCHARGE INSTR - AVS FIRST PAGE
Dr Mehreen Schroeder Instructions    1  Take your prescribed medication as directed  2  Upon arrival at home, lie down and elevate your surgical foot on 2 pillows  3  Stay off your feet as much as possible for the first 24-48 hours  This is when your feet first swell and may become painful  After 48 hours you may begin limited walking following these restrictions:     Weight-bearing as tolerated in surgical shoes     4  Drink large quantities of water  Consume no alcohol  Continue a well-balanced diet  5  Report any unusual discomfort or fever to this office  6  A limited amount of discomfort and swelling is to be expected  In some cases the skin may take on a bruised appearance  The surgical cleansing solution that was applied to your foot prior to the operation is dark in color and the operation site may appear to be oozing when it actually is not  7  A slight amount of blood is to be expected, and is no cause for alarm  Do not remove the dressings  If there is active bleeding and if the bleeding persists, add additional gauze to the bandage, apply direct pressure, elevate your feet and call this office  8  Do not get the dressings wet  As regular bathing may be inconvenient, sponge baths are recommended  9  When anesthesia wears off and if any discomfort should be present, apply an ice pack directly over the operated area for 15 minute intervals for several hours or until the pain leaves  (USE IN EXCESS OF 15 MINUTES COULD CAUSE FROSTBITE)  Do not use hot water bags or electric pads  A convenient icepack can be made by placing ice cubes in a plastic bag and covering this with a towel  10  Take over-the-counter laxative for constipation, this is common with use of narcotic medications

## 2022-12-23 NOTE — H&P
H&P Interval Update    H&P reviewed  After examining the patient I find no significant changes in the patients condition since the H&P had been written      Vitals:    12/23/22 0534   BP: 154/74   Pulse: 85   Resp: 18   Temp: (!) 96 6 °F (35 9 °C)   SpO2: 96%       Jaime Chan MD  Department of Anesthesiology and Acute Pain Management  December 23, 2022

## 2022-12-23 NOTE — ANESTHESIA PREPROCEDURE EVALUATION
Medical History  History Comments   Bacterial vaginosis    Obesity    Arthritis    Migraine    Varicella    Bilateral bunions    COVID-19      Procedure:  BUNIONECTOMY LIZA (Bilateral: Foot)  BUNIONECTOMY TAILOR (Bilateral: Foot)  REPAIR HAMMERTOE  2nd (Right: Foot)    Relevant Problems   ANESTHESIA (within normal limits)      CARDIO   (+) Essential hypertension      MUSCULOSKELETAL   (+) Osteoarthritis of left knee   (+) Tricompartment osteoarthritis of right knee        Physical Exam    Airway    Mallampati score: II  TM Distance: >3 FB  Neck ROM: full     Dental   No notable dental hx     Cardiovascular  Rate: normal,     Pulmonary  Pulmonary exam normal     Other Findings        Anesthesia Plan  ASA Score- 2     Anesthesia Type- general with ASA Monitors  Additional Monitors:   Airway Plan: LMA  Plan Factors-Exercise tolerance (METS): >4 METS  Chart reviewed  Patient summary reviewed  Patient is not a current smoker  Induction- intravenous  Postoperative Plan- Plan for postoperative opioid use  Informed Consent- Anesthetic plan and risks discussed with patient  I personally reviewed this patient with the CRNA  Discussed and agreed on the Anesthesia Plan with the CRNA  Kelly Ibarra

## 2022-12-23 NOTE — ANESTHESIA POSTPROCEDURE EVALUATION
Post-Op Assessment Note    CV Status:  Stable  Pain Score: 0    Pain management: adequate     Mental Status:  Alert and awake   Hydration Status:  Stable   PONV Controlled:  None   Airway Patency:  Patent      Post Op Vitals Reviewed: Yes      Staff: CRNA         No notable events documented      /84 (12/23/22 1047)    Temp 98 8 °F (37 1 °C) (12/23/22 1047)    Pulse 86 (12/23/22 1047)   Resp 12 (12/23/22 1047)    SpO2 98 % (12/23/22 1047)

## 2022-12-23 NOTE — DISCHARGE SUMMARY
Discharge Summary Outpatient Procedure Podiatry -   [facility] 47 y o  female MRN: [id number]  Unit/Bed#: [facility] Encounter: [id number]    Admission Date: 12/23/2022     Admitting Diagnosis: Hallux valgus of left foot [M20 12]  Tailor's bunionette, left [M21 622]  Hallux valgus of right foot [M20 11]  Hammer toe of right foot [M20 41]  Tailor's bunionette, right [M21 621]    Discharge Diagnosis: same    Procedures Performed: Karen Bright: 63451 (CPT®)  BUNIONECTOMY TAILOR: 47784 (CPT®)  REPAIR RIGHT  HAMMERTOE  2nd: 24589 (CPT®)    Complications: none    Condition at Discharge: stable    Discharge instructions/Information to patient and family:   See after visit summary for information provided to patient and family  Provisions for Follow-Up Care/Important appointments:  See after visit summary for information related to follow-up care and any pertinent home health orders  Discharge Medications:  See after visit summary for reconciled discharge medications provided to patient and family

## 2022-12-29 ENCOUNTER — OFFICE VISIT (OUTPATIENT)
Dept: PODIATRY | Facility: CLINIC | Age: 52
End: 2022-12-29

## 2022-12-29 VITALS
HEIGHT: 63 IN | SYSTOLIC BLOOD PRESSURE: 140 MMHG | DIASTOLIC BLOOD PRESSURE: 82 MMHG | HEART RATE: 84 BPM | BODY MASS INDEX: 32.77 KG/M2

## 2022-12-29 DIAGNOSIS — M20.41 HAMMER TOE OF RIGHT FOOT: ICD-10-CM

## 2022-12-29 DIAGNOSIS — M20.11 HALLUX VALGUS OF RIGHT FOOT: ICD-10-CM

## 2022-12-29 DIAGNOSIS — M20.12 HALLUX VALGUS OF LEFT FOOT: Primary | ICD-10-CM

## 2022-12-29 DIAGNOSIS — M21.622 TAILOR'S BUNIONETTE, LEFT: ICD-10-CM

## 2022-12-29 NOTE — PROGRESS NOTES
Patient presents 6 days post Tressia Dirk bunionectomy bilateral; hammertoe correction right second toe with pinning; tailor's bunionectomy bilateral   Patient has moderate pain within normal limits for procedure performed  She was advised to utilize Advil 3 tabs 3 times daily PC  There is no evidence of infection or wound dehiscence  Mild edema is present  Patient to continue with current orders    Reappoint 1 week for suture removal

## 2023-01-05 ENCOUNTER — OFFICE VISIT (OUTPATIENT)
Dept: PODIATRY | Facility: CLINIC | Age: 53
End: 2023-01-05

## 2023-01-05 VITALS
DIASTOLIC BLOOD PRESSURE: 83 MMHG | SYSTOLIC BLOOD PRESSURE: 143 MMHG | HEIGHT: 63 IN | BODY MASS INDEX: 32.77 KG/M2 | HEART RATE: 96 BPM

## 2023-01-05 DIAGNOSIS — M20.11 HALLUX VALGUS OF RIGHT FOOT: ICD-10-CM

## 2023-01-05 DIAGNOSIS — M21.622 TAILOR'S BUNIONETTE, LEFT: ICD-10-CM

## 2023-01-05 DIAGNOSIS — M21.621 TAILOR'S BUNIONETTE, RIGHT: ICD-10-CM

## 2023-01-05 DIAGNOSIS — M20.12 HALLUX VALGUS OF LEFT FOOT: Primary | ICD-10-CM

## 2023-01-05 DIAGNOSIS — M20.41 HAMMER TOE OF RIGHT FOOT: ICD-10-CM

## 2023-01-05 NOTE — PROGRESS NOTES
Patient presents 13 days post [facility] OF [place] bunionectomy bilateral, tailor's bunionectomy bilateral and hammertoe correction right second toe with pinning  All sutures were removed  No evidence of infection  Surgical sites healing uneventfully  Patient to continue with current orders for a right foot  She may now get left foot wet  Patient to remain in surgical shoes for 2 more weeks    She is rescheduled in 1 week for K wire removal

## 2023-01-12 ENCOUNTER — OFFICE VISIT (OUTPATIENT)
Dept: PODIATRY | Facility: CLINIC | Age: 53
End: 2023-01-12

## 2023-01-12 VITALS
DIASTOLIC BLOOD PRESSURE: 86 MMHG | SYSTOLIC BLOOD PRESSURE: 143 MMHG | HEIGHT: 63 IN | BODY MASS INDEX: 32.77 KG/M2 | HEART RATE: 90 BPM

## 2023-01-12 DIAGNOSIS — Z96.9 RETAINED ORTHOPEDIC HARDWARE: ICD-10-CM

## 2023-01-12 DIAGNOSIS — M20.11 HALLUX VALGUS OF RIGHT FOOT: Primary | ICD-10-CM

## 2023-01-12 DIAGNOSIS — M20.41 HAMMER TOE OF RIGHT FOOT: ICD-10-CM

## 2023-01-12 NOTE — PROGRESS NOTES
Patient presents for pin removal   3 week S/P right 2nd hammertoe repair  Post-op pain resolving well  She denied post-op concern  A formal timeout including patient identification, laterality and existing allergies using [facility] protocol was conducted  Discussed with the patient the surgical procedure (removal of the pin), and post-op course required to properly heal the surgery  I discussed risks as infection, scar, swelling, pain, poor healing of incision or bone  No guarantees were given and the possibility of recurrent toe contractures explained  Consent obtained  Pre-Op Diagnosis: Retained pin from toe  Post-Op Diagnosis: Same   Local Anesthetic: None    Procedure: The patient was brought to the treatment room and placed in the chair  The pin site was prepped with betadine solution  A hemostat was used to grasp the distal pin and gentle distraction and clockwise rotation was performed while stabilizing the toe with my contralateral hand  The pin was removed completely from the bone and toe  Bleeding of the pin site was controlled with gentle pressure  The pin was handed off of the field  The wound was dressed with a DSD was applied and she was instructed to keep all clean, dry and intact for 2 days  She tolerated the procedure well without complications  Pt will see Dr Lani Busby as scheduled

## 2023-01-19 ENCOUNTER — OFFICE VISIT (OUTPATIENT)
Dept: PODIATRY | Facility: CLINIC | Age: 53
End: 2023-01-19

## 2023-01-19 VITALS — SYSTOLIC BLOOD PRESSURE: 140 MMHG | DIASTOLIC BLOOD PRESSURE: 82 MMHG

## 2023-01-19 DIAGNOSIS — M21.621 TAILOR'S BUNIONETTE, RIGHT: ICD-10-CM

## 2023-01-19 DIAGNOSIS — M20.41 HAMMER TOE OF RIGHT FOOT: ICD-10-CM

## 2023-01-19 DIAGNOSIS — M20.11 HALLUX VALGUS OF RIGHT FOOT: Primary | ICD-10-CM

## 2023-01-19 DIAGNOSIS — M20.12 HALLUX VALGUS OF LEFT FOOT: ICD-10-CM

## 2023-01-19 NOTE — PROGRESS NOTES
Patient presents approximately 1 months post [facility] OF [place] bunionectomy bilateral; britney's bunionectomy right foot and hammertoe correction right second toe  Patient having mild pain within normal limits for procedure performed  No evidence of infection or wound dehiscence  Patient told that she may return to a running shoe pain permitted tomorrow  She will be reassessed in 3 weeks

## 2023-02-09 ENCOUNTER — OFFICE VISIT (OUTPATIENT)
Dept: PODIATRY | Facility: CLINIC | Age: 53
End: 2023-02-09

## 2023-02-09 VITALS
HEIGHT: 63 IN | HEART RATE: 99 BPM | DIASTOLIC BLOOD PRESSURE: 93 MMHG | BODY MASS INDEX: 32.77 KG/M2 | SYSTOLIC BLOOD PRESSURE: 168 MMHG

## 2023-02-09 DIAGNOSIS — M21.621 TAILOR'S BUNIONETTE, RIGHT: ICD-10-CM

## 2023-02-09 DIAGNOSIS — M20.41 HAMMER TOE OF RIGHT FOOT: ICD-10-CM

## 2023-02-09 DIAGNOSIS — M20.11 HALLUX VALGUS OF RIGHT FOOT: Primary | ICD-10-CM

## 2023-02-09 DIAGNOSIS — M20.12 HALLUX VALGUS OF LEFT FOOT: ICD-10-CM

## 2023-02-09 RX ORDER — IBUPROFEN 600 MG/1
600 TABLET ORAL
Qty: 120 TABLET | Refills: 0 | Status: SHIPPED | OUTPATIENT
Start: 2023-02-09 | End: 2023-03-11

## 2023-02-09 NOTE — PROGRESS NOTES
Patient, a 19-year-old English-speaking female presents approximately 7 weeks post [facility] bunionectomy bilateral, tailor's bunionectomy and hammertoe correction right second toe  Patient has been wearing running shoes for the past 3 weeks  She states that she can wear them the majority of her day but the feet throb especially by days end  On exam, surgical sites have healed uneventfully  Right second toe is edematous  Treatment: Explained to patient that her discomfort is not atypical for the amount of surgery that she had performed  Prescribed ibuprofen 600 mg 4 times daily PC  She will be reassessed in 4 weeks  Note: Paola Hernandez served as

## 2023-03-09 ENCOUNTER — OFFICE VISIT (OUTPATIENT)
Dept: PODIATRY | Facility: CLINIC | Age: 53
End: 2023-03-09

## 2023-03-09 VITALS
SYSTOLIC BLOOD PRESSURE: 157 MMHG | HEIGHT: 63 IN | HEART RATE: 88 BPM | DIASTOLIC BLOOD PRESSURE: 92 MMHG | BODY MASS INDEX: 32.77 KG/M2

## 2023-03-09 DIAGNOSIS — M20.11 HALLUX VALGUS OF RIGHT FOOT: ICD-10-CM

## 2023-03-09 DIAGNOSIS — M21.621 TAILOR'S BUNIONETTE, RIGHT: ICD-10-CM

## 2023-03-09 DIAGNOSIS — M20.12 HALLUX VALGUS OF LEFT FOOT: ICD-10-CM

## 2023-03-09 DIAGNOSIS — M20.41 HAMMER TOE OF RIGHT FOOT: ICD-10-CM

## 2023-03-09 DIAGNOSIS — M20.41 HAMMER TOE OF RIGHT FOOT: Primary | ICD-10-CM

## 2023-03-09 DIAGNOSIS — R60.0 LOCALIZED EDEMA: ICD-10-CM

## 2023-03-09 RX ORDER — TRIAMCINOLONE ACETONIDE 40 MG/ML
20 INJECTION, SUSPENSION INTRA-ARTICULAR; INTRAMUSCULAR ONCE
Status: COMPLETED | OUTPATIENT
Start: 2023-03-09 | End: 2023-03-09

## 2023-03-09 RX ORDER — LIDOCAINE HYDROCHLORIDE 10 MG/ML
1 INJECTION, SOLUTION EPIDURAL; INFILTRATION; INTRACAUDAL; PERINEURAL ONCE
Status: COMPLETED | OUTPATIENT
Start: 2023-03-09 | End: 2023-03-09

## 2023-03-09 RX ADMIN — TRIAMCINOLONE ACETONIDE 20 MG: 40 INJECTION, SUSPENSION INTRA-ARTICULAR; INTRAMUSCULAR at 17:53

## 2023-03-09 RX ADMIN — LIDOCAINE HYDROCHLORIDE 1 ML: 10 INJECTION, SOLUTION EPIDURAL; INFILTRATION; INTRACAUDAL; PERINEURAL at 17:52

## 2023-03-09 NOTE — PROGRESS NOTES
Patient presents 2 5 months post [facility] bunionectomy bilateral; britney's bunionectomy bilateral and hammertoe correction right second toe with pinning  Patient is wearing running shoes but is still having significant discomfort when walking  The right foot is more painful than the left  Both feet tend to swell with extended ambulation  In regards to the right foot, the primary area of pain is at the right second toe  This digit remains edematous  Recommended cortisone injection to reduce edema  Anesthesia via 2 cc 1% plain followed by 0 5 cc Kenalog 40 to the base of the toe  Patient does not feel that she is able to return to work due to the discomfort that she is currently feeling  Hopefully the cortisone injection will take effect and she will be able to return to work in 1 month  She will be reassessed in 1 month  Foot injection     Date/Time 3/9/2023 6:05 PM     Performed by  Rober Layne DPM     Authorized by Rober Layne DPM      Universal Protocol   Consent: Verbal consent obtained  Risks and benefits: risks, benefits and alternatives were discussed  Consent given by: patient  Patient understanding: patient states understanding of the procedure being performed  Patient identity confirmed: verbally with patient        Local anesthesia used: yes     Anesthesia   Local anesthesia used: yes  Local Anesthetic: lidocaine 1% without epinephrine     Procedure Details   Procedure Notes: Injected right second toe with 2 cc 1% Xylocaine followed by 0 5 cc Kenalog 40

## 2023-03-17 ENCOUNTER — APPOINTMENT (OUTPATIENT)
Dept: RADIOLOGY | Facility: MEDICAL CENTER | Age: 53
End: 2023-03-17

## 2023-03-17 ENCOUNTER — OFFICE VISIT (OUTPATIENT)
Dept: OBGYN CLINIC | Facility: MEDICAL CENTER | Age: 53
End: 2023-03-17

## 2023-03-17 VITALS
SYSTOLIC BLOOD PRESSURE: 149 MMHG | BODY MASS INDEX: 32.77 KG/M2 | DIASTOLIC BLOOD PRESSURE: 95 MMHG | HEIGHT: 63 IN | HEART RATE: 93 BPM

## 2023-03-17 DIAGNOSIS — M17.12 PRIMARY OSTEOARTHRITIS OF LEFT KNEE: Primary | ICD-10-CM

## 2023-03-17 DIAGNOSIS — Z01.89 ENCOUNTER FOR LOWER EXTREMITY COMPARISON IMAGING STUDY: ICD-10-CM

## 2023-03-17 DIAGNOSIS — M17.12 PRIMARY OSTEOARTHRITIS OF LEFT KNEE: ICD-10-CM

## 2023-03-17 NOTE — PROGRESS NOTES
Orthopaedic Surgery - Office Note  Basil Garcia (89 y o  female)   : 1970   MRN: [id number]  Encounter Date: 3/17/2023    Chief Complaint   Patient presents with   • Left Knee - Follow-up       Assessment / Plan  Left knee osteoarthritis     · Patient has moderate left knee osteoarthritis  · Order placed for left knee durolane injections  · Continue naproxen  · Continue brace as needed for pain  Return for left knee durolane injection with cassandra  History of Present Illness  Basil Garcia is a 47 y o  female who presents for follow up for left knee pain  Romansh translation with ipad  Patient reports 8/10 left knee pain, primarily on the lateral aspect of the knee  Patient notes pain is worse with weight bearing  Patient is taking naproxen for pain with relief  Patient has been wearing the knee brace when active and notes improvement  She has tried CSI in the past with minimal relief and had left knee durolane injection in 2021 and had significant relief after the injection  Patient would like to repeat durolane  Review of Systems  Pertinent items are noted in HPI  All other systems were reviewed and are negative  Physical Exam  /95   Pulse 93   Ht 5' 3" (1 6 m)   BMI 32 77 kg/m²   Cons: Appears well  No apparent distress  Psych: Alert  Oriented x3  Mood and affect normal   Eyes: PERRLA, EOMI  Resp: Normal effort  No audible wheezing or stridor  CV: Palpable pulse  No discernable arrhythmia  No LE edema  Lymph:  No palpable cervical, axillary, or inguinal lymphadenopathy  Skin: Warm  No palpable masses  No visible lesions  Neuro: Normal muscle tone  Normal and symmetric DTR's  Left Knee Exam  Alignment:  Normal knee alignment  Inspection:  No swelling  Palpation:  mild tenderness at lateral joint line  ROM:  Knee Extension 0  Knee Flexion 120  Strength:  5/5 quadriceps and hamstrings  Stability:  No objective knee instability   Stable Varus / Valgus stress, Lachman, and Posterior drawer  Tests:  No pertinent positive or negative tests  Patella:  Patella tracks centrally with crepitus  Neurovascular:  Sensation inatact L1-S1 LLE  Palpable DP & PT pulses  Gait:  Normal     Studies Reviewed  XR of bilateral knee - moderate osteoarthritis with joint space narrowing and osteophyte formation    Procedures  No procedures today  Medical, Surgical, Family, and Social History  The patient's medical history, family history, and social history, were reviewed and updated as appropriate      Past Medical History:   Diagnosis Date   • Arthritis    • Bacterial vaginosis    • Bilateral bunions    • COVID-19    • Migraine    • Obesity    • Varicella        Past Surgical History:   Procedure Laterality Date   • NC ARTHRS KNE SURG W/MENISCECTOMY MED/LAT W/SHVG Left 02/12/2020    Procedure: ARTHROSCOPY KNEE partial medial menisectomy;  Surgeon: Brianna Rehman MD;  Location:  MAIN OR;  Service: Orthopedics   • NC CORRECTION HAMMERTOE Right 12/23/2022    Procedure: REPAIR RIGHT  HAMMERTOE  2nd;  Surgeon: Meghann Pagan DPM;  Location: [address] [place] [address] OR;  Service: Podiatry   • Piroska U  97  W/SESMDC W/DIST METAR OSTEOT Bilateral 12/23/2022    Procedure: Lear Speed;  Surgeon: Meghann Pagan DPM;  Location: [address] [place] [address] MAIN OR;  Service: Podiatry   • NC OSTECTOMY PRTL 5TH METAR HEAD [address] [facility] Bilateral 12/23/2022    Procedure: Yuliet Macias;  Surgeon: Meghann Pagan DPM;  Location: [address] [place] [address] MAIN OR;  Service: Podiatry       Family History   Problem Relation Age of Onset   • Hypertension Mother    • Prostate cancer Father         unknown age   • Cancer Father    • No Known Problems Sister    • No Known Problems Daughter    • No Known Problems Maternal Grandmother    • No Known Problems Maternal Grandfather    • No Known Problems Paternal Grandmother    • No Known Problems Paternal Grandfather    • No Known Problems Maternal Aunt    • No Known Problems Paternal Aunt        Social History Occupational History   • Not on file   Tobacco Use   • Smoking status: Never   • Smokeless tobacco: Never   Vaping Use   • Vaping Use: Never used   Substance and Sexual Activity   • Alcohol use:  Yes     Alcohol/week: 1 0 standard drink     Types: 1 Cans of beer per week     Comment: rare   • Drug use: Never   • Sexual activity: Yes     Partners: Male       Allergies   Allergen Reactions   • Penicillins Anaphylaxis         Current Outpatient Medications:   •  Ascorbic Acid (VITAMIN C GUMMIE PO), Take 2 gums by mouth daily, Disp: , Rfl:   •  Aspirin-Acetaminophen-Caffeine (EXCEDRIN PO), Take by mouth as needed, Disp: , Rfl:   •  Biotin 10 MG CAPS, Take 1 capsule by mouth daily, Disp: , Rfl:   •  buPROPion (WELLBUTRIN XL) 150 mg 24 hr tablet, Take 150 mg by mouth every morning, Disp: , Rfl:   •  clindamycin (CLINDAGEL) 1 % gel, , Disp: , Rfl:   •  clotrimazole-betamethasone (LOTRISONE) 1-0 05 % cream, Apply 1 application topically 2 (two) times a day To affected area, Disp: 45 g, Rfl: 2  •  COLLAGEN PO, Take by mouth daily, Disp: , Rfl:   •  Cyanocobalamin (VITAMIN B 12 PO), Take by mouth every other day, Disp: , Rfl:   •  ibuprofen (MOTRIN) 600 mg tablet, Take 600 mg by mouth every 6 (six) hours as needed, Disp: , Rfl:   •  Misc Natural Products (APPLE CIDER VINEGAR DIET PO), Take by mouth daily, Disp: , Rfl:   •  naproxen (NAPROSYN) 500 mg tablet, Take 1 tablet (500 mg total) by mouth 2 (two) times a day with meals, Disp: 60 tablet, Rfl: 2  •  naproxen (NAPROSYN) 500 mg tablet, TAKE 1 TABLET BY MOUTH TWICE A DAY WITH MEALS, Disp: 60 tablet, Rfl: 2  •  Omega-3 1000 MG CAPS, Take 1 capsule by mouth daily, Disp: , Rfl:   •  sulfamethoxazole-trimethoprim (BACTRIM DS) 800-160 mg per tablet, Take 1 tablet by mouth 2 (two) times a day, Disp: , Rfl:   •  valACYclovir (VALTREX) 500 mg tablet, Take 500 mg by mouth daily, Disp: , Rfl:   •  VITAMIN A PO, Take by mouth every other day, Disp: , Rfl:   •  VITAMIN D PO, Take 1 tablet by mouth in the morning, Disp: , Rfl:   •  DOCOSAHEXAENOIC ACID PO, Take 1 capsule by mouth (Patient not taking: Reported on 12/9/2022), Disp: , Rfl:   •  ibuprofen (MOTRIN) 600 mg tablet, Take 1 tablet (600 mg total) by mouth 4 (four) times daily (after meals and at bedtime), Disp: 120 tablet, Rfl: 0  •  phentermine 37 5 MG capsule, Take 37 5 mg by mouth (Patient not taking: Reported on 12/20/2022), Disp: , Rfl:   •  topiramate (TOPAMAX) 50 MG tablet, Take 50 mg by mouth 2 (two) times a day (Patient not taking: Reported on 12/20/2022), Disp: , Rfl:       Pat Richards PA-C    Scribe Attestation    I,:  Pat Richards PA-C am acting as a scribe while in the presence of the attending physician :       I,:  Martina Patel MD personally performed the services described in this documentation    as scribed in my presence :

## 2023-04-22 NOTE — PROGRESS NOTES
51 y o  female presenting to the office for follow up of right knee pain  Patient describes the pain as aching which is chronic with fluctuations in pain level  Patient state that the pain gets worse with prolonged activities and position changes from sitting to standing  Patient has tried corticosteroid injections with improvement, which last for about 3 months  Patient has feelings of instability  Patient states that the pain does keep her up at night  Patient does have functional limitations preventing her from doing regular daily activities  ROS  Review of Systems   Constitutional: Negative for fever and unexpected weight change  HENT: Negative for hearing loss, nosebleeds and sore throat  Eyes: Negative for pain, redness and visual disturbance  Respiratory: Negative for cough, shortness of breath and wheezing  Cardiovascular: Negative for chest pain, palpitations and leg swelling  Gastrointestinal: Negative for abdominal pain, nausea and vomiting  Endocrine: Negative for polydipsia and polyuria  Genitourinary: Negative for dysuria and hematuria  Skin: Negative for rash and wound  Neurological: Negative for dizziness and headaches  Psychiatric/Behavioral: Negative for agitation and suicidal ideas  Past Medical History:   Diagnosis Date    Arthritis     Bacterial vaginosis     Obesity      No past surgical history on file  Results Reviewed     None          Physical Exam  Physical Exam   Constitutional: She is oriented to person, place, and time  She appears well-developed and well-nourished  HENT:   Head: Normocephalic and atraumatic  Eyes: Pupils are equal, round, and reactive to light  EOM are normal    Neck: Neck supple  No tracheal deviation present  Cardiovascular: Normal rate and regular rhythm  Pulmonary/Chest: Effort normal and breath sounds normal    Abdominal: There is no guarding  Musculoskeletal:        Right knee: She exhibits no effusion  Neurological: She is alert and oriented to person, place, and time  Skin: Skin is warm and dry  Psychiatric: She has a normal mood and affect  Her behavior is normal      Right Knee Exam     Tenderness   The patient is experiencing tenderness in the medial joint line and lateral joint line  Range of Motion   Extension: 0   Flexion: 140     Muscle Strength     The patient has normal right knee strength      Tests   Varus: negative  Valgus: negative    Other   Sensation: normal  Swelling: mild  Other tests: no effusion present        Procedures  Large joint arthrocentesis  Date/Time: 10/26/2018 9:10 AM  Consent given by: patient  Timeout: Immediately prior to procedure a time out was called to verify the correct patient, procedure, equipment, support staff and site/side marked as required   Supporting Documentation  Indications: pain   Procedure Details  Location: knee - R knee  Preparation: Patient was prepped and draped in the usual sterile fashion  Needle size: 22 G  Ultrasound guidance: no  Approach: anterolateral  Medications administered: 2 mL bupivacaine 0 25 %; 2 mL lidocaine 1 %; 6 mg betamethasone acetate-betamethasone sodium phosphate 6 (3-3) mg/mL    Patient tolerance: patient tolerated the procedure well with no immediate complications  Dressing:  Sterile dressing applied        Assessment/Plan  51 y o  female with   1  Primary osteoarthritis of right knee  - Large joint arthrocentesis  --- corticosteroid injection provided today  - discussed with patient that she should continue with injections more regularly, as she has been getting relief for 3 months  --- patient to schedule appointment for 3 months for re-evaluation calm

## 2023-04-25 ENCOUNTER — TELEPHONE (OUTPATIENT)
Dept: PODIATRY | Facility: CLINIC | Age: 53
End: 2023-04-25

## 2023-04-25 NOTE — TELEPHONE ENCOUNTER
Caller: Bo Ulloa    Doctor/Office: [facility]#: [phone]    Escalation: Oni Enrique called to see if we received the disability papers from her insurance  I told her that we did and it was filled out and returned yesterday, 4/24/23

## 2023-05-23 ENCOUNTER — OFFICE VISIT (OUTPATIENT)
Dept: PODIATRY | Facility: CLINIC | Age: 53
End: 2023-05-23

## 2023-05-23 VITALS
BODY MASS INDEX: 32.78 KG/M2 | SYSTOLIC BLOOD PRESSURE: 157 MMHG | HEIGHT: 63 IN | DIASTOLIC BLOOD PRESSURE: 88 MMHG | HEART RATE: 82 BPM | WEIGHT: 185 LBS

## 2023-05-23 DIAGNOSIS — M20.11 HALLUX VALGUS OF RIGHT FOOT: Primary | ICD-10-CM

## 2023-05-23 DIAGNOSIS — M20.12 HALLUX VALGUS OF LEFT FOOT: ICD-10-CM

## 2023-05-23 DIAGNOSIS — M20.41 HAMMER TOE OF RIGHT FOOT: ICD-10-CM

## 2023-05-23 DIAGNOSIS — M21.621 TAILOR'S BUNIONETTE, RIGHT: ICD-10-CM

## 2023-05-23 NOTE — LETTER
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

## 2023-05-23 NOTE — PROGRESS NOTES
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

## 2023-05-25 ENCOUNTER — DOCUMENTATION (OUTPATIENT)
Dept: PODIATRY | Facility: CLINIC | Age: 53
End: 2023-05-25

## 2023-05-25 ENCOUNTER — TELEPHONE (OUTPATIENT)
Dept: PODIATRY | Facility: CLINIC | Age: 53
End: 2023-05-25

## 2023-06-07 RX ORDER — IBUPROFEN 600 MG/1
600 TABLET ORAL
Qty: 120 TABLET | Refills: 0 | OUTPATIENT
Start: 2023-06-07

## 2023-06-09 ENCOUNTER — OFFICE VISIT (OUTPATIENT)
Dept: FAMILY MEDICINE CLINIC | Facility: CLINIC | Age: 53
End: 2023-06-09
Payer: COMMERCIAL

## 2023-06-09 VITALS
HEIGHT: 63 IN | RESPIRATION RATE: 15 BRPM | OXYGEN SATURATION: 96 % | BODY MASS INDEX: 34.55 KG/M2 | SYSTOLIC BLOOD PRESSURE: 142 MMHG | DIASTOLIC BLOOD PRESSURE: 80 MMHG | HEART RATE: 80 BPM | WEIGHT: 195 LBS

## 2023-06-09 DIAGNOSIS — E66.09 CLASS 1 OBESITY DUE TO EXCESS CALORIES WITHOUT SERIOUS COMORBIDITY WITH BODY MASS INDEX (BMI) OF 34.0 TO 34.9 IN ADULT: ICD-10-CM

## 2023-06-09 DIAGNOSIS — Z00.00 ANNUAL PHYSICAL EXAM: Primary | ICD-10-CM

## 2023-06-09 DIAGNOSIS — M79.18 BUTTOCK PAIN: ICD-10-CM

## 2023-06-09 DIAGNOSIS — I10 ESSENTIAL HYPERTENSION: ICD-10-CM

## 2023-06-09 DIAGNOSIS — Z12.31 ENCOUNTER FOR SCREENING MAMMOGRAM FOR MALIGNANT NEOPLASM OF BREAST: ICD-10-CM

## 2023-06-09 PROBLEM — E66.811 CLASS 1 OBESITY DUE TO EXCESS CALORIES WITHOUT SERIOUS COMORBIDITY WITH BODY MASS INDEX (BMI) OF 34.0 TO 34.9 IN ADULT: Status: ACTIVE | Noted: 2023-06-09

## 2023-06-09 PROBLEM — M79.601 RIGHT ARM PAIN: Status: RESOLVED | Noted: 2022-03-03 | Resolved: 2023-06-09

## 2023-06-09 PROCEDURE — 99214 OFFICE O/P EST MOD 30 MIN: CPT | Performed by: FAMILY MEDICINE

## 2023-06-09 PROCEDURE — 99396 PREV VISIT EST AGE 40-64: CPT | Performed by: FAMILY MEDICINE

## 2023-06-09 RX ORDER — AMLODIPINE BESYLATE 5 MG/1
5 TABLET ORAL DAILY
Qty: 90 TABLET | Refills: 3 | Status: CANCELLED | OUTPATIENT
Start: 2023-06-09

## 2023-06-09 RX ORDER — BUPROPION HYDROCHLORIDE 150 MG/1
150 TABLET ORAL EVERY MORNING
Status: CANCELLED | OUTPATIENT
Start: 2023-06-09

## 2023-06-09 RX ORDER — NIFEDIPINE 30 MG/1
30 TABLET, FILM COATED, EXTENDED RELEASE ORAL DAILY
Qty: 90 TABLET | Refills: 3 | Status: SHIPPED | OUTPATIENT
Start: 2023-06-09 | End: 2024-06-03

## 2023-06-09 NOTE — ASSESSMENT & PLAN NOTE
Uncontrolled  Previously on amlodipine but pt has side effects including nausea and dizziness  Switch to procardia  Discussed that she should let us know if she has the same side effects  She checks bp at home and the readings are in the 627Y to 430J systolic     She would like to try weight loss first

## 2023-06-09 NOTE — ASSESSMENT & PLAN NOTE
Pt states that she is going through treatment for arthritis of the knees and the steroid injections caused weight gain so the orthopedic recommended to see a dietitian after the steroid treatments are out of the body because it will be a waste of time  Explained that improving health is never a waste of time  Referred to weight mgmt  She is also interested in bariatric surgery

## 2023-06-09 NOTE — PROGRESS NOTES
[facility]    NAME: Jacob Chavez  AGE: 47 y o  SEX: female  : 1970     DATE: 2023     Assessment and Plan:     Problem List Items Addressed This Visit        Cardiovascular and Mediastinum    Essential hypertension     Uncontrolled  Previously on amlodipine but pt has side effects including nausea and dizziness  Switch to procardia  Discussed that she should let us know if she has the same side effects  She checks bp at home and the readings are in the 439X to 263E systolic  She would like to try weight loss first           Relevant Medications    NIFEdipine ER (ADALAT CC) 30 MG 24 hr tablet       Other    Annual physical exam - Primary     Dexa 22 and normal  Menopause was at 43or 35years old  Colonocopy 2020 that did not show precancerous polyps  Due in   Mammogram due after in July  She is UTD on pap  Has a gynecologist  Louie Hughes  Blood work ordered  Fu in 1 year  Class 1 obesity due to excess calories without serious comorbidity with body mass index (BMI) of 34 0 to 34 9 in adult     Pt states that she is going through treatment for arthritis of the knees and the steroid injections caused weight gain so the orthopedic recommended to see a dietitian after the steroid treatments are out of the body because it will be a waste of time  Explained that improving health is never a waste of time  Referred to weight mgmt  She is also interested in bariatric surgery  Other Visit Diagnoses     Encounter for screening mammogram for malignant neoplasm of breast        Relevant Orders    Mammo screening bilateral w 3d & cad    Buttock pain        Relevant Orders    Ambulatory Referral to Physical Therapy          Immunizations and preventive care screenings were discussed with patient today   Appropriate education was printed on patient's after visit summary  Counseling:  Alcohol/drug use: discussed moderation in alcohol intake, the recommendations for healthy alcohol use, and avoidance of illicit drug use  Dental Health: discussed importance of regular tooth brushing, flossing, and dental visits  Exercise: the importance of regular exercise/physical activity was discussed  Recommend exercise 3-5 times per week for at least 30 minutes  Return in about 3 months (around 9/9/2023) for bp recheck   Chief Complaint:     Chief Complaint   Patient presents with   • Annual Exam      History of Present Illness:     Adult Annual Physical   Patient here for a comprehensive physical exam  The patient reports problems - see below  Jennie Gilmore is concerned about pain of the buttocks that is worse with squatting and walking  Symptoms started about 6-7 months ago  She fell about 1 year ago on ice and fell on her side  Denies numbness, tingling, weakness of the legs  She is interested in PT  Diet and Physical Activity  Diet/Nutrition: well balanced diet  Exercise: walking  Depression Screening  PHQ-2/9 Depression Screening         General Health  Sleep: sleeps well  Hearing: no issues  Vision: no vision problems  Dental: regular dental visits  /GYN Health  Patient is: postmenopausal  Last menstrual period: 34 yo     Review of Systems:     Review of Systems   Constitutional: Negative for fever and unexpected weight change  HENT: Negative for ear pain, sore throat and trouble swallowing  Eyes: Negative for pain and visual disturbance  Respiratory: Negative for cough, chest tightness, shortness of breath and wheezing  Cardiovascular: Negative for chest pain  Gastrointestinal: Negative for abdominal distention, abdominal pain, blood in stool, constipation, diarrhea, nausea and vomiting  Endocrine: Negative for polydipsia and polyuria  Genitourinary: Negative for dysuria and hematuria     Musculoskeletal: Negative for back pain and myalgias  Skin: Negative for rash  Neurological: Negative for syncope and headaches  Psychiatric/Behavioral: Negative for suicidal ideas  Past Medical History:     Past Medical History:   Diagnosis Date   • Arthritis    • Bacterial vaginosis    • Bilateral bunions    • COVID-19    • Migraine    • Obesity    • Varicella       Past Surgical History:     Past Surgical History:   Procedure Laterality Date   • WY ARTHRS KNE SURG W/MENISCECTOMY MED/LAT W/SHVG Left 02/12/2020    Procedure: ARTHROSCOPY KNEE partial medial menisectomy;  Surgeon: Robert Monroe MD;  Location:  MAIN OR;  Service: Orthopedics   • W[place] CORRECTION HAMMERTOE Right 12/23/2022    Procedure: REPAIR RIGHT  HAMMERTOE  2nd;  Surgeon: Elizabeth Key DPM;  Location: [address] [place] [address] MAIN OR;  Service: Podiatry   • Piroska U  97  W/SESMDC W/DIST Mindi Alejo Bilateral 12/23/2022    Procedure: Vern Sheth;  Surgeon: Elizabeth Key DPM;  Location: [address] [place] [address] MAIN OR;  Service: Podiatry   • [place] OSTECTOMY PRTL 5TH METAR HEAD [facility] Bilateral 12/23/2022    Procedure: Doug Hadley;  Surgeon: Elizabeth Key DPM;  Location: [address] [place] [address] MAIN OR;  Service: Podiatry      Social History:     Social History     Socioeconomic History   • Marital status: /Civil Union     Spouse name: None   • Number of children: 1   • Years of education: None   • Highest education level: None   Occupational History   • None   Tobacco Use   • Smoking status: Never   • Smokeless tobacco: Never   Vaping Use   • Vaping Use: Never used   Substance and Sexual Activity   • Alcohol use:  Yes     Alcohol/week: 1 0 standard drink of alcohol     Types: 1 Cans of beer per week     Comment: rare   • Drug use: Never   • Sexual activity: Yes     Partners: Male   Other Topics Concern   • None   Social History Narrative    Uses seatbelts     Social Determinants of Health     Financial Resource Strain: Low Risk  (5/15/2020)    Overall Financial Resource Strain (CARDIA)    • Difficulty of Paying Living Expenses: Not very hard   Food Insecurity: No Food Insecurity (5/15/2020)    Hunger Vital Sign    • Worried About Running Out of Food in the Last Year: Never true    • Ran Out of Food in the Last Year: Never true   Transportation Needs: No Transportation Needs (5/15/2020)    PRAPARE - Transportation    • Lack of Transportation (Medical): No    • Lack of Transportation (Non-Medical): No   Physical Activity: Inactive (5/15/2020)    Exercise Vital Sign    • Days of Exercise per Week: 0 days    • Minutes of Exercise per Session: 0 min   Stress: No Stress Concern Present (5/15/2020)    [address]    • Feeling of Stress :  Only a little   Social Connections: Unknown (5/15/2020)    Social Connection and Isolation Panel [NHANES]    • Frequency of Communication with Friends and Family: Patient refused    • Frequency of Social Gatherings with Friends and Family: Patient refused    • Attends Voodoo Services: Patient refused    • Active Member of Clubs or Organizations: Patient refused    • Attends Club or Organization Meetings: Patient refused    • Marital Status: Patient refused   Intimate Partner Violence: Unknown (5/15/2020)    Humiliation, Afraid, Rape, and Kick questionnaire    • Fear of Current or Ex-Partner: Patient refused    • Emotionally Abused: Patient refused    • Physically Abused: Patient refused    • Sexually Abused: Patient refused   Housing Stability: Not on file      Family History:     Family History   Problem Relation Age of Onset   • Hypertension Mother    • Prostate cancer Father         unknown age   • Cancer Father    • No Known Problems Sister    • No Known Problems Daughter    • No Known Problems Maternal Grandmother    • No Known Problems Maternal Grandfather    • No Known Problems Paternal Grandmother    • No Known Problems Paternal Grandfather    • No Known Problems Maternal Aunt    • No Known Problems Paternal Aunt "     Current Medications:     Current Outpatient Medications   Medication Sig Dispense Refill   • NIFEdipine ER (ADALAT CC) 30 MG 24 hr tablet Take 1 tablet (30 mg total) by mouth daily 90 tablet 3   • Aspirin-Acetaminophen-Caffeine (EXCEDRIN PO) Take by mouth as needed     • Biotin 10 MG CAPS Take 1 capsule by mouth daily     • buPROPion (WELLBUTRIN XL) 150 mg 24 hr tablet Take 150 mg by mouth every morning     • clindamycin (CLINDAGEL) 1 % gel      • clotrimazole-betamethasone (LOTRISONE) 1-0 05 % cream Apply 1 application topically 2 (two) times a day To affected area 45 g 2   • COLLAGEN PO Take by mouth daily     • Cyanocobalamin (VITAMIN B 12 PO) Take by mouth every other day     • ibuprofen (MOTRIN) 600 mg tablet Take 600 mg by mouth every 6 (six) hours as needed     • Misc Natural Products (APPLE CIDER VINEGAR DIET PO) Take by mouth daily     • naproxen (NAPROSYN) 500 mg tablet Take 1 tablet (500 mg total) by mouth 2 (two) times a day with meals 60 tablet 2   • Omega-3 1000 MG CAPS Take 1 capsule by mouth daily     • valACYclovir (VALTREX) 500 mg tablet Take 500 mg by mouth daily     • VITAMIN A PO Take by mouth every other day     • VITAMIN D PO Take 1 tablet by mouth in the morning       No current facility-administered medications for this visit  Allergies: Allergies   Allergen Reactions   • Penicillins Anaphylaxis      Physical Exam:     /80   Pulse 80   Resp 15   Ht 5' 3\" (1 6 m)   Wt 88 5 kg (195 lb)   SpO2 96%   BMI 34 54 kg/m²     Physical Exam  Constitutional:       Appearance: She is well-developed  HENT:      Head: Normocephalic and atraumatic  Eyes:      General: No scleral icterus  Conjunctiva/sclera: Conjunctivae normal       Pupils: Pupils are equal, round, and reactive to light  Cardiovascular:      Rate and Rhythm: Normal rate and regular rhythm  Heart sounds: Normal heart sounds  No murmur heard  No friction rub  No gallop     Pulmonary:      Effort: " Pulmonary effort is normal  No respiratory distress  Breath sounds: No wheezing or rales  Chest:      Chest wall: No tenderness  Abdominal:      General: Bowel sounds are normal  There is no distension  Palpations: Abdomen is soft  There is no mass  Tenderness: There is no abdominal tenderness  Musculoskeletal:         General: Normal range of motion  Cervical back: Normal range of motion  Skin:     General: Skin is warm and dry  Findings: No rash  Neurological:      Mental Status: She is alert and oriented to person, place, and time  Cranial Nerves: No cranial nerve deficit            Shashank Rice MD  [address] [place] [address]

## 2023-06-29 ENCOUNTER — OFFICE VISIT (OUTPATIENT)
Dept: PODIATRY | Facility: CLINIC | Age: 53
End: 2023-06-29
Payer: COMMERCIAL

## 2023-06-29 VITALS
SYSTOLIC BLOOD PRESSURE: 137 MMHG | HEART RATE: 99 BPM | WEIGHT: 195 LBS | HEIGHT: 63 IN | DIASTOLIC BLOOD PRESSURE: 83 MMHG | BODY MASS INDEX: 34.55 KG/M2 | RESPIRATION RATE: 18 BRPM

## 2023-06-29 DIAGNOSIS — G62.9 PERIPHERAL NERVE DISORDER: ICD-10-CM

## 2023-06-29 DIAGNOSIS — M21.621 TAILOR'S BUNIONETTE, RIGHT: ICD-10-CM

## 2023-06-29 DIAGNOSIS — M20.11 HALLUX VALGUS OF RIGHT FOOT: Primary | ICD-10-CM

## 2023-06-29 PROCEDURE — 99213 OFFICE O/P EST LOW 20 MIN: CPT | Performed by: PODIATRIST

## 2023-06-29 RX ORDER — GABAPENTIN 300 MG/1
300 CAPSULE ORAL
Qty: 30 CAPSULE | Refills: 0 | Status: SHIPPED | OUTPATIENT
Start: 2023-06-29 | End: 2023-07-29

## 2023-06-29 NOTE — PROGRESS NOTES
Patient presents over 6 months postop from multiple foot surgeries affecting each foot  Shashankkaleigh Crawleyquyen served as an   Patient returned to work approximately 1 month ago  Her job involves significant standing walking and walking up steps  Patient states that her right foot is very uncomfortable  She relates burning tingling and numbness throughout the day  Specific areas of her discomfort are the right second toe and the fifth metatarsal where she had a tailor's bunionectomy performed  She also relates paresthesia along the dorsal lateral aspect of the right foot  On exam, the right second toe is slightly elevated above plain of the lesser toes when standing  No pathology noted along lateral aspect right foot or at the fifth metatarsal head  I personally reviewed x-rays of the right foot which reveal prior hammertoe correction right second toe, hallux valgus correction right foot and tailor's bunionectomy right foot  No significant osseous pathology present  Explained to patient that she is dealing with peripheral neuropathy in the right foot  This discomfort does improve when patient has weekends off  She was given a note to decrease her job demands at work  She was placed on gabapentin 300 mg at bedtime  She is rescheduled in 4 weeks

## 2023-06-30 DIAGNOSIS — M17.12 PRIMARY OSTEOARTHRITIS OF LEFT KNEE: ICD-10-CM

## 2023-06-30 RX ORDER — NAPROXEN 500 MG/1
500 TABLET ORAL 2 TIMES DAILY WITH MEALS
Qty: 60 TABLET | Refills: 0 | Status: SHIPPED | OUTPATIENT
Start: 2023-06-30

## 2023-07-05 ENCOUNTER — EVALUATION (OUTPATIENT)
Dept: PHYSICAL THERAPY | Facility: CLINIC | Age: 53
End: 2023-07-05
Payer: COMMERCIAL

## 2023-07-05 DIAGNOSIS — M79.18 BUTTOCK PAIN: ICD-10-CM

## 2023-07-05 DIAGNOSIS — M53.3 SACROILIAC JOINT PAIN: Primary | ICD-10-CM

## 2023-07-05 PROCEDURE — 97162 PT EVAL MOD COMPLEX 30 MIN: CPT

## 2023-07-05 PROCEDURE — 97112 NEUROMUSCULAR REEDUCATION: CPT

## 2023-07-05 NOTE — PROGRESS NOTES
PT Evaluation     Today's date: 2023  Patient name: Summer Mcnally  : 1970  MRN: [id number]  Referring provider: Skye Cobb  Dx:   Encounter Diagnosis     ICD-10-CM    1. Sacroiliac joint pain  M53.3       2. Buttock pain  M79.18 Ambulatory Referral to Physical Therapy          Start Time: 1150  Stop Time: 1250  Total time in clinic (min): 60 minutes    Assessment  Assessment details: Pt is a 47 yof presenting to therapy for bilateral glute and low back pain following multiple falls over the past 6 months, signs and symptoms consistent with SIJ pain. Pt displays weakness in bilateral hips and knees with hip flexion, knee extension, knee flexion, hip ER, and hip abduction. Pt has the most significant reproduction of pain with palpation to SIJ, gluteal muscles, and greater trochanter, though minor sciatic nerve tension was noted bilaterally. Pt's chief complaint was also reproduced with SIJ compression and gapping. 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 Pt will benefit from continued therapy once a week for 6-8 weeks to improve strength, pain, and function. Impairments: abnormal or restricted ROM, activity intolerance, impaired physical strength, lacks appropriate home exercise program and pain with function    Symptom irritability: moderateUnderstanding of Dx/Px/POC: good   Prognosis: good    Goals  Short term goals (3-4 weeks)  1. Pt will display independence with understanding and performance of HEP to allow for carryover of plan of care at home. 2. Pt will improve FOTO score from initial evaluation to show improvement in pain and function. 3. Pt will increase bilateral knee extension strength to 4+/5 to improve ambulation and stair navigation. 4. Pt will increase bilateral hip ER strength to 4+/5 to improve ambulation and stair navigation.    5. Pt will increase bilateral hip abduction strength to 4/5 to improve ambulation and stair navigation. Long term goals (6-8 weeks)  1. Pt will score equal or better than projected score on FOTO to show improvement in pain and function. 2. Pt will increase bilateral knee extension strength to 5/5 to improve ambulation and stair navigation. 3. Pt will increase bilateral hip ER strength to 5/5 to improve ambulation and stair navigation. 4. Pt will increase bilateral hip abduction strength to 4+/5 to improve ambulation and stair navigation. Plan  Patient would benefit from: skilled physical therapy  Planned modality interventions: TENS, thermotherapy: hydrocollator packs, traction, ultrasound, cryotherapy and low level laser therapy  Planned therapy interventions: body mechanics training, therapeutic training, therapeutic exercise, therapeutic activities, stretching, strengthening, neuromuscular re-education, patient education, home exercise program, functional ROM exercises, flexibility, manual therapy, Rivera taping, joint mobilization and balance  Frequency: 1x week  Duration in weeks: 8  Treatment plan discussed with: patient        Subjective Evaluation    History of Present Illness  Mechanism of injury: Pt presents to PT with bilateral glute and low back pain, R worse than L, with occasional radiating pain down to knees. Pt reports the pain started following two falls last year. The first fall was 6 months ago after she slipped on ice/snow. After the second fall, pt reports the pain worsened. The second fall was due to losing her footing on the stairs and falling down stairs. Pt did not have any imaging following either fall. Pt has a history of L knee surgery (taking naproxen currently for knee pain): 3 years ago partial medial menisectomy. Pt continues to wear brace from surgery 3 years prior. Pt also has history of recent surgery for both feet for bunions.  Pt has increased difficulty with standing, walking, navigating stairs, and bending/lifting that is required for her job working in a warehouse due to bilateral glute/low back pain.        Pain  Current pain ratin  At best pain ratin (lying down)  At worst pain ratin  Quality: needle-like  Relieving factors: change in position and relaxation (laying down on back )  Aggravating factors: lifting, standing and walking  Progression: no change    Social Support    Employment status: working (work in warehouse, requiring walking and stair navigation, bending/lifting)  Patient Goals  Patient goals for therapy: decreased pain and increased strength          Objective     General Comments:      Lumbar Comments  Hip ROM  -Flexion: WNL pain at end range R, WNL no pain L  -ER: WNL pain at end range R, WNL no pain L  -IR: WNL no pain R, WNL L with glute/low back pain at end range    LE strength   Hip flex: 4/5 L, 3+/5 R  Knee ext: 3+/5 R with pain in knee, 4/5 L  Knee flex: 4/5 bilat  DF: 4/5 bilat with pain in R foot due to s/p surgery  PF: 5/5 with R lateral foot pain s/p surgery  Hip abduct: 4/5 bilat   Hip ER: 4/5 R, 4+/5 L    Special tests  -Sciatic tension test: (+) bilat   -SI compression: (+)  -SI gapping: (+)  -SKYLAR; (-) R reproducing radiating back pain into R glute  -FADDIR: (-) bilat, L reproducing low back pain    Palpation  -Lumbar PA and unilateral PA: ttp throughout thoracic and lumbar spine, significant pain reproduced with SIJ PA and UPAs, significant pain with gluteal muscle palpation and greater trochanter palpation      Flowsheet Rows    Flowsheet Row Most Recent Value   PT/OT G-Codes    Current Score 44   Projected Score 65             Precautions: HTN, tricompartment OA of R knee, complex tear of medial meniscus of L knee, OA of L knee, patellofemoral disorder of L knee, chronic pain of L knee, obesity, migraine      Manuals             SIJ supine to sit assess  nv                                                   Neuro Re-Ed             Bridges with abd (HEP) ptb 10x            SL clams (HEP) 10x ea side ptb            SL abduction nv            SLR nv            Adduction iso nv                                      Ther Ex 7/5            PPU (HEP) 10x            bike nv            LP nv            Lateral walks nv                                                                Ther Activity 7/5            steps             marching nv            Gait Training 7/5                                      Modalities 7/5

## 2023-07-12 ENCOUNTER — OFFICE VISIT (OUTPATIENT)
Dept: PHYSICAL THERAPY | Facility: CLINIC | Age: 53
End: 2023-07-12
Payer: COMMERCIAL

## 2023-07-12 DIAGNOSIS — M53.3 SACROILIAC JOINT PAIN: Primary | ICD-10-CM

## 2023-07-12 DIAGNOSIS — M79.18 BUTTOCK PAIN: ICD-10-CM

## 2023-07-12 PROCEDURE — 97112 NEUROMUSCULAR REEDUCATION: CPT

## 2023-07-12 PROCEDURE — 97110 THERAPEUTIC EXERCISES: CPT

## 2023-07-12 NOTE — PROGRESS NOTES
Daily Note     Today's date: 2023  Patient name: Joanne Mendez  : 1970  MRN: [id number]  Referring provider: Francois Curtis  Dx:   Encounter Diagnosis     ICD-10-CM    1. Sacroiliac joint pain  M53.3       2. Buttock pain  M79.18                      Subjective: Pt presents to PT reporting pain in low back and buttocks remains. She reports pain post HEP with PPU however she reports decreased pain in B buttocks. Pt reports compliance with HEP. Objective: See treatment diary below      Assessment: Pt demonstrates fair tolerance to progressions reporting pain returns to buttocks when performing TE. Pt does reports some relief with prone push ups. Pt demonstrates appropriate tolerance to TE performed. Patient demonstrated fatigue post treatment, exhibited good technique with therapeutic exercises and would benefit from continued PT to increase flexibility, strength and function. Plan: Continue per plan of care.       Precautions: HTN, tricompartment OA of R knee, complex tear of medial meniscus of L knee, OA of L knee, patellofemoral disorder of L knee, chronic pain of L knee, obesity, migraine      Manuals            SIJ supine to sit assess  nv NV                                                  Neuro Re-Ed            Bridges with abd (HEP) ptb 10x otb 15x           SL clams (HEP) 10x ea side ptb 15x ea side           SL abduction nv            SLR nv 15x ea           Adduction iso nv 5" x 15                                     Ther Ex            PPU (HEP) 10x 10x pre, x 10 post TE           bike nv 6'           LP nv            Lateral walks nv 3 laps                                                               Ther Activity            steps             marching nv 15x           Gait Training                                       Modalities

## 2023-07-19 ENCOUNTER — APPOINTMENT (OUTPATIENT)
Dept: PHYSICAL THERAPY | Facility: CLINIC | Age: 53
End: 2023-07-19
Payer: COMMERCIAL

## 2023-07-25 ENCOUNTER — OFFICE VISIT (OUTPATIENT)
Dept: BARIATRICS | Facility: CLINIC | Age: 53
End: 2023-07-25
Payer: COMMERCIAL

## 2023-07-25 VITALS
HEART RATE: 82 BPM | WEIGHT: 198.2 LBS | BODY MASS INDEX: 35.12 KG/M2 | DIASTOLIC BLOOD PRESSURE: 91 MMHG | SYSTOLIC BLOOD PRESSURE: 129 MMHG | RESPIRATION RATE: 16 BRPM | HEIGHT: 63 IN

## 2023-07-25 DIAGNOSIS — I10 ESSENTIAL HYPERTENSION: ICD-10-CM

## 2023-07-25 DIAGNOSIS — E66.09 CLASS 2 OBESITY DUE TO EXCESS CALORIES WITHOUT SERIOUS COMORBIDITY WITH BODY MASS INDEX (BMI) OF 35.0 TO 35.9 IN ADULT: Primary | ICD-10-CM

## 2023-07-25 DIAGNOSIS — R73.09 ABNORMAL GLUCOSE: ICD-10-CM

## 2023-07-25 PROBLEM — E66.812 CLASS 2 OBESITY DUE TO EXCESS CALORIES WITHOUT SERIOUS COMORBIDITY WITH BODY MASS INDEX (BMI) OF 35.0 TO 35.9 IN ADULT: Status: ACTIVE | Noted: 2023-06-09

## 2023-07-25 PROCEDURE — 99203 OFFICE O/P NEW LOW 30 MIN: CPT | Performed by: PHYSICIAN ASSISTANT

## 2023-07-25 NOTE — PATIENT INSTRUCTIONS
Can use fairlife milk to have more protein    Control del peso   LO QUE NECESITA SABER:   ¿Por qué es importante controlar mi peso corporal? Tener sobrepeso aumenta castro riesgo de presentar problemas de jose guadalupe vick enfermedades del corazón, hipertensión, diabetes tipo 2 y ciertos tipos de cáncer. También puede aumentar castro riesgo de presentar osteoartritis, apnea del sueño y otros problemas respiratorios. Trate de bajar de peso de forma gradual y [place]. Incluso reece mínima pérdida de peso puede disminuir castro riesgo de problemas de [place]. ¿Cuáles son los riesgos del sobrepeso? El exceso de peso puede causar muchos problemas de [place], entre ellos los siguientes:  Diabetes (nivel alto de azúcar en la kailee)    Presión arterial allyssa o colesterol alto    Enfermedad cardíaca    Derrame cerebral    Enfermedades del hígado o de la vesícula biliar    Cáncer de colon, de pecho, de próstata, de hígado o de riñón    La apnea del sueño    Artritis o gota    ¿Qué necesito saber acerca de las pruebas de detección? Las pruebas de detección se realizan para comprobar la existencia de enfermedades antes de que se presenten signos o síntomas. Si tiene entre 28 y 79 años, puede comprobar castro nivel de azúcar en la kailee cada 3 años para detectar signos de prediabetes o diabetes. Castro médico tomará castro presión arterial en cada consulta. La presión arterial allyssa puede conducir a un derrame cerebral o a otros problemas. Castro médico puede comprobar si hay signos de enfermedad cardíaca, cáncer u otros problemas de jose guadalupe. ¿Cómo puedo bajar de peso de [place] forma new? Reece forma new y saludable para perder peso es consumir menos calorías y realizar reece actividad física en forma regular. Usted puede perder hasta 1 tisha por semana al reducir el consumo de 500 calorías cada día. Usted puede reducir el consumo de calorías al comer porciones más pequeñas o eliminar los alimentos con alto contenido de calorías.  [address] etiquetas para determinar la cantidad de calorías que contienen los alimentos que consume. También puede quemar calorías al realizar ejercicio vick caminar, nadar o montar en bicicleta. Es más probable que usted [employer] peso si hace de estos cambios parte de castro estilo de demar. Realice reece actividad física por lo menos 30 minutos al día, la mayoría de los días de la [place]. Usted también puede realizar más actividad física usando las escaleras en vez de los ascensores o estacionarse más lejos cuando Donis Ducking a las tiendas. Pregunte a castro médico acerca del mejor plan de ejercicio para usted. ¿Cuál es un plan de alimentación qué me puede ayudar a controlar mi peso? Un plan de alimentación saludable incluye reece variedad de alimentos, contiene más pocas calorías y lo [place] a estar saludable. Un plan de alimentación saludable incluye lo siguiente:     Consuma alimentos de grano integral con más frecuencia. Un plan de alimentación saludable debe contener alimentos con fibra. La fibra es la parte de las frutas, verduras y granos que castro cuerpo no puede digerir. Los alimentos de granos integrales son saludables y suministran fibra adicional a castro Floreen Boot. Algunos ejemplos de alimentos de granos integrales son los panes integrales, pastas integrales, la aileen, el arroz integral y anna de bulgur. Consuma reece variedad de verduras todos los chantelle. [facility] [facility], [facility], [facility] y [place]. Coma verduras anaranjadas vick las zanahorias, sylvain dulces y calabaza de invierno. Consuma reece variedad de frutas todos los [facility]. Escoja frutas frescas o enlatadas en castro propio jugo o con jugo bajo en [place] en vez de jugo. El [place] de frutas tiene [address] o mirza nada de [place]. Consuma productos lácteos con bajo contenido de [place]. [address] [place],9D de 1%. Consuma yogur descremado y requesón (cottage) semidescremado.  Trate de consumir quesos descremados vick el klebero CHAYITO ARCHER y [address] semidescremado. Seleccione marla y otros alimentos con proteínas bajos en grasa. Escoja frijoles u [address] Seleccione pescado, carne de aves sin piel (vick el nabeel o [place]), light de carne Zahra (de res o de cerdo). Antes de cocinar las marla o las aves césar cualquier parte de grasa visible. Use menos grasas y aceites. Trate de hornear los alimentos en lugar de freírlos. Agregue a las [address], vick Link, crema [place], condimentos para [place] y [place]. Consuma menos alimentos de alto tenor graso. Coma menos alimentos altos en grasa vick las sylvain fritas, donas, helados y pasteles. Consuma menos dulces. Limite los alimentos y las bebidas con un gran contenido de azúcar. Estos incluyen los caramelos, galletas, gaseosas normales y bebidas endulzadas. ¿Cuáles son las otras formas en que puedo disminuir las calorías? Reduzca el tamaño de las porciones. Use platos pequeños para servirse porciones pequeñas. No coma segundas porciones. Cuando coma en un restaurante, pida reece Sarita Hollering y guarde en zeinab la mitad de la comida antes de empezar a comer. Comparta con alguien un plato de entrada. Reemplace los bocadillos o meriendas altos en calorías por los saludables de menos calorías. Escoja frutas frescas, verduras, galletas de arroz bajo en grasa o palomitas de maíz en lugar de comer sylvain fritas de paquete, nueces o dulces de chocolate. [place] agua o bebidas dietéticas en lugar de las endulzadas. No vaya al carpenter cuando tenga hambre. Usted podría ser más propenso a elegir alimentos no saludables. Lleve reece lista de alimentos saludables y vaya de compras después de vincent comido. Coma kimmie comidas regularmente. No se salte ninguna comida. No omita ninguna comida porque esto puede conducir a comer más a reece hora más tarde del día. [place]win podría traerle problemas para perder peso.  Si no tiene tiempo para hacer comidas regulares, consuma un refrigerio saludable. Hable con un dietista para que lo ayude a crear un plan de comidas y un horario que shwetha adecuados para usted. ¿Qué más debería tener en cuenta mientras trato de bajar de peso? Esté consciente de las situaciones que podrían ocasionarle ganas de comer en exceso, vick el comer mientras joe la televisión. Busque formas para evitar estas situaciones. Por ejemplo, leer un libro, caminar o hacer trabajos manuales. Reúnase con un tena de apoyo o con personas que también están tratando de bajar de [place]. [place] le puede ayudar a mantenerse motivado y continuar progresando en castro objetivo de perder peso. ACUERDOS SOBRE CASTRO CUIDADO:   Usted tiene el derecho de ayudar a planear castro cuidado. Aprenda todo lo que pueda sobre castro condición y ivck darle tratamiento. Discuta kimmie opciones de tratamiento con kimmie médicos para decidir el cuidado que usted desea recibir. Usted siempre tiene el derecho de rechazar el tratamiento. Esta información es sólo para uso en educación. Castro intención no es darle un consejo médico sobre enfermedades o tratamientos. Colsulte con castro [place] Sorrel farmacéutico antes de seguir cualquier régimen médico para saber si es seguro y efectivo para usted. © Copyright Magalene Forward 2022 Information is for End User's use only and may not be sold, redistributed or otherwise used for commercial purposes.

## 2023-07-25 NOTE — PROGRESS NOTES
Assessment/Plan:    Essential hypertension  On nifedipine but not taking consistently. Recommend talking to PCP about alternatives if having side effects  -should improve with weight loss, dietary, and lifestyle changes      Class 2 obesity due to excess calories without serious comorbidity with body mass index (BMI) of 35.0 to 35.9 in adult  -Discussed options of HealthyCORE-Intensive Lifestyle Intervention Program, Very Low Calorie Diet-VLCD and Conservative Program and the role of weight loss medications. NOT a surgical candidate due to having only 1 weight related medical condition  -Initial weight loss goal of 5-10% weight loss for improved health  -NEEDS LABS- cmp, a1c, lipid, TSH  -Labs reviewed from 2022 all within acceptable limits except for lipids.    - STOP BANG-2/8    -Patient is interested in pursuing conservative plan  -Calorie goals (1000), sample menu (1661-6025 calories), portion size guidelines, and food logging reviewed with the patient. Goals:  -Food log (ie.) www.Mobivox.com,Sustaination,eFolder,Stance. com,etc. -1000  -No sugary beverages. At least 64oz of water daily. -- discussed trying to have a protein with breakfast and recommend having 2 servings of vegetables daily    Discussed medication options and would avoid phentermine due to HTN. Currently on wellbutrin  Discussed options of saxenda vs wegovy. To start wegovy. Patient denies personal and family history of MCT and MEN2 tumors. Patient denies personal history of pancreatitis. Side effects discussed but not limited to diarrhea, bloating, constipation, GI upset, heartburn, increased heart rate, headache, low blood sugar, fatigue and dizziness. Titration and medication administration discussed.   Discussed supply issue for wegovy and contacting the office after 2nd injection  Currently post menopausal  Initial Weight:198.2  Goal Weight:150            Follow up in approximately 6 week nurse visit and 4 month with Non-Surgical Physician/Advanced Practitioner. Diagnoses and all orders for this visit:    Class 2 obesity due to excess calories without serious comorbidity with body mass index (BMI) of 35.0 to 35.9 in adult  -     Semaglutide-Weight Management (WEGOVY) 0.25 MG/0.5ML; Inject 0.5 mL (0.25 mg total) under the skin once a week  -     TSH, 3rd generation; Future  -     Comprehensive metabolic panel; Future  -     Hemoglobin A1C; Future  -     Lipid panel; Future    Essential hypertension  -     Semaglutide-Weight Management (WEGOVY) 0.25 MG/0.5ML; Inject 0.5 mL (0.25 mg total) under the skin once a week  -     TSH, 3rd generation; Future  -     Comprehensive metabolic panel; Future  -     Hemoglobin A1C; Future  -     Lipid panel; Future    Abnormal glucose  -     TSH, 3rd generation; Future  -     Comprehensive metabolic panel; Future  -     Hemoglobin A1C; Future  -     Lipid panel; Future          Subjective:   Chief Complaint   Patient presents with   • Consult     MWM Consult; Gw-150lb; wasit-39.5in; stop bang-2/8       Patient ID: Summer Mcnally  is a 47 y.o. female with excess weight/obesity here to pursue weight management. Past Medical History:   Diagnosis Date   • Arthritis    • Bacterial vaginosis    • Bilateral bunions    • COVID-19    • Migraine    • Obesity    • Varicella        HPI: Here for MWM consult  moshe duggan    She had surgery in her knee and had weight gain since then and with having cortisone injections    She is trying to change her diet and not eat after 6PM.  In the past she did weight loss teas. She was also on phentermine in the past from Dr. Abi Malone. She did well but then once she started to get the injections she was no longer losing as much weight. She is avoiding steroid injections due to weight gain.  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 S (10AM): watermelon or mix cranberry, almonds, walnut or protein bar  L:chicken, sometiems rice. Sometimes vegetable but does not like a lot. Will do brocoli, cauliflower, osmar lettuce, tomato  S:applesauce or jello or rice pudding  S: sometimes picking at things. Varies but can be sweets at times    The following portions of the patient's history were reviewed and updated as appropriate:   She  has a past medical history of Arthritis, Bacterial vaginosis, Bilateral bunions, COVID-19, Migraine, Obesity, and Varicella.   She   Patient Active Problem List    Diagnosis Date Noted   • Class 2 obesity due to excess calories without serious comorbidity with body mass index (BMI) of 35.0 to 35.9 in adult 06/09/2023   • Essential hypertension 05/28/2021   • Patellofemoral disorder of left knee 04/27/2020   • HPV in female 03/06/2020   • Complex tear of medial meniscus of left knee as current injury 09/02/2019   • Osteoarthritis of left knee 09/02/2019   • Chronic pain of left knee 08/15/2019   • Annual physical exam 05/16/2019   • Tricompartment osteoarthritis of right knee 05/26/2017     She  has a past surgical history that includes pr arthrs kne surg w/meniscectomy med/lat w/shvg (Left, 02/12/2020); pr corrj hallux valgus w/sesmdc w/dist metar osteot (Bilateral, 12/23/2022); pr ostectomy prtl 5th metar head spx (Bilateral, 12/23/2022); and pr correction hammertoe (Right, 12/23/2022). Her family history includes Cancer in her father; Hypertension in her mother; No Known Problems in her daughter, maternal aunt, maternal grandfather, maternal grandmother, paternal aunt, paternal grandfather, paternal grandmother, and sister; Prostate cancer in her father. She  reports that she has never smoked. She has never used smokeless tobacco. She reports current alcohol use of about 1.0 standard drink of alcohol per week. She reports that she does not use drugs.   Current Outpatient Medications   Medication Sig Dispense Refill   • Aspirin-Acetaminophen-Caffeine (EXCEDRIN PO) Take by mouth as needed     • Biotin 10 MG CAPS Take 1 capsule by mouth daily     • buPROPion (WELLBUTRIN XL) 150 mg 24 hr tablet Take 150 mg by mouth every morning     • clindamycin (CLINDAGEL) 1 % gel      • clotrimazole-betamethasone (LOTRISONE) 1-0.05 % cream Apply 1 application topically 2 (two) times a day To affected area 45 g 2   • COLLAGEN PO Take by mouth daily     • Cyanocobalamin (VITAMIN B 12 PO) Take by mouth every other day     • gabapentin (NEURONTIN) 300 mg capsule Take 1 capsule (300 mg total) by mouth daily at bedtime 30 capsule 0   • Misc Natural Products (APPLE CIDER VINEGAR DIET PO) Take by mouth daily     • naproxen (NAPROSYN) 500 mg tablet Take 1 tablet (500 mg total) by mouth 2 (two) times a day with meals 60 tablet 0   • NIFEdipine ER (ADALAT CC) 30 MG 24 hr tablet Take 1 tablet (30 mg total) by mouth daily 90 tablet 3   • Omega-3 1000 MG CAPS Take 1 capsule by mouth daily     • Semaglutide-Weight Management (WEGOVY) 0.25 MG/0.5ML Inject 0.5 mL (0.25 mg total) under the skin once a week 2 mL 0   • valACYclovir (VALTREX) 500 mg tablet Take 500 mg by mouth daily     • VITAMIN A PO Take by mouth every other day     • VITAMIN D PO Take 1 tablet by mouth in the morning     • ibuprofen (MOTRIN) 600 mg tablet Take 600 mg by mouth every 6 (six) hours as needed (Patient not taking: Reported on 7/25/2023) No current facility-administered medications for this visit. Current Outpatient Medications on File Prior to Visit   Medication Sig   • Aspirin-Acetaminophen-Caffeine (EXCEDRIN PO) Take by mouth as needed   • Biotin 10 MG CAPS Take 1 capsule by mouth daily   • buPROPion (WELLBUTRIN XL) 150 mg 24 hr tablet Take 150 mg by mouth every morning   • clindamycin (CLINDAGEL) 1 % gel    • clotrimazole-betamethasone (LOTRISONE) 1-0.05 % cream Apply 1 application topically 2 (two) times a day To affected area   • COLLAGEN PO Take by mouth daily   • Cyanocobalamin (VITAMIN B 12 PO) Take by mouth every other day   • gabapentin (NEURONTIN) 300 mg capsule Take 1 capsule (300 mg total) by mouth daily at bedtime   • Misc Natural Products (APPLE CIDER VINEGAR DIET PO) Take by mouth daily   • naproxen (NAPROSYN) 500 mg tablet Take 1 tablet (500 mg total) by mouth 2 (two) times a day with meals   • NIFEdipine ER (ADALAT CC) 30 MG 24 hr tablet Take 1 tablet (30 mg total) by mouth daily   • Omega-3 1000 MG CAPS Take 1 capsule by mouth daily   • valACYclovir (VALTREX) 500 mg tablet Take 500 mg by mouth daily   • VITAMIN A PO Take by mouth every other day   • VITAMIN D PO Take 1 tablet by mouth in the morning   • ibuprofen (MOTRIN) 600 mg tablet Take 600 mg by mouth every 6 (six) hours as needed (Patient not taking: Reported on 7/25/2023)     No current facility-administered medications on file prior to visit. She is allergic to penicillins. .    Review of Systems   Constitutional: Negative for fever. Respiratory: Negative for shortness of breath. Cardiovascular: Negative for chest pain and palpitations. Gastrointestinal: Negative for abdominal pain, constipation, diarrhea and vomiting. Genitourinary: Negative for difficulty urinating. Musculoskeletal: Positive for arthralgias. Negative for back pain. Skin: Negative for rash. Neurological: Negative for headaches.    Psychiatric/Behavioral: Negative for dysphoric mood. The patient is not nervous/anxious. Objective:    /91   Pulse 82   Resp 16   Ht 5' 3" (1.6 m)   Wt 89.9 kg (198 lb 3.2 oz)   BMI 35.11 kg/m²     Physical Exam  Vitals and nursing note reviewed. Constitutional:       General: She is not in acute distress. Appearance: She is well-developed. She is obese. HENT:      Head: Normocephalic and atraumatic. Eyes:      Conjunctiva/sclera: Conjunctivae normal.   Neck:      Thyroid: No thyromegaly. Pulmonary:      Effort: Pulmonary effort is normal. No respiratory distress. Skin:     Findings: No rash (visible). Neurological:      Mental Status: She is alert and oriented to person, place, and time.    Psychiatric:         Mood and Affect: Mood normal.         Behavior: Behavior normal.

## 2023-07-25 NOTE — ASSESSMENT & PLAN NOTE
On nifedipine but not taking consistently.  Recommend talking to PCP about alternatives if having side effects  -should improve with weight loss, dietary, and lifestyle changes

## 2023-07-25 NOTE — ASSESSMENT & PLAN NOTE
-Discussed options of HealthyCORE-Intensive Lifestyle Intervention Program, Very Low Calorie Diet-VLCD and Conservative Program and the role of weight loss medications. NOT a surgical candidate due to having only 1 weight related medical condition  -Initial weight loss goal of 5-10% weight loss for improved health  -NEEDS LABS- cmp, a1c, lipid, TSH  -Labs reviewed from 2022 all within acceptable limits except for lipids.    - STOP BANG-2/8    -Patient is interested in pursuing conservative plan  -Calorie goals (1000), sample menu (6171-4887 calories), portion size guidelines, and food logging reviewed with the patient. Goals:  -Food log (ie.) www.Zindigo.com,[employer],Panvideait.com,[employer]. com,etc. -1000  -No sugary beverages. At least 64oz of water daily. -- discussed trying to have a protein with breakfast and recommend having 2 servings of vegetables daily    Discussed medication options and would avoid phentermine due to HTN. Currently on wellbutrin  Discussed options of saxenda vs wegovy. To start wegovy. Patient denies personal and family history of MCT and MEN2 tumors. Patient denies personal history of pancreatitis. Side effects discussed but not limited to diarrhea, bloating, constipation, GI upset, heartburn, increased heart rate, headache, low blood sugar, fatigue and dizziness. Titration and medication administration discussed.   Discussed supply issue for wegovy and contacting the office after 2nd injection  Currently post menopausal  Initial Weight:198.2  Goal Weight:150

## 2023-07-26 ENCOUNTER — OFFICE VISIT (OUTPATIENT)
Dept: PHYSICAL THERAPY | Facility: CLINIC | Age: 53
End: 2023-07-26
Payer: COMMERCIAL

## 2023-07-26 ENCOUNTER — TELEPHONE (OUTPATIENT)
Dept: BARIATRICS | Facility: CLINIC | Age: 53
End: 2023-07-26

## 2023-07-26 DIAGNOSIS — M53.3 SACROILIAC JOINT PAIN: Primary | ICD-10-CM

## 2023-07-26 DIAGNOSIS — M17.12 PRIMARY OSTEOARTHRITIS OF LEFT KNEE: ICD-10-CM

## 2023-07-26 DIAGNOSIS — M79.18 BUTTOCK PAIN: ICD-10-CM

## 2023-07-26 PROCEDURE — 97140 MANUAL THERAPY 1/> REGIONS: CPT

## 2023-07-26 PROCEDURE — 97110 THERAPEUTIC EXERCISES: CPT

## 2023-07-26 PROCEDURE — 97112 NEUROMUSCULAR REEDUCATION: CPT

## 2023-07-26 RX ORDER — NAPROXEN 500 MG/1
500 TABLET ORAL 2 TIMES DAILY WITH MEALS
Qty: 60 TABLET | Refills: 0 | Status: SHIPPED | OUTPATIENT
Start: 2023-07-26

## 2023-07-26 NOTE — TELEPHONE ENCOUNTER
Patient and pharm was informed regarding the [facility] approval from 07/26/2023-02/21/2024. She was alos informed about the 4-5% weight loss requirement for renewal purposes.

## 2023-07-26 NOTE — PROGRESS NOTES
Daily Note     Today's date: 2023  Patient name: Fatoumata Thomas  : 1970  MRN: [id number]  Referring provider: Pravin Ravi  Dx:   Encounter Diagnosis     ICD-10-CM    1. Sacroiliac joint pain  M53.3       2. Buttock pain  M79.18           Start Time: 8673  Stop Time: 5837  Total time in clinic (min): 38 minutes    Subjective: Pt reports some pain present upon arrival, but reports she has been consistent with HEP. Objective: See treatment diary below    Supine to sit: (+) L posterior rotation; (-) following SIJ MET      Assessment: Tolerated treatment well. Patient tolerated LP added this session but was limited in terms of weight due to L knee pain. Pt displayed good form during SL abduction this session. SL clams and bridges were progressed with increased resistance. Height during bridges was limited due to weakness. SIJ was assessed this visit as above. Continue to progress pt as tolerated next visit. Plan: Continue per plan of care.       Precautions: HTN, tricompartment OA of R knee, complex tear of medial meniscus of L knee, OA of L knee, patellofemoral disorder of L knee, chronic pain of L knee, obesity, migraine      Manuals           SIJ supine to sit assess  nv NV NS           SIJ MET   L hip flex, R hip ext                                    Neuro Re-Ed           Bridges with abd (HEP) ptb 10x otb 15x gtb 2x15          SL clams (HEP) 10x ea side ptb 15x ea side 15x ea side          SL abduction nv  15x ea side          SLR nv 15x ea           Adduction iso nv 5" x 15 15x5" hold                                    Ther Ex           PPU (HEP) 10x 10x pre, x 10 post TE           bike nv 6' 6'          LP nv  2x15 105#           Lateral walks nv 3 laps                                                               Ther Activity           steps             marching nv 15x           Gait Training  Modalities 7/5 7/26

## 2023-07-27 ENCOUNTER — OFFICE VISIT (OUTPATIENT)
Dept: PODIATRY | Facility: CLINIC | Age: 53
End: 2023-07-27
Payer: COMMERCIAL

## 2023-07-27 VITALS
RESPIRATION RATE: 18 BRPM | DIASTOLIC BLOOD PRESSURE: 90 MMHG | SYSTOLIC BLOOD PRESSURE: 150 MMHG | BODY MASS INDEX: 35.08 KG/M2 | HEIGHT: 63 IN | WEIGHT: 198 LBS | HEART RATE: 92 BPM

## 2023-07-27 DIAGNOSIS — G62.9 PERIPHERAL NERVE DISORDER: Primary | ICD-10-CM

## 2023-07-27 PROCEDURE — 99212 OFFICE O/P EST SF 10 MIN: CPT | Performed by: PODIATRIST

## 2023-07-27 RX ORDER — GABAPENTIN 300 MG/1
300 CAPSULE ORAL 3 TIMES DAILY
Qty: 90 CAPSULE | Refills: 0 | Status: SHIPPED | OUTPATIENT
Start: 2023-07-27 | End: 2023-08-26

## 2023-07-27 NOTE — PROGRESS NOTES
Patient presents for assessment of right foot. Juana Long acted as . Patient was placed on gabapentin 300 milligrams at bedtime for symptoms of neuropathy. She thinks that helped a little. Patient did not have adverse effects from the medication. Patient relates a feeling of heaviness in the right foot as well as tingling. She has returned to work but in a limited capacity. The left foot is comfortable. Patient does relate a history of a back disorder. Explained to patient that her symptoms are neurologic in origin at this time. She was told to increase gabapentin dosage to 300 mg 3 times daily and appropriate prescription was given for 1 month. Work limitations are still in effect. Patient will be rescheduled in 1 month.

## 2023-08-02 ENCOUNTER — OFFICE VISIT (OUTPATIENT)
Dept: PHYSICAL THERAPY | Facility: CLINIC | Age: 53
End: 2023-08-02
Payer: COMMERCIAL

## 2023-08-02 DIAGNOSIS — M53.3 SACROILIAC JOINT PAIN: Primary | ICD-10-CM

## 2023-08-02 DIAGNOSIS — M79.18 BUTTOCK PAIN: ICD-10-CM

## 2023-08-02 PROCEDURE — 97112 NEUROMUSCULAR REEDUCATION: CPT

## 2023-08-02 PROCEDURE — 97110 THERAPEUTIC EXERCISES: CPT

## 2023-08-02 NOTE — PROGRESS NOTES
Daily Note     Today's date: 2023  Patient name: Erin Almodovar  : 1970  MRN: [id number]  Referring provider: Dax Quarles  Dx:   Encounter Diagnosis     ICD-10-CM    1. Sacroiliac joint pain  M53.3       2. Buttock pain  M79.18           Start Time: 6780  Stop Time: 1220  Total time in clinic (min): 49 minutes    Subjective: Pt reports she has some points in the day when she feels good and others when she feels the pain. She reports a lot of pain with walking yesterday. Objective: See treatment diary below  Supine to sit: (+) L posterior rotation  (-) following MET      Assessment: Tolerated treatment well. Patient tried paloff press this session with good form following initial cuing. Lateral walks were progressed with increased resistance this session with good control following VC. PPU were performed with increased SIJ pain noted following, though no radiating symptoms. Pt continues to be challenged with bridges with decreased height noted. Supine marches were also added this session with good form. SIJ MET was performed again this session with negative supine to sit following. Continue to progress pt as tolerated next visit. Plan: Continue per plan of care.       Precautions: HTN, tricompartment OA of R knee, complex tear of medial meniscus of L knee, OA of L knee, patellofemoral disorder of L knee, chronic pain of L knee, obesity, migraine      Manuals          SIJ supine to sit assess  nv NV NS  NS         SIJ MET   L hip flex, R hip ext L hip flex, R hip ext                                   Neuro Re-Ed  8         Bridges with abd (HEP) ptb 10x otb 15x gtb 2x15 2x15 gtb         SL clams (HEP) 10x ea side ptb 15x ea side 15x ea side gtb 2x15x ea side         SL abduction nv  15x ea side nv         SLR nv 15x ea           Adduction iso nv 5" x 15 15x5" hold          paloff press     7# 15x ea side                       Ther Ex  8/ PPU (HEP) 10x 10x pre, x 10 post TE  2x10         bike nv 6' 6' 6'         LP nv  2x15 105#  nv         Lateral walks nv 3 laps  ptb 5x at mirror         Supine hip flex                                                    Ther Activity 7/5 7/12 7/26 8/2         steps             marching nv 15x           Gait Training 7/5 7/26 8/2                                   Modalities 7/5 7/26 8/2

## 2023-08-09 ENCOUNTER — OFFICE VISIT (OUTPATIENT)
Dept: PHYSICAL THERAPY | Facility: CLINIC | Age: 53
End: 2023-08-09
Payer: COMMERCIAL

## 2023-08-09 DIAGNOSIS — M53.3 SACROILIAC JOINT PAIN: Primary | ICD-10-CM

## 2023-08-09 DIAGNOSIS — M79.18 BUTTOCK PAIN: ICD-10-CM

## 2023-08-09 PROCEDURE — 97110 THERAPEUTIC EXERCISES: CPT

## 2023-08-09 PROCEDURE — 97112 NEUROMUSCULAR REEDUCATION: CPT

## 2023-08-09 NOTE — PROGRESS NOTES
Daily Note     Today's date: 2023  Patient name: Dina Quintanilla  : 1970  MRN: [id number]  Referring provider: Vicente Crawley  Dx:   Encounter Diagnosis     ICD-10-CM    1. Sacroiliac joint pain  M53.3       2. Buttock pain  M79.18           Start Time: 1131  Stop Time: 1217  Total time in clinic (min): 46 minutes    Subjective: Pt reports improvement in back pain intensity, though reports continued pain with sitting and standing for long periods of time. Objective: See treatment diary below  Supine to sit: (+) L posterior rotation; (-) following MET      Assessment: Tolerated treatment well. Patient progressed bridges with SL bridges for second set. Pt displayed increased difficulty on the RLE compared to the LLE. Good form was noted during SL abduction this session. LP was progressed with increased weight with good tolerance. L knee pain limited any further progressions in weight. Supine hip flexion was added to exercises with initial cuing for form required. Supine to sit was assessed again this session with continued positive L posterior rotation, though less extreme compared to previous sessions. Negative test resulted following SIJ MET with improvement in muscle engagement this session. Continue to progress pt as tolerated next visit. Plan: Continue per plan of care.       Precautions: HTN, tricompartment OA of R knee, complex tear of medial meniscus of L knee, OA of L knee, patellofemoral disorder of L knee, chronic pain of L knee, obesity, migraine      Manuals         SIJ supine to sit assess  nv NV NS  NS NS        SIJ MET   L hip flex, R hip ext L hip flex, R hip ext L hip flex, R hip ext                                  Neuro Re-Ed         Bridges with abd (HEP) ptb 10x otb 15x gtb 2x15 2x15 gtb 15x, SL 15x ea side        SL clams (HEP) 10x ea side ptb 15x ea side 15x ea side gtb 2x15x ea side         SL abduction nv  15x ea side nv 2x10 1# ea side        SLR nv 15x ea   2x15 ea side 1#        Adduction iso nv 5" x 15 15x5" hold          paloff press     7# 15x ea side                       Ther Ex 7/5 7/12 7/26 8/2 8/9        PPU (HEP) 10x 10x pre, x 10 post TE  2x10 2x10         bike nv 6' 6' 6' 6'        LP nv  2x15 105#  nv 125# 2x10        Lateral walks nv 3 laps  ptb 5x at mirror         Supine hip flex     ptb 2x10 ea side                                               Ther Activity 7/5 7/12 7/26 8/2 8/9        steps             marching nv 15x           Gait Training 7/5 7/26 8/2 8/9                                  Modalities 7/5 7/26 8/2 8/9

## 2023-08-16 ENCOUNTER — APPOINTMENT (OUTPATIENT)
Dept: PHYSICAL THERAPY | Facility: CLINIC | Age: 53
End: 2023-08-16
Payer: COMMERCIAL

## 2023-08-18 ENCOUNTER — APPOINTMENT (OUTPATIENT)
Dept: PHYSICAL THERAPY | Facility: CLINIC | Age: 53
End: 2023-08-18
Payer: COMMERCIAL

## 2023-08-22 DIAGNOSIS — M17.12 PRIMARY OSTEOARTHRITIS OF LEFT KNEE: ICD-10-CM

## 2023-08-22 RX ORDER — NAPROXEN 500 MG/1
500 TABLET ORAL 2 TIMES DAILY WITH MEALS
Qty: 60 TABLET | Refills: 0 | Status: SHIPPED | OUTPATIENT
Start: 2023-08-22

## 2023-08-23 ENCOUNTER — OFFICE VISIT (OUTPATIENT)
Dept: PHYSICAL THERAPY | Facility: CLINIC | Age: 53
End: 2023-08-23
Payer: COMMERCIAL

## 2023-08-23 DIAGNOSIS — M79.18 BUTTOCK PAIN: ICD-10-CM

## 2023-08-23 DIAGNOSIS — M53.3 SACROILIAC JOINT PAIN: Primary | ICD-10-CM

## 2023-08-23 PROCEDURE — 97112 NEUROMUSCULAR REEDUCATION: CPT

## 2023-08-23 PROCEDURE — 97110 THERAPEUTIC EXERCISES: CPT

## 2023-08-23 NOTE — PROGRESS NOTES
Daily Note     Today's date: 2023  Patient name: Pierce Jones  : 1970  MRN: [id number]  Referring provider: Pk Melendrez  Dx:   Encounter Diagnosis     ICD-10-CM    1. Sacroiliac joint pain  M53.3       2. Buttock pain  M79.18           Start Time: 259  Stop Time: 1236  Total time in clinic (min): 64 minutes    Subjective: Pt reports to therapy reporting a lot of pain yesterday in her R back/buttock. Pt reports her R buttock is currently what is bothering her today. Objective: See treatment diary below      Assessment: Tolerated treatment well. Due to continued report of buttock pain, piriformis stretch was added this session. Patient progressed SL abduction this session with increased repetitions and TC for proper form. Paloff press was progressed with increased weight with good tolerance. LP was also progressed with increased weight. SL LP was tried with increased difficulty RLE compared to the LLE. Pt was visibly challenged with side planks and lateral step ups this session. She was provided with updated resistance band and exercises for home and educated on performance. She displays significant improvement in function as seen this session by increase in FOTO score. Pt will continue to benefit from therapy to improve strength, function, and pain. Continue to progress pt as tolerated next visit. Plan: Continue per plan of care.       Precautions: HTN, tricompartment OA of R knee, complex tear of medial meniscus of L knee, OA of L knee, patellofemoral disorder of L knee, chronic pain of L knee, obesity, migraine      Manuals        SIJ supine to sit assess  nv NV NS  NS NS nv       SIJ MET   L hip flex, R hip ext L hip flex, R hip ext L hip flex, R hip ext nv                    FOTO      !!       Neuro Re-Ed        Bridges with abd (HEP) ptb 10x otb 15x gtb 2x15 2x15 gtb 15x, SL 15x ea side 15x ea side       SL clams (HEP) 10x ea side ptb 15x ea side 15x ea side gtb 2x15x ea side  gtb 2x15 ea side        SL abduction nv  15x ea side nv 2x10 1# ea side 1# 15x ea side       SLR nv 15x ea   2x15 ea side 1#        Adduction iso nv 5" x 15 15x5" hold          paloff press     7# 15x ea side   10# 15x ea side                    Ther Ex 7/5 7/12 7/26 8/2 8/9 8/23       PPU (HEP) 10x 10x pre, x 10 post TE  2x10 2x10  2x10       bike nv 6' 6' 6' 6' 6'       LP nv  2x15 105#  nv 125# 2x10 135# 2x10, 65# 10x ea side SL       Lateral walks nv 3 laps  ptb 5x at mirror         Supine hip flex     ptb 2x10 ea side        Side planks       modified 3x15" ea side        Piriformis stretch      2x1' RLE                    Ther Activity 7/5 7/12 7/26 8/2 8/9 8/23       steps      2x10 RLE 0R       Pt edu      NS       marching nv 15x           Gait Training 7/5 7/26 8/2 8/9 8/23                                 Modalities 7/5 7/26 8/2 8/9 8/23

## 2023-08-27 DIAGNOSIS — E66.09 CLASS 2 OBESITY DUE TO EXCESS CALORIES WITHOUT SERIOUS COMORBIDITY WITH BODY MASS INDEX (BMI) OF 35.0 TO 35.9 IN ADULT: ICD-10-CM

## 2023-08-27 DIAGNOSIS — I10 ESSENTIAL HYPERTENSION: ICD-10-CM

## 2023-08-28 DIAGNOSIS — E66.09 CLASS 2 OBESITY DUE TO EXCESS CALORIES WITHOUT SERIOUS COMORBIDITY WITH BODY MASS INDEX (BMI) OF 35.0 TO 35.9 IN ADULT: Primary | ICD-10-CM

## 2023-08-28 RX ORDER — SEMAGLUTIDE 0.25 MG/.5ML
INJECTION, SOLUTION SUBCUTANEOUS
OUTPATIENT
Start: 2023-08-28

## 2023-08-28 NOTE — TELEPHONE ENCOUNTER
I sent in for the 0.5mg of wegovy.  She should finish the 0.25mg and if not having any side effects then go up in dose

## 2023-08-30 ENCOUNTER — APPOINTMENT (OUTPATIENT)
Dept: PHYSICAL THERAPY | Facility: CLINIC | Age: 53
End: 2023-08-30
Payer: COMMERCIAL

## 2023-08-31 ENCOUNTER — APPOINTMENT (OUTPATIENT)
Dept: LAB | Facility: MEDICAL CENTER | Age: 53
End: 2023-08-31
Payer: COMMERCIAL

## 2023-08-31 ENCOUNTER — OFFICE VISIT (OUTPATIENT)
Dept: PODIATRY | Facility: CLINIC | Age: 53
End: 2023-08-31
Payer: COMMERCIAL

## 2023-08-31 VITALS
BODY MASS INDEX: 35.08 KG/M2 | RESPIRATION RATE: 18 BRPM | SYSTOLIC BLOOD PRESSURE: 132 MMHG | HEIGHT: 63 IN | DIASTOLIC BLOOD PRESSURE: 83 MMHG | WEIGHT: 198 LBS | HEART RATE: 80 BPM

## 2023-08-31 DIAGNOSIS — I10 ESSENTIAL HYPERTENSION: ICD-10-CM

## 2023-08-31 DIAGNOSIS — G62.9 PERIPHERAL NERVE DISORDER: Primary | ICD-10-CM

## 2023-08-31 DIAGNOSIS — R73.09 ABNORMAL GLUCOSE: ICD-10-CM

## 2023-08-31 DIAGNOSIS — E66.09 CLASS 2 OBESITY DUE TO EXCESS CALORIES WITHOUT SERIOUS COMORBIDITY WITH BODY MASS INDEX (BMI) OF 35.0 TO 35.9 IN ADULT: ICD-10-CM

## 2023-08-31 LAB
ALBUMIN SERPL BCP-MCNC: 4.1 G/DL (ref 3.5–5)
ALP SERPL-CCNC: 104 U/L (ref 34–104)
ALT SERPL W P-5'-P-CCNC: 17 U/L (ref 7–52)
ANION GAP SERPL CALCULATED.3IONS-SCNC: 8 MMOL/L
AST SERPL W P-5'-P-CCNC: 18 U/L (ref 13–39)
BILIRUB SERPL-MCNC: 0.37 MG/DL (ref 0.2–1)
BUN SERPL-MCNC: 17 MG/DL (ref 5–25)
CALCIUM SERPL-MCNC: 9.4 MG/DL (ref 8.4–10.2)
CHLORIDE SERPL-SCNC: 105 MMOL/L (ref 96–108)
CHOLEST SERPL-MCNC: 170 MG/DL
CO2 SERPL-SCNC: 28 MMOL/L (ref 21–32)
CREAT SERPL-MCNC: 0.63 MG/DL (ref 0.6–1.3)
EST. AVERAGE GLUCOSE BLD GHB EST-MCNC: 114 MG/DL
GFR SERPL CREATININE-BSD FRML MDRD: 102 ML/MIN/1.73SQ M
GLUCOSE P FAST SERPL-MCNC: 92 MG/DL (ref 65–99)
HBA1C MFR BLD: 5.6 %
HDLC SERPL-MCNC: 43 MG/DL
LDLC SERPL CALC-MCNC: 112 MG/DL (ref 0–100)
NONHDLC SERPL-MCNC: 127 MG/DL
POTASSIUM SERPL-SCNC: 4.7 MMOL/L (ref 3.5–5.3)
PROT SERPL-MCNC: 7.4 G/DL (ref 6.4–8.4)
SODIUM SERPL-SCNC: 141 MMOL/L (ref 135–147)
TRIGL SERPL-MCNC: 77 MG/DL
TSH SERPL DL<=0.05 MIU/L-ACNC: 0.81 UIU/ML (ref 0.45–4.5)

## 2023-08-31 PROCEDURE — 80053 COMPREHEN METABOLIC PANEL: CPT

## 2023-08-31 PROCEDURE — 80061 LIPID PANEL: CPT

## 2023-08-31 PROCEDURE — 84443 ASSAY THYROID STIM HORMONE: CPT

## 2023-08-31 PROCEDURE — 83036 HEMOGLOBIN GLYCOSYLATED A1C: CPT

## 2023-08-31 PROCEDURE — 36415 COLL VENOUS BLD VENIPUNCTURE: CPT

## 2023-08-31 PROCEDURE — 99212 OFFICE O/P EST SF 10 MIN: CPT | Performed by: PODIATRIST

## 2023-08-31 RX ORDER — GABAPENTIN 300 MG/1
300 CAPSULE ORAL 3 TIMES DAILY
Qty: 270 CAPSULE | Refills: 0 | Status: SHIPPED | OUTPATIENT
Start: 2023-08-31 | End: 2023-11-29

## 2023-08-31 NOTE — PROGRESS NOTES
Patient presents for assessment. She has been taking gabapentin 300 mg 3 times daily for her right foot discomfort. She is doing better. She relates some drowsiness from the medication but she desires to continue with this dosage. She also desires to continue with her restrictions currently in place at her workplace. This was agreed to. A 90-day supply of gabapentin 300 mg 3 times daily was provided. Note: Language line was utilized for interpretation purposes.

## 2023-09-02 DIAGNOSIS — I10 ESSENTIAL HYPERTENSION: ICD-10-CM

## 2023-09-02 DIAGNOSIS — E66.09 CLASS 2 OBESITY DUE TO EXCESS CALORIES WITHOUT SERIOUS COMORBIDITY WITH BODY MASS INDEX (BMI) OF 35.0 TO 35.9 IN ADULT: ICD-10-CM

## 2023-09-05 RX ORDER — SEMAGLUTIDE 0.25 MG/.5ML
INJECTION, SOLUTION SUBCUTANEOUS
OUTPATIENT
Start: 2023-09-05

## 2023-09-05 NOTE — TELEPHONE ENCOUNTER
Can you see what is going on with her wegovy. She was on the 0.25mg last month and I sent in the 0.5mg last week.   [facility] is asking for 0.25mg again

## 2023-09-13 ENCOUNTER — HOSPITAL ENCOUNTER (OUTPATIENT)
Dept: RADIOLOGY | Age: 53
Discharge: HOME | End: 2023-09-13
Payer: COMMERCIAL

## 2023-09-13 ENCOUNTER — OFFICE VISIT (OUTPATIENT)
Dept: FAMILY MEDICINE CLINIC | Facility: CLINIC | Age: 53
End: 2023-09-13
Payer: COMMERCIAL

## 2023-09-13 VITALS
RESPIRATION RATE: 16 BRPM | HEART RATE: 74 BPM | HEIGHT: 63 IN | DIASTOLIC BLOOD PRESSURE: 80 MMHG | BODY MASS INDEX: 34.41 KG/M2 | WEIGHT: 194.2 LBS | SYSTOLIC BLOOD PRESSURE: 140 MMHG | OXYGEN SATURATION: 97 % | TEMPERATURE: 97.5 F

## 2023-09-13 VITALS — WEIGHT: 198 LBS | HEIGHT: 63 IN | BODY MASS INDEX: 35.08 KG/M2

## 2023-09-13 DIAGNOSIS — I10 ESSENTIAL HYPERTENSION: Primary | ICD-10-CM

## 2023-09-13 DIAGNOSIS — Z12.31 ENCOUNTER FOR SCREENING MAMMOGRAM FOR MALIGNANT NEOPLASM OF BREAST: ICD-10-CM

## 2023-09-13 DIAGNOSIS — E66.01 MORBID OBESITY (HCC): ICD-10-CM

## 2023-09-13 PROCEDURE — 77063 BREAST TOMOSYNTHESIS BI: CPT

## 2023-09-13 PROCEDURE — 77067 SCR MAMMO BI INCL CAD: CPT

## 2023-09-13 PROCEDURE — 99214 OFFICE O/P EST MOD 30 MIN: CPT | Performed by: FAMILY MEDICINE

## 2023-09-13 RX ORDER — LOSARTAN POTASSIUM 25 MG/1
25 TABLET ORAL DAILY
Qty: 30 TABLET | Refills: 5 | Status: SHIPPED | OUTPATIENT
Start: 2023-09-13

## 2023-09-13 NOTE — PROGRESS NOTES
Name: Eileen Sandy      : 1970      MRN: [id number]  Encounter Provider: Serina Mcnair MD  Encounter Date: 2023   Encounter department: [address] [place], [place] [address]     1. Essential hypertension  -     losartan (COZAAR) 25 mg tablet; Take 1 tablet (25 mg total) by mouth daily    2. Morbid obesity ([address])    Regarding patient's high blood pressure, patient was not able to tolerate her previous blood pressure medication. Patient is asked to lose weight with diet and exercise, low-salt weight etc.  Patient will hold nifedipine for now and will be started on losartan 25 mg once daily and she will start taking it at night. She will have her blood pressure rechecked in a follow-up in a month. Regarding her morbid obesity she is seeing the bariatric center treating her with injections for that. Also did annual mammogram today. Patient also did labs on 2023. Those results were discussed with the patient    Complete that was improved and her A1c is 5.6 her CMP was normal and her TSH was normal as well. .       Subjective      67-year-old female here for her weight hypertension. Patient is seen at the bariatric center as well to help her lose weight. Patient is not taking blood pressure medication. Review of Systems   Constitutional: Negative for activity change, appetite change and fatigue. HENT: Negative for congestion. Eyes: Negative for visual disturbance. Respiratory: Negative for cough and shortness of breath. Cardiovascular: Negative for chest pain. Gastrointestinal: Negative for abdominal pain. Genitourinary: Negative for dysuria. Neurological: Negative for dizziness and headaches. Psychiatric/Behavioral: The patient is not nervous/anxious.         Current Outpatient Medications on File Prior to Visit   Medication Sig   • Aspirin-Acetaminophen-Caffeine (EXCEDRIN PO) Take by mouth as needed   • Biotin 10 MG CAPS Take 1 capsule by mouth daily   • buPROPion (WELLBUTRIN XL) 150 mg 24 hr tablet Take 150 mg by mouth every morning   • clindamycin (CLINDAGEL) 1 % gel    • clotrimazole-betamethasone (LOTRISONE) 1-0.05 % cream Apply 1 application topically 2 (two) times a day To affected area   • COLLAGEN PO Take by mouth daily   • Cyanocobalamin (VITAMIN B 12 PO) Take by mouth every other day   • gabapentin (Neurontin) 300 mg capsule Take 1 capsule (300 mg total) by mouth 3 (three) times a day   • Misc Natural Products (APPLE CIDER VINEGAR DIET PO) Take by mouth daily   • naproxen (NAPROSYN) 500 mg tablet TAKE 1 TABLET BY MOUTH TWICE A DAY WITH FOOD   • NIFEdipine ER (ADALAT CC) 30 MG 24 hr tablet Take 1 tablet (30 mg total) by mouth daily   • Omega-3 1000 MG CAPS Take 1 capsule by mouth daily   • Semaglutide-Weight Management (WEGOVY) 0.5 MG/0.5ML Inject 0.5 mL (0.5 mg total) under the skin once a week   • valACYclovir (VALTREX) 500 mg tablet Take 500 mg by mouth daily   • VITAMIN A PO Take by mouth every other day   • VITAMIN D PO Take 1 tablet by mouth in the morning   • gabapentin (NEURONTIN) 300 mg capsule Take 1 capsule (300 mg total) by mouth daily at bedtime   • gabapentin (Neurontin) 300 mg capsule Take 1 capsule (300 mg total) by mouth 3 (three) times a day   • ibuprofen (MOTRIN) 600 mg tablet Take 600 mg by mouth every 6 (six) hours as needed (Patient not taking: Reported on 7/25/2023)       Objective     /80 (BP Location: Left arm, Patient Position: Sitting, Cuff Size: Large)   Pulse 74   Temp 97.5 °F (36.4 °C) (Tympanic)   Resp 16   Ht 5' 3" (1.6 m)   Wt 88.1 kg (194 lb 3.2 oz)   SpO2 97%   BMI 34.40 kg/m²     Physical Exam  Vitals reviewed. Constitutional:       Appearance: Normal appearance. She is obese. HENT:      Head: Normocephalic and atraumatic. Mouth/Throat:      Mouth: Mucous membranes are moist.   Cardiovascular:      Rate and Rhythm: Normal rate and regular rhythm. Heart sounds: Normal heart sounds. Pulmonary:      Effort: Pulmonary effort is normal.      Breath sounds: Normal breath sounds. No wheezing. Musculoskeletal:      Cervical back: Normal range of motion. Right lower leg: No edema. Left lower leg: No edema. Neurological:      General: No focal deficit present. Mental Status: She is alert and oriented to person, place, and time.    Psychiatric:         Mood and Affect: Mood normal.         Behavior: Behavior normal.       Jah Coello MD

## 2023-09-19 DIAGNOSIS — M17.12 PRIMARY OSTEOARTHRITIS OF LEFT KNEE: ICD-10-CM

## 2023-09-19 RX ORDER — NAPROXEN 500 MG/1
500 TABLET ORAL 2 TIMES DAILY WITH MEALS
Qty: 60 TABLET | Refills: 0 | Status: SHIPPED | OUTPATIENT
Start: 2023-09-19

## 2023-09-27 ENCOUNTER — CLINICAL SUPPORT (OUTPATIENT)
Dept: BARIATRICS | Facility: CLINIC | Age: 53
End: 2023-09-27

## 2023-09-27 VITALS
HEART RATE: 85 BPM | WEIGHT: 193 LBS | HEIGHT: 63 IN | RESPIRATION RATE: 16 BRPM | BODY MASS INDEX: 34.2 KG/M2 | SYSTOLIC BLOOD PRESSURE: 130 MMHG | DIASTOLIC BLOOD PRESSURE: 80 MMHG

## 2023-09-27 DIAGNOSIS — R63.5 ABNORMAL WEIGHT GAIN: Primary | ICD-10-CM

## 2023-09-27 PROCEDURE — RECHECK

## 2023-09-27 RX ORDER — SULFAMETHOXAZOLE AND TRIMETHOPRIM 800; 160 MG/1; MG/1
TABLET ORAL
COMMUNITY
Start: 2023-09-18

## 2023-09-27 RX ORDER — TRIAMCINOLONE ACETONIDE 1 MG/G
1 OINTMENT TOPICAL 2 TIMES DAILY
COMMUNITY
Start: 2023-09-18

## 2023-09-27 NOTE — PROGRESS NOTES
Patient last visit weight:  Patient current visit weight:    If you are taking phentermine or other oral weight loss medications, are you experiencing any of the following symptoms:  Headache:   Blurred Vision:   Chest Pain:   Palpitations: Insomnia:   SPECIFY ORAL MEDICATION AND DOSAGE:     If you are taking an injectable medication,  are you experiencing any of the following symptoms:  Bloating: No  Nausea:NO  Vomiting: No  Constipation: No  Diarrhea:No  SPECIFY INJECTABLE MEDICATION AND CURRENT DOSAGE: WEGOVY  PT IS NOT ON THE SECOND DOSE STILL HASENT FOUND MED'S , I GAVE HER LIST OF PHARMACY TODAY TO CALL    Vitals:    - Is BP less than 100/60? nO  - Is BP greater than 140/90?nO  - Is HR greater than 100?nO  **If yes to any of the above, have patient relax and repeat in 5-10 minutes**    Repeat values:    - Is BP less than 100/60?  - Is BP greater than 140/90?  - Is HR greater than 100?   **If values remain outside of ranges above, please consult provider for next steps**

## 2023-10-04 ENCOUNTER — TELEPHONE (OUTPATIENT)
Dept: BARIATRICS | Facility: CLINIC | Age: 53
End: 2023-10-04

## 2023-10-04 DIAGNOSIS — E66.09 CLASS 2 OBESITY DUE TO EXCESS CALORIES WITHOUT SERIOUS COMORBIDITY WITH BODY MASS INDEX (BMI) OF 35.0 TO 35.9 IN ADULT: Primary | ICD-10-CM

## 2023-10-04 NOTE — TELEPHONE ENCOUNTER
Pt asking to send the [facility]LISA WALLACE Script to [facility] on file 0.25 dose

## 2023-10-04 NOTE — TELEPHONE ENCOUNTER
Patient called stating she found the starting dose of wegovy at [facility]. Pharmacy on file already.

## 2023-10-05 DIAGNOSIS — E66.09 CLASS 2 OBESITY DUE TO EXCESS CALORIES WITHOUT SERIOUS COMORBIDITY WITH BODY MASS INDEX (BMI) OF 35.0 TO 35.9 IN ADULT: ICD-10-CM

## 2023-10-11 ENCOUNTER — OFFICE VISIT (OUTPATIENT)
Dept: FAMILY MEDICINE CLINIC | Facility: CLINIC | Age: 53
End: 2023-10-11
Payer: COMMERCIAL

## 2023-10-11 VITALS
HEIGHT: 63 IN | TEMPERATURE: 97.3 F | RESPIRATION RATE: 16 BRPM | OXYGEN SATURATION: 97 % | BODY MASS INDEX: 34.98 KG/M2 | SYSTOLIC BLOOD PRESSURE: 141 MMHG | DIASTOLIC BLOOD PRESSURE: 65 MMHG | HEART RATE: 94 BPM | WEIGHT: 197.4 LBS

## 2023-10-11 DIAGNOSIS — Z23 ENCOUNTER FOR IMMUNIZATION: ICD-10-CM

## 2023-10-11 DIAGNOSIS — I10 ESSENTIAL HYPERTENSION: Primary | ICD-10-CM

## 2023-10-11 DIAGNOSIS — E66.09 CLASS 2 OBESITY DUE TO EXCESS CALORIES WITHOUT SERIOUS COMORBIDITY WITH BODY MASS INDEX (BMI) OF 35.0 TO 35.9 IN ADULT: ICD-10-CM

## 2023-10-11 PROCEDURE — 99213 OFFICE O/P EST LOW 20 MIN: CPT | Performed by: FAMILY MEDICINE

## 2023-10-11 PROCEDURE — 90686 IIV4 VACC NO PRSV 0.5 ML IM: CPT | Performed by: FAMILY MEDICINE

## 2023-10-11 PROCEDURE — 90471 IMMUNIZATION ADMIN: CPT | Performed by: FAMILY MEDICINE

## 2023-10-11 NOTE — PROGRESS NOTES
Name: Patricia Segovia      : 1970      MRN: [id number]  Encounter Provider: Meera Acuna MD  Encounter Date: 10/11/2023   Encounter department: [address] [place] [address] [place]     1. Essential hypertension    2. Class 2 obesity due to excess calories without serious comorbidity with body mass index (BMI) of 35.0 to 35.9 in adult    3. Encounter for immunization  -     influenza vaccine, quadrivalent, 0.5 mL, preservative-free, for adult and pediatric patients 6 mos+ (AFLURIA, FLUARIX, FLULAVAL, FLUZONE)           Regarding her blood pressure patient was appropriately advised. Patient will start taking losartan today hold off the nifedipine. Patient will monitor her symptoms if any and also keep a BP log. I can see her again in a month for recheck and follow-up on any medication. Patient also advised to lose weight with diet and exercise. Patient received the flu vaccine with this today. RTO 6 weeks for physical.    Subjective      50-year-old female here for a blood pressure recheck. Patient never picked up the losartan from her pharmacy. I called the pharmacy in front of her today and was ready on the same day that we had prescribed it. Patient did not take her medication yet today. Patient denies any neurological or cardiovascular pulmonary signs or symptoms. Patient is amenable to getting a flu vaccine with us today. Asserts that she get a Pap smear less than 12 months and also a colonoscopy recently as well. Review of Systems   Constitutional:  Negative for fatigue. HENT:  Negative for congestion and sore throat. Eyes:  Negative for visual disturbance. Respiratory:  Negative for cough and shortness of breath. Cardiovascular:  Negative for chest pain and palpitations. Genitourinary:  Negative for dysuria. Skin:  Negative for rash. Neurological:  Negative for dizziness and headaches.        Current Outpatient Medications on File Prior to Visit Medication Sig   • Aspirin-Acetaminophen-Caffeine (EXCEDRIN PO) Take by mouth as needed   • Biotin 10 MG CAPS Take 1 capsule by mouth daily   • buPROPion (WELLBUTRIN XL) 150 mg 24 hr tablet Take 150 mg by mouth every morning   • clindamycin (CLINDAGEL) 1 % gel    • clotrimazole-betamethasone (LOTRISONE) 1-0.05 % cream Apply 1 application topically 2 (two) times a day To affected area   • COLLAGEN PO Take by mouth daily   • Cyanocobalamin (VITAMIN B 12 PO) Take by mouth every other day   • gabapentin (Neurontin) 300 mg capsule Take 1 capsule (300 mg total) by mouth 3 (three) times a day   • ibuprofen (MOTRIN) 600 mg tablet Take 600 mg by mouth every 6 (six) hours as needed   • losartan (COZAAR) 25 mg tablet Take 1 tablet (25 mg total) by mouth daily   • Misc Natural Products (APPLE CIDER VINEGAR DIET PO) Take by mouth daily   • naproxen (NAPROSYN) 500 mg tablet TAKE 1 TABLET BY MOUTH TWICE A DAY WITH FOOD   • NIFEdipine ER (ADALAT CC) 30 MG 24 hr tablet Take 1 tablet (30 mg total) by mouth daily   • Omega-3 1000 MG CAPS Take 1 capsule by mouth daily   • Semaglutide-Weight Management (WEGOVY) 0.25 MG/0.5ML Inject 0.5 mL (0.25 mg total) under the skin once a week   • triamcinolone (KENALOG) 0.1 % ointment Apply 1 Application topically 2 (two) times a day To affected area   • VITAMIN A PO Take by mouth every other day   • VITAMIN D PO Take 1 tablet by mouth in the morning   • gabapentin (NEURONTIN) 300 mg capsule Take 1 capsule (300 mg total) by mouth daily at bedtime   • gabapentin (Neurontin) 300 mg capsule Take 1 capsule (300 mg total) by mouth 3 (three) times a day   • sulfamethoxazole-trimethoprim (BACTRIM DS) 800-160 mg per tablet TAKE 1 TABLET BY MOUTH TWICE A DAY FOR 7 DAYS (Patient not taking: Reported on 10/11/2023)   • valACYclovir (VALTREX) 500 mg tablet Take 500 mg by mouth daily (Patient not taking: Reported on 10/11/2023)       Objective     /65 (BP Location: Left arm, Patient Position: Sitting, Cuff Size: Standard)   Pulse 94   Temp (!) 97.3 °F (36.3 °C) (Temporal)   Resp 16   Ht 5' 3" (1.6 m)   Wt 89.5 kg (197 lb 6.4 oz)   SpO2 97%   BMI 34.97 kg/m²     Physical Exam  Constitutional:       General: She is not in acute distress. Appearance: Normal appearance. She is obese. HENT:      Head: Normocephalic and atraumatic. Eyes:      Extraocular Movements: Extraocular movements intact. Conjunctiva/sclera: Conjunctivae normal.   Neck:      Vascular: No carotid bruit. Cardiovascular:      Rate and Rhythm: Normal rate and regular rhythm. Pulmonary:      Effort: Pulmonary effort is normal.      Breath sounds: Normal breath sounds. Musculoskeletal:      Right lower leg: No edema. Left lower leg: No edema. Comments: No calf tenderness bilaterally   Neurological:      General: No focal deficit present. Mental Status: She is alert and oriented to person, place, and time. Cranial Nerves: No cranial nerve deficit.    Psychiatric:         Mood and Affect: Mood normal.         Behavior: Behavior normal.       Renetta Dubois MD

## 2023-10-16 DIAGNOSIS — M17.12 PRIMARY OSTEOARTHRITIS OF LEFT KNEE: ICD-10-CM

## 2023-10-16 RX ORDER — NAPROXEN 500 MG/1
500 TABLET ORAL 2 TIMES DAILY WITH MEALS
Qty: 60 TABLET | Refills: 0 | Status: SHIPPED | OUTPATIENT
Start: 2023-10-16

## 2023-11-07 DIAGNOSIS — I10 ESSENTIAL HYPERTENSION: ICD-10-CM

## 2023-11-07 DIAGNOSIS — E66.09 CLASS 2 OBESITY DUE TO EXCESS CALORIES WITHOUT SERIOUS COMORBIDITY WITH BODY MASS INDEX (BMI) OF 35.0 TO 35.9 IN ADULT: ICD-10-CM

## 2023-11-07 RX ORDER — SEMAGLUTIDE 0.25 MG/.5ML
INJECTION, SOLUTION SUBCUTANEOUS
OUTPATIENT
Start: 2023-11-07

## 2023-11-07 NOTE — TELEPHONE ENCOUNTER
----- Message from [address] [place], [place] [address] sent at 11/7/2023  9:51 AM EST -----  Regarding: wegovy  Can you let her know [facility] called [facility] and they dont have the 0.25mg of wegovy.   Once it becomes more available we will let her know

## 2023-11-24 DIAGNOSIS — M17.12 PRIMARY OSTEOARTHRITIS OF LEFT KNEE: ICD-10-CM

## 2023-11-24 RX ORDER — NAPROXEN 500 MG/1
500 TABLET ORAL 2 TIMES DAILY WITH MEALS
Qty: 60 TABLET | Refills: 0 | Status: SHIPPED | OUTPATIENT
Start: 2023-11-24

## 2023-11-30 ENCOUNTER — OFFICE VISIT (OUTPATIENT)
Dept: PODIATRY | Facility: CLINIC | Age: 53
End: 2023-11-30
Payer: COMMERCIAL

## 2023-11-30 VITALS
HEIGHT: 63 IN | DIASTOLIC BLOOD PRESSURE: 80 MMHG | BODY MASS INDEX: 35.51 KG/M2 | WEIGHT: 200.4 LBS | SYSTOLIC BLOOD PRESSURE: 137 MMHG | HEART RATE: 86 BPM

## 2023-11-30 DIAGNOSIS — S99.921D INJURY OF PLANTAR PLATE OF RIGHT FOOT, SUBSEQUENT ENCOUNTER: ICD-10-CM

## 2023-11-30 DIAGNOSIS — M20.41 HAMMER TOE OF RIGHT FOOT: Primary | ICD-10-CM

## 2023-11-30 PROCEDURE — 99212 OFFICE O/P EST SF 10 MIN: CPT | Performed by: PODIATRIST

## 2023-11-30 NOTE — PROGRESS NOTES
Patient presents for assessment of right foot. Patient continues to have discomfort in the right foot. She notes that the right second toe is elevated above the other toes and it is causing pressure on the third toe. On exam, the right second toe does not purchase. It is painful primarily on the dorsum of the toe with shoe pressure. Explained that additional surgery is needed for correction and this will be difficult for the patient at this time. Dispensed digital brace to hold toe in better alignment and showed patient how to utilize this brace. Reappoint 3 weeks.

## 2023-12-06 DIAGNOSIS — I10 ESSENTIAL HYPERTENSION: ICD-10-CM

## 2023-12-06 RX ORDER — LOSARTAN POTASSIUM 25 MG/1
25 TABLET ORAL DAILY
Qty: 90 TABLET | Refills: 2 | Status: SHIPPED | OUTPATIENT
Start: 2023-12-06

## 2023-12-13 ENCOUNTER — OFFICE VISIT (OUTPATIENT)
Dept: BARIATRICS | Facility: CLINIC | Age: 53
End: 2023-12-13
Payer: COMMERCIAL

## 2023-12-13 VITALS
WEIGHT: 196 LBS | RESPIRATION RATE: 16 BRPM | BODY MASS INDEX: 37 KG/M2 | HEART RATE: 81 BPM | DIASTOLIC BLOOD PRESSURE: 70 MMHG | HEIGHT: 61 IN | SYSTOLIC BLOOD PRESSURE: 120 MMHG

## 2023-12-13 DIAGNOSIS — I10 ESSENTIAL HYPERTENSION: ICD-10-CM

## 2023-12-13 DIAGNOSIS — E66.9 OBESITY, CLASS II, BMI 35-39.9: Primary | ICD-10-CM

## 2023-12-13 PROBLEM — E66.812 OBESITY, CLASS II, BMI 35-39.9: Status: ACTIVE | Noted: 2023-12-13

## 2023-12-13 PROCEDURE — 99214 OFFICE O/P EST MOD 30 MIN: CPT | Performed by: NURSE PRACTITIONER

## 2023-12-13 RX ORDER — TIRZEPATIDE 2.5 MG/.5ML
2.5 INJECTION, SOLUTION SUBCUTANEOUS WEEKLY
Qty: 2 ML | Refills: 0 | Status: SHIPPED | OUTPATIENT
Start: 2023-12-13 | End: 2024-01-10

## 2023-12-13 NOTE — ASSESSMENT & PLAN NOTE
- Patient is pursuing Conservative Program  - Initial weight loss goal of 5-10% weight loss for improved health  - Previously on phentermine and Topamax through Dr. Godwin Rinne.  Arash Barnett started July 2023 and took first month, but could not find second month. - Patient denies personal history of pancreatitis. Patient also denies personal and family history of thyroid cancer and multiple endocrine neoplasia type 2 (MEN 2 tumor). - Denies history of kidney stones, seizures, or glaucoma. - Discussed Zepbound for weight loss to help with appetite and cravings and she would like to go ahead and start that. - Start Zepbound 2.5 mg weekly. After you have taken the second pen, please give me an update, as we will likely increase the dose the next month if you are tolerating it well. - Side effects of Zepbound include nausea, vomiting, diarrhea, or constipation. Keep an eye on your heart rate while on Zepbound. If you resting heart rate is greater than 100 beats per minutes, please notify me. If you develop severe abdominal pain, stop Zepbound and go to the emergency room, as that could be a sign of pancreatitis. - Please notify me if you have surgery, upper endoscopy, or colonoscopy scheduled, as we typically hold Zepbound for one week prior to the procedure. - Postmenopausal.   - Labs reviewed: Lipid, A1C, CMP, and TSH 8/31/2023. HDL low and LDL elevated, which will likely improve with weight loss. Remainder of the blood work within acceptable range. Initial: 198.1 lbs   Current: 196 lbs BMI 37.03  Change: +2 lbs  Goal: 150 lbs    Goals:  Do not skip meals. Food log (ie.) www.[employer].com,[employer],calorieking. com,etc. baritastic (use [employer], dietdoctor. com or smartphone leonor [employer] for recipes)  No sugary beverages. At least 64oz of water daily. Increase physical activity by 10 minutes daily.  Gradually increase physical activity to a goal of 5 days per week for 30 minutes of MODERATE intensity PLUS 2 days per week of FULL BODY resistance training (use smartphone apps [employer], Home Workout, etc.)  Start food logging, weighing and measuring food. 7642-9245 calories per day. Increase water intake to at least 64 oz daily. Start exercise and gradually increase to goal of 5 days per week for 30 minutes and 2 days per week resistance training. Start Dellia Hilary.

## 2023-12-13 NOTE — PATIENT INSTRUCTIONS
- Start Zepbound 2.5 mg weekly. After you have taken the second pen, please give me an update, as we will likely increase the dose the next month if you are tolerating it well. - Side effects of Zepbound include nausea, vomiting, diarrhea, or constipation. Keep an eye on your heart rate while on Zepbound. If you resting heart rate is greater than 100 beats per minutes, please notify me. If you develop severe abdominal pain, stop Zepbound and go to the emergency room, as that could be a sign of pancreatitis. - Please notify me if you have surgery, upper endoscopy, or colonoscopy scheduled, as we typically hold Zepbound for one week prior to the procedure.

## 2023-12-13 NOTE — ASSESSMENT & PLAN NOTE
- Taking losartan and nifedipine. May improve with weight loss and lifestyle modification. Continue management with prescribing provider.

## 2023-12-13 NOTE — PROGRESS NOTES
Assessment/Plan:     Obesity, Class II, BMI 35-39.9  - Patient is pursuing Conservative Program  - Initial weight loss goal of 5-10% weight loss for improved health  - Previously on phentermine and Topamax through Dr. Gus Lombardi.  Alvin Garcia started July 2023 and took first month, but could not find second month. - Patient denies personal history of pancreatitis. Patient also denies personal and family history of thyroid cancer and multiple endocrine neoplasia type 2 (MEN 2 tumor). - Denies history of kidney stones, seizures, or glaucoma. - Discussed Zepbound for weight loss to help with appetite and cravings and she would like to go ahead and start that. - Start Zepbound 2.5 mg weekly. After you have taken the second pen, please give me an update, as we will likely increase the dose the next month if you are tolerating it well. - Side effects of Zepbound include nausea, vomiting, diarrhea, or constipation. Keep an eye on your heart rate while on Zepbound. If you resting heart rate is greater than 100 beats per minutes, please notify me. If you develop severe abdominal pain, stop Zepbound and go to the emergency room, as that could be a sign of pancreatitis. - Please notify me if you have surgery, upper endoscopy, or colonoscopy scheduled, as we typically hold Zepbound for one week prior to the procedure. - Postmenopausal.   - Labs reviewed: Lipid, A1C, CMP, and TSH 8/31/2023. HDL low and LDL elevated, which will likely improve with weight loss. Remainder of the blood work within acceptable range. Initial: 198.1 lbs   Current: 196 lbs BMI 37.03  Change: +2 lbs  Goal: 150 lbs    Goals:  Do not skip meals. Food log (ie.) www.[employer]ola[employer].com,Image Searcher,[employer].com,calorieking. com,etc. baritastic (use PA Semi, dietdoctor. com or smartphone leonor Galleon for recipes)  No sugary beverages. At least 64oz of water daily. Increase physical activity by 10 minutes daily.  Gradually increase physical activity to a goal of 5 days per week for 30 minutes of MODERATE intensity PLUS 2 days per week of FULL BODY resistance training (use smartphone apps FitON, Home Workout, etc.)  Start food logging, weighing and measuring food. 0427-6359 calories per day. Increase water intake to at least 64 oz daily. Start exercise and gradually increase to goal of 5 days per week for 30 minutes and 2 days per week resistance training. Start Francies Corns. Essential hypertension  - Taking losartan and nifedipine. May improve with weight loss and lifestyle modification. Continue management with prescribing provider. Joselyn Santillan was seen today for follow-up. Diagnoses and all orders for this visit:    Obesity, Class II, BMI 35-39.9  -     tirzepatide (Zepbound) 2.5 mg/0.5 mL auto-injector; Inject 0.5 mL (2.5 mg total) under the skin once a week for 28 days    Essential hypertension  -     tirzepatide (Zepbound) 2.5 mg/0.5 mL auto-injector; Inject 0.5 mL (2.5 mg total) under the skin once a week for 28 days            Follow up in approximately  2 month nurse visit and 4 months  with Non-Surgical Physician/Advanced Practitioner. Subjective:   Chief Complaint   Patient presents with    Follow-up     M- 4mt f/u- Waist 41.5in       Patient ID: Erinn Duran  is a 46 y.o. female with excess weight/obesity here to pursue weight management. Patient is pursuing Conservative Program. Presents to the office visit with her . [employer]  #[id number] translated the office visit. Most recent notes and records were reviewed.     HPI    Wt Readings from Last 10 Encounters:   12/13/23 88.9 kg (196 lb)   11/30/23 90.9 kg (200 lb 6.4 oz)   10/11/23 89.5 kg (197 lb 6.4 oz)   09/27/23 87.5 kg (193 lb)   09/13/23 89.8 kg (198 lb)   09/13/23 88.1 kg (194 lb 3.2 oz)   08/31/23 89.8 kg (198 lb)   07/27/23 89.8 kg (198 lb)   07/25/23 89.9 kg (198 lb 3.2 oz)   06/29/23 88.5 kg (195 lb)     Saw my colleague Taylor Palmer PA-C in [facility] consultation in July 2023. Presents today for follow-up. On phentermine and Topamax in the past with Dr. Tanja Cordoba. Was on Wellbutrin at one point, but reports she is no longer taking it. [facility] and took the first month of 0.25 mg, but could not find the second month of 0.5 mg. Not food logging. Sometimes appetite increased and having cravings. B- Special K cereal with milk   S- jello or fruit and PB or trail mix   L- chicken and sometimes salad and rice or potato salad  S- none  D- cereal with milk   S- none     Hydration- 2.5 bottles of crystal light, juice   Alcohol- none  Tobacco- denies  Exercise- none  Sleep- 6 hours    Colonoscopy: UTD, due 2030  Mammogram: UTD, due Sept 2024      The following portions of the patient's history were reviewed and updated as appropriate: allergies, current medications, past family history, past medical history, past social history, past surgical history, and problem list.    Family History   Problem Relation Age of Onset    Hypertension Mother     Prostate cancer Father         unknown age    Cancer Father     No Known Problems Sister     No Known Problems Daughter     No Known Problems Maternal Grandmother     No Known Problems Maternal Grandfather     No Known Problems Paternal Grandmother     No Known Problems Paternal Grandfather     No Known Problems Maternal Aunt     No Known Problems Paternal Aunt         Review of Systems   HENT:  Negative for sore throat. Respiratory:  Negative for cough and shortness of breath. Cardiovascular:  Negative for chest pain and palpitations. Gastrointestinal:  Positive for constipation. Negative for abdominal pain, diarrhea, nausea and vomiting. Denies GERD   Musculoskeletal:  Positive for arthralgias. Negative for back pain. Skin:  Negative for rash. Psychiatric/Behavioral:  Negative for suicidal ideas (or HI).          Denies depression and anxiety       Objective:  /70 (BP Location: Left arm, Patient Position: Sitting)   Pulse 81   Resp 16   Ht 5' 1" (1.549 m)   Wt 88.9 kg (196 lb)   BMI 37.03 kg/m²     Physical Exam  Vitals and nursing note reviewed. Constitutional   General appearance: Abnormal.  well developed and obese. Eyes No conjunctival injection. Ears, Nose, Mouth, and Throat Oral mucosa moist.   Pulmonary   Respiratory effort: No increased work of breathing or signs of respiratory distress. Cardiovascular     Examination of extremities for edema and/or varicosities: Normal.  no edema. Abdomen   Abdomen: Abnormal.  The abdomen was obese.     Musculoskeletal   Normal range of motion  Neurological   Gait and station: Normal.    Psychiatric   Orientation to person, place and time: Normal.    Affect: appropriate

## 2023-12-21 ENCOUNTER — PREP FOR PROCEDURE (OUTPATIENT)
Dept: PODIATRY | Facility: CLINIC | Age: 53
End: 2023-12-21

## 2023-12-21 ENCOUNTER — OFFICE VISIT (OUTPATIENT)
Dept: PODIATRY | Facility: CLINIC | Age: 53
End: 2023-12-21
Payer: COMMERCIAL

## 2023-12-21 VITALS
RESPIRATION RATE: 18 BRPM | HEIGHT: 61 IN | HEART RATE: 88 BPM | SYSTOLIC BLOOD PRESSURE: 129 MMHG | DIASTOLIC BLOOD PRESSURE: 83 MMHG | BODY MASS INDEX: 37.03 KG/M2

## 2023-12-21 DIAGNOSIS — M20.41 ACQUIRED HAMMER TOE OF RIGHT FOOT: Primary | ICD-10-CM

## 2023-12-21 DIAGNOSIS — M20.61 ACQUIRED DEFORMITY OF RIGHT TOE: Primary | ICD-10-CM

## 2023-12-21 DIAGNOSIS — S99.921D INJURY OF PLANTAR PLATE OF RIGHT FOOT, SUBSEQUENT ENCOUNTER: Primary | ICD-10-CM

## 2023-12-21 DIAGNOSIS — M20.61 ACQUIRED DEFORMITY OF RIGHT TOE: ICD-10-CM

## 2023-12-21 DIAGNOSIS — Z01.818 PRE-OP EVALUATION: Primary | ICD-10-CM

## 2023-12-21 PROCEDURE — 99213 OFFICE O/P EST LOW 20 MIN: CPT | Performed by: PODIATRIST

## 2023-12-21 NOTE — PROGRESS NOTES
Patient presents for assessment of right second toe.  Patient having pain in the toe as it is of elevated above all lesser digits.  This reflects a rupture of the plantar plate.  Patient did not get any improvement with the brace supplied at last visit.  She was told to discontinue wearing it.    Explained that to correct this disorder, direct repair of the plantar plate is advised.  Explained that this ligament is fragile and could potentially rupture even after repair.  For this reason, also recommended syndactyly of the second toe to the third to help maintain alignment.  Patient is desirous of the surgery in the future.  The procedure was explained in detail including pre and postoperative course, risk and complications.  Consent form was signed.    Note:  Marie acted as .

## 2023-12-24 DIAGNOSIS — M17.12 PRIMARY OSTEOARTHRITIS OF LEFT KNEE: ICD-10-CM

## 2023-12-26 RX ORDER — NAPROXEN 500 MG/1
500 TABLET ORAL 2 TIMES DAILY WITH MEALS
Qty: 60 TABLET | Refills: 5 | Status: SHIPPED | OUTPATIENT
Start: 2023-12-26

## 2023-12-28 ENCOUNTER — TELEPHONE (OUTPATIENT)
Dept: OBGYN CLINIC | Facility: CLINIC | Age: 53
End: 2023-12-28

## 2023-12-28 NOTE — TELEPHONE ENCOUNTER
Left a message to seth bae.   
PRINCIPAL DISCHARGE DIAGNOSIS  Diagnosis: AVNRT (AV nicole re-entry tachycardia)  Assessment and Plan of Treatment:

## 2024-01-03 ENCOUNTER — TELEPHONE (OUTPATIENT)
Age: 54
End: 2024-01-03

## 2024-01-03 NOTE — TELEPHONE ENCOUNTER
Caller: Umang Erazo    Doctor: Angelica Claros    Reason for call: Needs to ask some questions :  diagnosis, treatment plan, restrictions and limitations and date she was advised to stop working.    Can someone reach out to her with this info?    Thank you.    Call back#: [phone]

## 2024-01-05 NOTE — TELEPHONE ENCOUNTER
Tried to contact [employer]  Was transferred and call was disconnected, if they call back please let me know.

## 2024-01-10 ENCOUNTER — APPOINTMENT (OUTPATIENT)
Dept: LAB | Facility: MEDICAL CENTER | Age: 54
End: 2024-01-10
Payer: COMMERCIAL

## 2024-01-10 DIAGNOSIS — Z01.818 PRE-OP EVALUATION: ICD-10-CM

## 2024-01-10 LAB
ANION GAP SERPL CALCULATED.3IONS-SCNC: 11 MMOL/L
BASOPHILS # BLD AUTO: 0.06 THOUSANDS/ÂΜL (ref 0–0.1)
BASOPHILS NFR BLD AUTO: 1 % (ref 0–1)
BUN SERPL-MCNC: 19 MG/DL (ref 5–25)
CALCIUM SERPL-MCNC: 9.7 MG/DL (ref 8.4–10.2)
CHLORIDE SERPL-SCNC: 106 MMOL/L (ref 96–108)
CO2 SERPL-SCNC: 25 MMOL/L (ref 21–32)
CREAT SERPL-MCNC: 0.71 MG/DL (ref 0.6–1.3)
EOSINOPHIL # BLD AUTO: 0.16 THOUSAND/ÂΜL (ref 0–0.61)
EOSINOPHIL NFR BLD AUTO: 2 % (ref 0–6)
ERYTHROCYTE [DISTWIDTH] IN BLOOD BY AUTOMATED COUNT: 14.1 % (ref 11.6–15.1)
GFR SERPL CREATININE-BSD FRML MDRD: 96 ML/MIN/1.73SQ M
GLUCOSE P FAST SERPL-MCNC: 104 MG/DL (ref 65–99)
HCT VFR BLD AUTO: 45.2 % (ref 34.8–46.1)
HGB BLD-MCNC: 14.4 G/DL (ref 11.5–15.4)
IMM GRANULOCYTES # BLD AUTO: 0.02 THOUSAND/UL (ref 0–0.2)
IMM GRANULOCYTES NFR BLD AUTO: 0 % (ref 0–2)
LYMPHOCYTES # BLD AUTO: 2.31 THOUSANDS/ÂΜL (ref 0.6–4.47)
LYMPHOCYTES NFR BLD AUTO: 26 % (ref 14–44)
MCH RBC QN AUTO: 28.8 PG (ref 26.8–34.3)
MCHC RBC AUTO-ENTMCNC: 31.9 G/DL (ref 31.4–37.4)
MCV RBC AUTO: 90 FL (ref 82–98)
MONOCYTES # BLD AUTO: 0.5 THOUSAND/ÂΜL (ref 0.17–1.22)
MONOCYTES NFR BLD AUTO: 6 % (ref 4–12)
NEUTROPHILS # BLD AUTO: 5.79 THOUSANDS/ÂΜL (ref 1.85–7.62)
NEUTS SEG NFR BLD AUTO: 65 % (ref 43–75)
NRBC BLD AUTO-RTO: 0 /100 WBCS
PLATELET # BLD AUTO: 282 THOUSANDS/UL (ref 149–390)
PMV BLD AUTO: 10.2 FL (ref 8.9–12.7)
POTASSIUM SERPL-SCNC: 4.9 MMOL/L (ref 3.5–5.3)
RBC # BLD AUTO: 5 MILLION/UL (ref 3.81–5.12)
SODIUM SERPL-SCNC: 142 MMOL/L (ref 135–147)
WBC # BLD AUTO: 8.84 THOUSAND/UL (ref 4.31–10.16)

## 2024-01-10 PROCEDURE — 85025 COMPLETE CBC W/AUTO DIFF WBC: CPT

## 2024-01-10 PROCEDURE — 36415 COLL VENOUS BLD VENIPUNCTURE: CPT

## 2024-01-10 PROCEDURE — 80048 BASIC METABOLIC PNL TOTAL CA: CPT

## 2024-01-11 ENCOUNTER — TELEPHONE (OUTPATIENT)
Dept: BARIATRICS | Facility: CLINIC | Age: 54
End: 2024-01-11

## 2024-01-11 NOTE — TELEPHONE ENCOUNTER
Patient was informed. Patient stated that she will call around to see if she can find the wegovy or saxenda.

## 2024-01-11 NOTE — TELEPHONE ENCOUNTER
Please let the patient know we heard back from her insurance and unfortunately they denied Zepbound, as it is not a preferred drug. The preferred drugs are Orlistat, Qsymia (phentermine and Topamax together), Saxenda, and Wegovy. Saxenda is a daily injection you give yourself and Wegovy is a weekly injection you give yourself. Both are on supply chain shortage and are difficult to find. She should let me know if she is interested in trying one of the other medications and which one she is interested in.

## 2024-02-07 ENCOUNTER — TELEPHONE (OUTPATIENT)
Age: 54
End: 2024-02-07

## 2024-02-07 ENCOUNTER — OFFICE VISIT (OUTPATIENT)
Dept: FAMILY MEDICINE CLINIC | Facility: CLINIC | Age: 54
End: 2024-02-07
Payer: COMMERCIAL

## 2024-02-07 VITALS
WEIGHT: 194 LBS | DIASTOLIC BLOOD PRESSURE: 82 MMHG | OXYGEN SATURATION: 96 % | TEMPERATURE: 97.4 F | RESPIRATION RATE: 16 BRPM | HEIGHT: 61 IN | SYSTOLIC BLOOD PRESSURE: 140 MMHG | BODY MASS INDEX: 36.63 KG/M2 | HEART RATE: 80 BPM

## 2024-02-07 DIAGNOSIS — I10 ESSENTIAL HYPERTENSION: ICD-10-CM

## 2024-02-07 DIAGNOSIS — E66.9 OBESITY, CLASS II, BMI 35-39.9: ICD-10-CM

## 2024-02-07 DIAGNOSIS — Z01.818 PRE-OP EXAM: Primary | ICD-10-CM

## 2024-02-07 DIAGNOSIS — M21.961 ACQUIRED DEFORMITY OF RIGHT FOOT: ICD-10-CM

## 2024-02-07 PROBLEM — E66.812 CLASS 2 OBESITY DUE TO EXCESS CALORIES WITHOUT SERIOUS COMORBIDITY WITH BODY MASS INDEX (BMI) OF 35.0 TO 35.9 IN ADULT: Status: RESOLVED | Noted: 2023-06-09 | Resolved: 2024-02-07

## 2024-02-07 PROBLEM — E66.09 CLASS 2 OBESITY DUE TO EXCESS CALORIES WITHOUT SERIOUS COMORBIDITY WITH BODY MASS INDEX (BMI) OF 35.0 TO 35.9 IN ADULT: Status: RESOLVED | Noted: 2023-06-09 | Resolved: 2024-02-07

## 2024-02-07 PROCEDURE — 93000 ELECTROCARDIOGRAM COMPLETE: CPT | Performed by: FAMILY MEDICINE

## 2024-02-07 PROCEDURE — 99214 OFFICE O/P EST MOD 30 MIN: CPT | Performed by: FAMILY MEDICINE

## 2024-02-07 NOTE — PRE-PROCEDURE INSTRUCTIONS
Pre-Surgery Instructions:   Medication Instructions    Aspirin-Acetaminophen-Caffeine (EXCEDRIN PO) Stop taking 7 days prior to surgery.    Biotin 10 MG CAPS Stop taking 7 days prior to surgery.    COLLAGEN PO Stop taking 7 days prior to surgery.    Cyanocobalamin (VITAMIN B 12 PO) Stop taking 7 days prior to surgery.    gabapentin (NEURONTIN) 300 mg capsule Take night before surgery    gabapentin (Neurontin) 300 mg capsule Uses PRN- OK to take day of surgery    losartan (COZAAR) 25 mg tablet Take night before surgery    Misc Natural Products (APPLE CIDER VINEGAR DIET PO) Stop taking 7 days prior to surgery.    naproxen (NAPROSYN) 500 mg tablet Stop taking 7 days prior to surgery.    Omega-3 1000 MG CAPS Stop taking 7 days prior to surgery.    triamcinolone (KENALOG) 0.1 % ointment Hold day of surgery.    VITAMIN A PO Take day of surgery.    VITAMIN D PO Stop taking 7 days prior to surgery.   All instructions reviewed with pt using a     Medication instructions for day surgery reviewed. Please use only a sip of water to take your instructed medications. Avoid all over the counter vitamins, supplements and NSAIDS for one week prior to surgery per anesthesia guidelines. Tylenol is ok to take as needed.     You will receive a call one business day prior to surgery with an arrival time and hospital directions. If your surgery is scheduled on a Monday, the hospital will be calling you on the Friday prior to your surgery. If you have not heard from anyone by 8pm, please call the hospital supervisor through the hospital  at [phone]. ([place] [phone] or [place] [phone]).    Do not eat or drink anything after midnight the night before your surgery, including candy, mints, lifesavers, or chewing gum. Do not drink alcohol 24hrs before your surgery. Try not to smoke at least 24hrs before your surgery.       Follow the pre surgery showering instructions as listed in the “My Surgical  Experience Booklet” or otherwise provided by your surgeon's office. Do not use a blade to shave the surgical area 1 week before surgery. It is okay to use a clean electric clippers up to 24 hours before surgery. Do not apply any lotions, creams, including makeup, cologne, deodorant, or perfumes after showering on the day of your surgery. Do not use dry shampoo, hair spray, hair gel, or any type of hair products.     No contact lenses, eye make-up, or artificial eyelashes. Remove nail polish, including gel polish, and any artificial, gel, or acrylic nails if possible. Remove all jewelry including rings and body piercing jewelry.     Wear causal clothing that is easy to take on and off. Consider your type of surgery.    Keep any valuables, jewelry, piercings at home. Please bring any specially ordered equipment (sling, braces) if indicated.    Arrange for a responsible person to drive you to and from the hospital on the day of your surgery. Visitor Guidelines discussed.     Call the surgeon's office with any new illnesses, exposures, or additional questions prior to surgery.    Please reference your “My Surgical Experience Booklet” for additional information to prepare for your upcoming surgery.

## 2024-02-07 NOTE — TELEPHONE ENCOUNTER
Caller: Sharyn Oakes    Doctor/Office: Dr. Dominguez/[facility]    #: [phone]    Escalation: Surgery Patient calling in to confirm her surgery date. She thought it was for 2-9. Her disability paperwork and last day of work are scheduled around that date. Please return call ASAP as I did advise patient it is currently scheduled for  2-16 as well as the hospital also confirmed this for her.  Thank you

## 2024-02-07 NOTE — PROGRESS NOTES
Name: Sharyn Oakes      : 1970      MRN: [id number]  Encounter Provider: Stuart Benson MD  Encounter Date: 2024   Encounter department: [facility]    Assessment & Plan     1. Pre-op exam  -     POCT ECG    2. Acquired deformity of right foot  -     POCT ECG    3. Essential hypertension  -     POCT ECG    4. Obesity, Class II, BMI 35-39.9      53-year-old female here for right foot surgery clearance.  Preop testing including the EKG today reviewed and are acceptable.  Discussed with patient.    Patient cleared for surgery.  Patient advised to be n.p.o. postmidnight before surgery.  Patient advised not to have aspirin anti-inflammatories or vitamins 1 week before.  Patient advised to take her blood pressure medication.    Also advised DVT prophylaxis.    RTO for her next scheduled appointment.         Subjective      53-year-old female here for a preop clearance for her right foot surgery on  of this year with Dr. Dominguez.  Patient did blood work in January.  Patient will get an EKG today with us as per the surgeon's request.  Patient denies any acute symptoms.      Review of Systems   Constitutional:  Negative for activity change, appetite change, chills, fatigue, fever and unexpected weight change.   HENT:  Negative for congestion and sore throat.    Eyes:  Negative for visual disturbance.   Respiratory:  Negative for cough and shortness of breath.    Cardiovascular:  Negative for chest pain and palpitations.   Gastrointestinal:  Negative for abdominal pain, blood in stool, constipation, diarrhea, nausea and vomiting.   Genitourinary:  Negative for dysuria and hematuria.   Musculoskeletal:  Positive for arthralgias.   Skin:  Negative for rash.   Neurological:  Negative for dizziness and headaches.   Psychiatric/Behavioral:  Negative for dysphoric mood. The patient is not nervous/anxious.        Current Outpatient Medications on File Prior to Visit   Medication  Sig   • Aspirin-Acetaminophen-Caffeine (EXCEDRIN PO) Take 1 tablet by mouth as needed   • Biotin 10 MG CAPS Take 1 capsule by mouth daily   • buPROPion (WELLBUTRIN XL) 150 mg 24 hr tablet Take 150 mg by mouth every morning   • COLLAGEN PO Take 1 Scoop by mouth daily   • Cyanocobalamin (VITAMIN B 12 PO) Take 1 tablet by mouth every other day   • gabapentin (NEURONTIN) 300 mg capsule Take 1 capsule (300 mg total) by mouth daily at bedtime   • losartan (COZAAR) 25 mg tablet TAKE 1 TABLET (25 MG TOTAL) BY MOUTH DAILY.   • Misc Natural Products (APPLE CIDER VINEGAR DIET PO) Take 1 capsule by mouth daily   • naproxen (NAPROSYN) 500 mg tablet TAKE 1 TABLET BY MOUTH TWICE A DAY WITH FOOD   • Omega-3 1000 MG CAPS Take 1 capsule by mouth daily   • sulfamethoxazole-trimethoprim (BACTRIM DS) 800-160 mg per tablet    • triamcinolone (KENALOG) 0.1 % ointment Apply 1 Application topically 2 (two) times a day To affected area   • VITAMIN A PO Take 1 capsule by mouth every other day   • VITAMIN D PO Take 1 tablet by mouth in the morning   • ibuprofen (MOTRIN) 600 mg tablet Take 600 mg by mouth every 6 (six) hours as needed (Patient not taking: Reported on 2/7/2024)   • valACYclovir (VALTREX) 500 mg tablet Take 500 mg by mouth daily (Patient not taking: Reported on 10/11/2023)   • [DISCONTINUED] clindamycin (CLINDAGEL) 1 % gel  (Patient not taking: Reported on 12/13/2023)   • [DISCONTINUED] clotrimazole-betamethasone (LOTRISONE) 1-0.05 % cream Apply 1 application topically 2 (two) times a day To affected area (Patient not taking: Reported on 12/13/2023)   • [DISCONTINUED] gabapentin (Neurontin) 300 mg capsule Take 1 capsule (300 mg total) by mouth 3 (three) times a day (Patient not taking: Reported on 2/7/2024)   • [DISCONTINUED] gabapentin (Neurontin) 300 mg capsule Take 1 capsule (300 mg total) by mouth 3 (three) times a day   • [DISCONTINUED] NIFEdipine ER (ADALAT CC) 30 MG 24 hr tablet Take 1 tablet (30 mg total) by mouth daily  "(Patient not taking: Reported on 2/7/2024)       Objective     /82   Pulse 80   Temp (!) 97.4 °F (36.3 °C) (Temporal)   Resp 16   Ht 5' 1\" (1.549 m)   Wt 88 kg (194 lb)   SpO2 96%   BMI 36.66 kg/m²     Physical Exam  Vitals reviewed.   Constitutional:       General: She is not in acute distress.     Appearance: Normal appearance. She is obese. She is not ill-appearing.   HENT:      Head: Normocephalic and atraumatic.      Mouth/Throat:      Pharynx: Oropharynx is clear.   Eyes:      Extraocular Movements: Extraocular movements intact.      Conjunctiva/sclera: Conjunctivae normal.   Neck:      Vascular: No carotid bruit.   Cardiovascular:      Rate and Rhythm: Normal rate and regular rhythm.      Comments: EKG today with normal sinus rhythm at 88 bpm.  No acute or new changes.  Pulmonary:      Effort: Pulmonary effort is normal.      Breath sounds: Normal breath sounds.   Abdominal:      General: Bowel sounds are normal.      Palpations: Abdomen is soft.      Tenderness: There is no right CVA tenderness, left CVA tenderness or guarding.   Musculoskeletal:      Cervical back: Neck supple.      Right lower leg: No edema.      Left lower leg: No edema.      Comments: No calf tenderness bilateral.   Lymphadenopathy:      Cervical: No cervical adenopathy.   Skin:     General: Skin is warm.      Findings: No rash.   Neurological:      General: No focal deficit present.      Mental Status: She is alert and oriented to person, place, and time.   Psychiatric:         Mood and Affect: Mood normal.         Behavior: Behavior normal.       Stuart Benson MD    "

## 2024-02-07 NOTE — H&P (VIEW-ONLY)
Name: Sharyn Oakes      : 1970      MRN: [id number]  Encounter Provider: Stuart Benson MD  Encounter Date: 2024   Encounter department: [facility]    Assessment & Plan     1. Pre-op exam  -     POCT ECG    2. Acquired deformity of right foot  -     POCT ECG    3. Essential hypertension  -     POCT ECG    4. Obesity, Class II, BMI 35-39.9      53-year-old female here for right foot surgery clearance.  Preop testing including the EKG today reviewed and are acceptable.  Discussed with patient.    Patient cleared for surgery.  Patient advised to be n.p.o. postmidnight before surgery.  Patient advised not to have aspirin anti-inflammatories or vitamins 1 week before.  Patient advised to take her blood pressure medication.    Also advised DVT prophylaxis.    RTO for her next scheduled appointment.         Subjective      53-year-old female here for a preop clearance for her right foot surgery on  of this year with Dr. Dominguez.  Patient did blood work in January.  Patient will get an EKG today with us as per the surgeon's request.  Patient denies any acute symptoms.      Review of Systems   Constitutional:  Negative for activity change, appetite change, chills, fatigue, fever and unexpected weight change.   HENT:  Negative for congestion and sore throat.    Eyes:  Negative for visual disturbance.   Respiratory:  Negative for cough and shortness of breath.    Cardiovascular:  Negative for chest pain and palpitations.   Gastrointestinal:  Negative for abdominal pain, blood in stool, constipation, diarrhea, nausea and vomiting.   Genitourinary:  Negative for dysuria and hematuria.   Musculoskeletal:  Positive for arthralgias.   Skin:  Negative for rash.   Neurological:  Negative for dizziness and headaches.   Psychiatric/Behavioral:  Negative for dysphoric mood. The patient is not nervous/anxious.        Current Outpatient Medications on File Prior to Visit   Medication  Sig   • Aspirin-Acetaminophen-Caffeine (EXCEDRIN PO) Take 1 tablet by mouth as needed   • Biotin 10 MG CAPS Take 1 capsule by mouth daily   • buPROPion (WELLBUTRIN XL) 150 mg 24 hr tablet Take 150 mg by mouth every morning   • COLLAGEN PO Take 1 Scoop by mouth daily   • Cyanocobalamin (VITAMIN B 12 PO) Take 1 tablet by mouth every other day   • gabapentin (NEURONTIN) 300 mg capsule Take 1 capsule (300 mg total) by mouth daily at bedtime   • losartan (COZAAR) 25 mg tablet TAKE 1 TABLET (25 MG TOTAL) BY MOUTH DAILY.   • Misc Natural Products (APPLE CIDER VINEGAR DIET PO) Take 1 capsule by mouth daily   • naproxen (NAPROSYN) 500 mg tablet TAKE 1 TABLET BY MOUTH TWICE A DAY WITH FOOD   • Omega-3 1000 MG CAPS Take 1 capsule by mouth daily   • sulfamethoxazole-trimethoprim (BACTRIM DS) 800-160 mg per tablet    • triamcinolone (KENALOG) 0.1 % ointment Apply 1 Application topically 2 (two) times a day To affected area   • VITAMIN A PO Take 1 capsule by mouth every other day   • VITAMIN D PO Take 1 tablet by mouth in the morning   • ibuprofen (MOTRIN) 600 mg tablet Take 600 mg by mouth every 6 (six) hours as needed (Patient not taking: Reported on 2/7/2024)   • valACYclovir (VALTREX) 500 mg tablet Take 500 mg by mouth daily (Patient not taking: Reported on 10/11/2023)   • [DISCONTINUED] clindamycin (CLINDAGEL) 1 % gel  (Patient not taking: Reported on 12/13/2023)   • [DISCONTINUED] clotrimazole-betamethasone (LOTRISONE) 1-0.05 % cream Apply 1 application topically 2 (two) times a day To affected area (Patient not taking: Reported on 12/13/2023)   • [DISCONTINUED] gabapentin (Neurontin) 300 mg capsule Take 1 capsule (300 mg total) by mouth 3 (three) times a day (Patient not taking: Reported on 2/7/2024)   • [DISCONTINUED] gabapentin (Neurontin) 300 mg capsule Take 1 capsule (300 mg total) by mouth 3 (three) times a day   • [DISCONTINUED] NIFEdipine ER (ADALAT CC) 30 MG 24 hr tablet Take 1 tablet (30 mg total) by mouth daily  "(Patient not taking: Reported on 2/7/2024)       Objective     /82   Pulse 80   Temp (!) 97.4 °F (36.3 °C) (Temporal)   Resp 16   Ht 5' 1\" (1.549 m)   Wt 88 kg (194 lb)   SpO2 96%   BMI 36.66 kg/m²     Physical Exam  Vitals reviewed.   Constitutional:       General: She is not in acute distress.     Appearance: Normal appearance. She is obese. She is not ill-appearing.   HENT:      Head: Normocephalic and atraumatic.      Mouth/Throat:      Pharynx: Oropharynx is clear.   Eyes:      Extraocular Movements: Extraocular movements intact.      Conjunctiva/sclera: Conjunctivae normal.   Neck:      Vascular: No carotid bruit.   Cardiovascular:      Rate and Rhythm: Normal rate and regular rhythm.      Comments: EKG today with normal sinus rhythm at 88 bpm.  No acute or new changes.  Pulmonary:      Effort: Pulmonary effort is normal.      Breath sounds: Normal breath sounds.   Abdominal:      General: Bowel sounds are normal.      Palpations: Abdomen is soft.      Tenderness: There is no right CVA tenderness, left CVA tenderness or guarding.   Musculoskeletal:      Cervical back: Neck supple.      Right lower leg: No edema.      Left lower leg: No edema.      Comments: No calf tenderness bilateral.   Lymphadenopathy:      Cervical: No cervical adenopathy.   Skin:     General: Skin is warm.      Findings: No rash.   Neurological:      General: No focal deficit present.      Mental Status: She is alert and oriented to person, place, and time.   Psychiatric:         Mood and Affect: Mood normal.         Behavior: Behavior normal.       Stuart Benson MD    "

## 2024-02-15 ENCOUNTER — ANESTHESIA EVENT (OUTPATIENT)
Dept: PERIOP | Facility: HOSPITAL | Age: 54
End: 2024-02-15
Payer: COMMERCIAL

## 2024-02-16 ENCOUNTER — ANESTHESIA (OUTPATIENT)
Dept: PERIOP | Facility: HOSPITAL | Age: 54
End: 2024-02-16
Payer: COMMERCIAL

## 2024-02-16 ENCOUNTER — APPOINTMENT (OUTPATIENT)
Dept: RADIOLOGY | Facility: HOSPITAL | Age: 54
End: 2024-02-16
Payer: COMMERCIAL

## 2024-02-16 ENCOUNTER — HOSPITAL ENCOUNTER (OUTPATIENT)
Facility: HOSPITAL | Age: 54
Setting detail: OUTPATIENT SURGERY
Discharge: HOME | End: 2024-02-16
Attending: PODIATRIST | Admitting: PODIATRIST
Payer: COMMERCIAL

## 2024-02-16 ENCOUNTER — HOSPITAL ENCOUNTER (OUTPATIENT)
Dept: RADIOLOGY | Facility: HOSPITAL | Age: 54
Setting detail: OUTPATIENT SURGERY
Discharge: HOME | End: 2024-02-16
Payer: COMMERCIAL

## 2024-02-16 VITALS
HEART RATE: 93 BPM | RESPIRATION RATE: 18 BRPM | DIASTOLIC BLOOD PRESSURE: 69 MMHG | BODY MASS INDEX: 36.3 KG/M2 | WEIGHT: 192.24 LBS | HEIGHT: 61 IN | OXYGEN SATURATION: 94 % | SYSTOLIC BLOOD PRESSURE: 144 MMHG | TEMPERATURE: 97.9 F

## 2024-02-16 DIAGNOSIS — M20.61 ACQUIRED DEFORMITY OF RIGHT TOE: ICD-10-CM

## 2024-02-16 DIAGNOSIS — G89.18 POST-OP PAIN: Primary | ICD-10-CM

## 2024-02-16 LAB
EXT PREGNANCY TEST URINE: NEGATIVE
EXT. CONTROL: NORMAL

## 2024-02-16 PROCEDURE — 81025 URINE PREGNANCY TEST: CPT | Performed by: ANESTHESIOLOGY

## 2024-02-16 PROCEDURE — 73630 X-RAY EXAM OF FOOT: CPT

## 2024-02-16 PROCEDURE — NC001 PR NO CHARGE: Performed by: PODIATRIST

## 2024-02-16 PROCEDURE — 99024 POSTOP FOLLOW-UP VISIT: CPT | Performed by: PODIATRIST

## 2024-02-16 PROCEDURE — 28899 UNLISTED PX FOOT/TOES: CPT | Performed by: PODIATRIST

## 2024-02-16 RX ORDER — HYDROMORPHONE HCL/PF 1 MG/ML
SYRINGE (ML) INJECTION AS NEEDED
Status: DISCONTINUED | OUTPATIENT
Start: 2024-02-16 | End: 2024-02-16

## 2024-02-16 RX ORDER — LIDOCAINE HYDROCHLORIDE 20 MG/ML
INJECTION, SOLUTION EPIDURAL; INFILTRATION; INTRACAUDAL; PERINEURAL AS NEEDED
Status: DISCONTINUED | OUTPATIENT
Start: 2024-02-16 | End: 2024-02-16

## 2024-02-16 RX ORDER — ONDANSETRON 2 MG/ML
INJECTION INTRAMUSCULAR; INTRAVENOUS AS NEEDED
Status: DISCONTINUED | OUTPATIENT
Start: 2024-02-16 | End: 2024-02-16

## 2024-02-16 RX ORDER — BUPIVACAINE HYDROCHLORIDE 5 MG/ML
INJECTION, SOLUTION EPIDURAL; INTRACAUDAL AS NEEDED
Status: DISCONTINUED | OUTPATIENT
Start: 2024-02-16 | End: 2024-02-16 | Stop reason: HOSPADM

## 2024-02-16 RX ORDER — OXYCODONE HYDROCHLORIDE AND ACETAMINOPHEN 5; 325 MG/1; MG/1
1 TABLET ORAL EVERY 4 HOURS PRN
Qty: 10 TABLET | Refills: 0 | Status: SHIPPED | OUTPATIENT
Start: 2024-02-16

## 2024-02-16 RX ORDER — FENTANYL CITRATE/PF 50 MCG/ML
50 SYRINGE (ML) INJECTION
Status: DISCONTINUED | OUTPATIENT
Start: 2024-02-16 | End: 2024-02-16 | Stop reason: HOSPADM

## 2024-02-16 RX ORDER — FENTANYL CITRATE 50 UG/ML
INJECTION, SOLUTION INTRAMUSCULAR; INTRAVENOUS AS NEEDED
Status: DISCONTINUED | OUTPATIENT
Start: 2024-02-16 | End: 2024-02-16

## 2024-02-16 RX ORDER — MAGNESIUM HYDROXIDE 1200 MG/15ML
LIQUID ORAL AS NEEDED
Status: DISCONTINUED | OUTPATIENT
Start: 2024-02-16 | End: 2024-02-16 | Stop reason: HOSPADM

## 2024-02-16 RX ORDER — CEFAZOLIN SODIUM 2 G/50ML
2000 SOLUTION INTRAVENOUS ONCE
Status: COMPLETED | OUTPATIENT
Start: 2024-02-16 | End: 2024-02-16

## 2024-02-16 RX ORDER — OXYCODONE HYDROCHLORIDE AND ACETAMINOPHEN 5; 325 MG/1; MG/1
1 TABLET ORAL EVERY 4 HOURS PRN
Status: DISCONTINUED | OUTPATIENT
Start: 2024-02-16 | End: 2024-02-16 | Stop reason: HOSPADM

## 2024-02-16 RX ORDER — SODIUM CHLORIDE 9 MG/ML
125 INJECTION, SOLUTION INTRAVENOUS CONTINUOUS
Status: DISCONTINUED | OUTPATIENT
Start: 2024-02-16 | End: 2024-02-16 | Stop reason: HOSPADM

## 2024-02-16 RX ORDER — PROPOFOL 10 MG/ML
INJECTION, EMULSION INTRAVENOUS AS NEEDED
Status: DISCONTINUED | OUTPATIENT
Start: 2024-02-16 | End: 2024-02-16

## 2024-02-16 RX ORDER — DEXAMETHASONE SODIUM PHOSPHATE 10 MG/ML
INJECTION, SOLUTION INTRAMUSCULAR; INTRAVENOUS AS NEEDED
Status: DISCONTINUED | OUTPATIENT
Start: 2024-02-16 | End: 2024-02-16

## 2024-02-16 RX ORDER — OXYCODONE HYDROCHLORIDE AND ACETAMINOPHEN 5; 325 MG/1; MG/1
1 TABLET ORAL EVERY 4 HOURS PRN
Status: DISCONTINUED | OUTPATIENT
Start: 2024-02-16 | End: 2024-02-16

## 2024-02-16 RX ORDER — ONDANSETRON 2 MG/ML
4 INJECTION INTRAMUSCULAR; INTRAVENOUS EVERY 6 HOURS PRN
Status: DISCONTINUED | OUTPATIENT
Start: 2024-02-16 | End: 2024-02-16 | Stop reason: HOSPADM

## 2024-02-16 RX ORDER — MIDAZOLAM HYDROCHLORIDE 2 MG/2ML
INJECTION, SOLUTION INTRAMUSCULAR; INTRAVENOUS AS NEEDED
Status: DISCONTINUED | OUTPATIENT
Start: 2024-02-16 | End: 2024-02-16

## 2024-02-16 RX ADMIN — FENTANYL CITRATE 50 MCG: 50 INJECTION INTRAMUSCULAR; INTRAVENOUS at 13:09

## 2024-02-16 RX ADMIN — OXYCODONE HYDROCHLORIDE AND ACETAMINOPHEN 1 TABLET: 5; 325 TABLET ORAL at 14:07

## 2024-02-16 RX ADMIN — PROPOFOL 200 MG: 10 INJECTION, EMULSION INTRAVENOUS at 11:58

## 2024-02-16 RX ADMIN — FENTANYL CITRATE 50 MCG: 50 INJECTION INTRAMUSCULAR; INTRAVENOUS at 11:58

## 2024-02-16 RX ADMIN — FENTANYL CITRATE 50 MCG: 50 INJECTION INTRAMUSCULAR; INTRAVENOUS at 12:03

## 2024-02-16 RX ADMIN — MIDAZOLAM 2 MG: 1 INJECTION INTRAMUSCULAR; INTRAVENOUS at 11:51

## 2024-02-16 RX ADMIN — LIDOCAINE HYDROCHLORIDE 100 MG: 20 INJECTION, SOLUTION EPIDURAL; INFILTRATION; INTRACAUDAL at 11:58

## 2024-02-16 RX ADMIN — CEFAZOLIN SODIUM 2000 MG: 2 SOLUTION INTRAVENOUS at 12:01

## 2024-02-16 RX ADMIN — SODIUM CHLORIDE 10 MCG: 9 INJECTION, SOLUTION INTRAVENOUS at 12:20

## 2024-02-16 RX ADMIN — ONDANSETRON 4 MG: 2 INJECTION INTRAMUSCULAR; INTRAVENOUS at 12:36

## 2024-02-16 RX ADMIN — HYDROMORPHONE HYDROCHLORIDE 1 MG: 1 INJECTION, SOLUTION INTRAMUSCULAR; INTRAVENOUS; SUBCUTANEOUS at 12:36

## 2024-02-16 RX ADMIN — SODIUM CHLORIDE 125 ML/HR: 0.9 INJECTION, SOLUTION INTRAVENOUS at 08:58

## 2024-02-16 RX ADMIN — SODIUM CHLORIDE: 0.9 INJECTION, SOLUTION INTRAVENOUS at 12:25

## 2024-02-16 RX ADMIN — DEXAMETHASONE SODIUM PHOSPHATE 10 MG: 10 INJECTION INTRAMUSCULAR; INTRAVENOUS at 11:58

## 2024-02-16 NOTE — OP NOTE
OPERATIVE REPORT - Podiatry  PATIENT NAME: Sharyn Oakes    :  1970  MRN: [id number]  Pt Location: [facility] OR ROOM 03    SURGERY DATE: 2024    Surgeons and Role:     * Bin Dominguez, TRISTEN - Primary     * Cal Tavera, TRISTEN - Assisting     * David Stroud DPM - Assisting    Pre-op Diagnosis:  Acquired deformity of right toe [M20.61]    Post-Op Diagnosis Codes:     * Acquired deformity of right toe [M20.61]    Procedure(s) (LRB):  PLANTAR PLATE REPAIR WITH CAPSULORRAPHY (Right)    Specimen(s):  * No specimens in log *    Estimated Blood Loss:   0 mL    Drains:  * No LDAs found *    Anesthesia Type:   General/LMA with 10 ml of 1% Lidocaine and 0.5% Bupivacaine in a 1:1 mixture  6 ml of 0.5% bupivacaine intra-op    Hemostasis:  Pneumatic ankle tourniquet    Materials:  * No implants in log *      Operative Findings:  Consistent with diagnosis. Plantar plate insufficiency along medial aspect of 2nd MTPJ.    Complications:   None    Procedure and Technique:     Under mild sedation, the patient was brought into the operating room and placed on the operating room table in the supine position. IV sedation was achieved by anesthesia team and a universal timeout was performed where all parties are in agreement of correct patient, correct procedure and correct site. A pneumatic tourniquet was then placed over the patient's right lower extremity with ample padding. A local block was performed consisting of 10 ml of 1% Lidocaine and 0.5% Bupivacaine in a 1:1 mixture. The foot was then prepped and draped in the usual aseptic manner. An esmarch bandage was used to exsangunate the foot and the pneumatic tourniquet was then inflated to 250mmHg.    Attention directed to right foot. Longitudinal incision made along plantar foot just medial to 2nd MTPJ. Blunt dissection through subcutaneous tissue. Care taken to protect any vital neurovascular and tendinous structures. DTIML was transected, exposing plantar plate of 2nd  "MTPJ. Insufficiency of lateral plate noted along medial aspect. PDS used to repair this insufficiency, resulting in a more anatomically aligned 2nd digit.     Surgical site was flushed with nss. Deep closure obtained with vicryl. Skin closure obtained with nylon. 6 ml of 0.5% bupivacaine administered to surgical site. Dressed with betadine soaked adaptic, 4x4 gauze, toshia.     The tourniquet was deflated at approximately 33 min and normal hyperemic response was noted to all digits. The patient tolerated the procedure and anesthesia well without immediate complications and transferred to PACU with vital signs stable.     Dr. Dominguez was present during the entire procedure and participated in all key aspects.    SIGNATURE: David Stroud DPM  DATE: February 16, 2024  TIME: 12:55 PM      Portions of the record may have been created with voice recognition software. Occasional wrong word or \"sound a like\" substitutions may have occurred due to the inherent limitations of voice recognition software. Read the chart carefully and recognize, using context, where substitutions have occurred.    "

## 2024-02-16 NOTE — DISCHARGE SUMMARY
Discharge Summary Outpatient Procedure Podiatry -   Sharyn Oakes 53 y.o. female MRN: [id number]  Unit/Bed#: OR POOL Encounter: [id number]    Admission Date: 2/16/2024     Admitting Diagnosis: Acquired deformity of right toe [M20.61]    Discharge Diagnosis: same    Procedures Performed: PLANTAR PLATE REPAIR WITH CAPSULORRAPHY: 23524 (CPT®)    Complications: none    Condition at Discharge: stable    Discharge instructions/Information to patient and family:   See after visit summary for information provided to patient and family.      Provisions for Follow-Up Care/Important appointments:  See after visit summary for information related to follow-up care and any pertinent home health orders.      Discharge Medications:  See after visit summary for reconciled discharge medications provided to patient and family.

## 2024-02-16 NOTE — ANESTHESIA POSTPROCEDURE EVALUATION
"Post-Op Assessment Note    CV Status:  Stable    Pain management: adequate       Mental Status:  Alert and awake   Hydration Status:  Euvolemic   PONV Controlled:  Controlled   Airway Patency:  Patent     Post Op Vitals Reviewed: Yes    No anethesia notable event occurred.    Staff: Anesthesiologist               BP      Temp      Pulse     Resp      SpO2      /69   Pulse 93   Temp 97.9 °F (36.6 °C) (Temporal)   Resp 18   Ht 5' 1\" (1.549 m)   Wt 87.2 kg (192 lb 3.9 oz)   SpO2 94%   BMI 36.32 kg/m²     "

## 2024-02-16 NOTE — INTERVAL H&P NOTE
H&P reviewed. After examining the patient I find no changes in the patients condition since the H&P had been written.    Vitals:    02/16/24 0850   BP: 135/68   Pulse: 101   Resp: 16   Temp: (!) 97.4 °F (36.3 °C)   SpO2: 96%

## 2024-02-16 NOTE — DISCHARGE INSTR - AVS FIRST PAGE
Dr. Dominguez  Post-Operative Instructions    1. Take your prescribed medication as directed.   2. Upon arrival at home, lie down and elevate your surgical foot on 2 pillows.  3. Stay off your feet as much as possible for the first 24-48 hours. This is when your feet first swell and may become painful. After 48 hours you may begin limited walking following these restrictions:   Nonweightbearing to surgical foot  4. Drink large quantities of water. Consume no alcohol. Continue a well-balanced diet.  5. Report any unusual discomfort or fever to this office.  6. A limited amount of discomfort and swelling is to be expected. In some cases the skin may take on a bruised appearance. The surgical cleansing solution that was applied to your foot prior to the operation is dark in color and the operation site may appear to be oozing when it actually is not.  7. A slight amount of blood is to be expected, and is no cause for alarm. Do not remove the dressings. If there is active bleeding and if the bleeding persists, add additional gauze to the bandage, apply direct pressure, elevate your feet and call this office.  8. Do not get the dressings wet. As regular bathing may be inconvenient, sponge baths are recommended.   9. When anesthesia wears off and if any discomfort should be present, apply an ice pack directly over the operated area for 15 minute intervals for several hours or until the pain leaves. (USE IN EXCESS OF 15 MINUTES COULD CAUSE FROSTBITE). Do not use hot water bags or electric pads. A convenient icepack can be made by placing ice cubes in a plastic bag and covering this with a towel.  10. Take over-the-counter laxative for constipation, this is common with use of narcotic medications.

## 2024-02-16 NOTE — ANESTHESIA PREPROCEDURE EVALUATION
Medical History  History Comments   Bacterial vaginosis    Obesity    Arthritis    Migraine    Varicella    Bilateral bunions    COVID-19      Procedure:  ARTHRODESIS / FUSION FOOT (Right: Foot)  REPAIR TENDON FOOT (Right: Foot)    Relevant Problems   ANESTHESIA (within normal limits)      CARDIO   (+) Essential hypertension      MUSCULOSKELETAL   (+) Osteoarthritis of left knee   (+) Tricompartment osteoarthritis of right knee      Other   (+) Class 2 obesity due to excess calories without serious comorbidity with body mass index (BMI) of 35.0 to 35.9 in adult   (+) Obesity, Class II, BMI 35-39.9        Physical Exam    Airway    Mallampati score: II  TM Distance: >3 FB  Neck ROM: full     Dental   No notable dental hx     Cardiovascular  Rate: normal    Pulmonary  Pulmonary exam normal     Other Findings  post-pubertal.      Anesthesia Plan  ASA Score- 2     Anesthesia Type- general with ASA Monitors.         Additional Monitors:     Airway Plan: LMA.           Plan Factors-Exercise tolerance (METS): >4 METS.    Chart reviewed.    Patient summary reviewed.    Patient is not a current smoker. Patient not instructed to abstain from smoking on day of procedure. Patient did not smoke on day of surgery.            Induction- intravenous.    Postoperative Plan-     Informed Consent- Anesthetic plan and risks discussed with patient and spouse (Anuj).

## 2024-02-19 NOTE — PROGRESS NOTES
[facility] - PROCEDURE RESPONSE NOTE    Based on the query that was sent to you, please document below any procedures that were performed.    DTIML stands for deep transverse intermetatarsal ligament    PDS is a type of suture    The repair was made to the capsule/plantar plate using sutures.    Dr. Dominguez

## 2024-02-22 ENCOUNTER — OFFICE VISIT (OUTPATIENT)
Dept: PODIATRY | Facility: CLINIC | Age: 54
End: 2024-02-22

## 2024-02-22 VITALS
RESPIRATION RATE: 18 BRPM | BODY MASS INDEX: 36.32 KG/M2 | HEIGHT: 61 IN | DIASTOLIC BLOOD PRESSURE: 85 MMHG | SYSTOLIC BLOOD PRESSURE: 127 MMHG | HEART RATE: 97 BPM

## 2024-02-22 DIAGNOSIS — M20.61 ACQUIRED DEFORMITY OF RIGHT TOE: Primary | ICD-10-CM

## 2024-02-22 PROCEDURE — 99024 POSTOP FOLLOW-UP VISIT: CPT | Performed by: PODIATRIST

## 2024-02-22 NOTE — PROGRESS NOTES
Patient presents 6 days post plantar plate repair right second toe.  The plantar incision is healing unremarkably and digit is in acceptable alignment.  Patient does have pain within normal limits for procedure performed.  She is wearing an OrthoWedge shoe and has been told to try to keep pressure off the foot as much as possible.  She is unable to walk nonweightbearing due to her left knee issue.    Patient to be seen next week by Dr. Lopez to ensure no evidence of infection.  It is anticipated that sutures will be left in for 2 more weeks.  She will be seeing me for suture removal in 2 weeks.

## 2024-02-28 ENCOUNTER — TELEPHONE (OUTPATIENT)
Age: 54
End: 2024-02-28

## 2024-02-28 NOTE — TELEPHONE ENCOUNTER
Caller: Sharyn Oakes    Doctor: Ayse/ Cabrera Claros    Reason for call: Needs to be seen later in the day.  Was originally scheduled for 9:45.  The office changed it to 3:45.   She is in for 9:45 still.   She cannot come at 9:45, as she arranged for transportation for the 3:45 visit.  Can she still be seen at 3:45?    Call back#: [phone]

## 2024-02-29 ENCOUNTER — OFFICE VISIT (OUTPATIENT)
Dept: PODIATRY | Facility: CLINIC | Age: 54
End: 2024-02-29

## 2024-02-29 VITALS — HEART RATE: 96 BPM | SYSTOLIC BLOOD PRESSURE: 133 MMHG | DIASTOLIC BLOOD PRESSURE: 87 MMHG

## 2024-02-29 DIAGNOSIS — S99.921D INJURY OF PLANTAR PLATE OF RIGHT FOOT, SUBSEQUENT ENCOUNTER: Primary | ICD-10-CM

## 2024-02-29 PROCEDURE — 99024 POSTOP FOLLOW-UP VISIT: CPT | Performed by: PODIATRIST

## 2024-02-29 NOTE — PROGRESS NOTES
Assessment/Plan:      Diagnoses and all orders for this visit:    Injury of plantar plate of right foot, subsequent encounter      Patient is stable postop 1.5 weeks    Incision: stable, suture intact until next week    Instructions given to patient. Rest the foot as much as possible and elevate/ice  if swollen.    Ambulatory status: heel WB only, use RW    Images reviewed today: none    RTC: she is stable, see picture of the corrected toe. RTC 1 week      Subjective:     Patient ID: Sharyn Oakes is a 53 y.o. female.    DOS: 2/16/2024     Procedure: right 2nd plantar plate repair (Dr. Dominguez surgeon).      Condition: Dressing C/D/I. PAin slowly improving. She is using the RW and wedge shoe, staying on the heel.       Interpretor [id number]        Review of Systems      Objective:     Physical Exam  Vitals reviewed.   Cardiovascular:      Pulses: Normal pulses.   Musculoskeletal:      Comments: 2nd toe is in line with 1,3 digits, see pic. Expected tendernesss   Skin:     Comments: Incicion stable. Sutures intact     Neurological:      Mental Status: She is alert.

## 2024-03-07 ENCOUNTER — OFFICE VISIT (OUTPATIENT)
Dept: PODIATRY | Facility: CLINIC | Age: 54
End: 2024-03-07

## 2024-03-07 VITALS — DIASTOLIC BLOOD PRESSURE: 94 MMHG | SYSTOLIC BLOOD PRESSURE: 138 MMHG | HEART RATE: 109 BPM

## 2024-03-07 DIAGNOSIS — S99.921D INJURY OF PLANTAR PLATE OF RIGHT FOOT, SUBSEQUENT ENCOUNTER: Primary | ICD-10-CM

## 2024-03-07 PROCEDURE — 99024 POSTOP FOLLOW-UP VISIT: CPT | Performed by: PODIATRIST

## 2024-03-07 NOTE — PROGRESS NOTES
Assessment/Plan:      Diagnoses and all orders for this visit:    Injury of plantar plate of right foot, subsequent encounter      Patient is stable postop 3 weeks    Incision: Healed, sutures removed.    Instructions given to patient. Rest the foot as much as possible and elevate/ice  if swollen.    Ambulatory status: Light weightbearing in Darco wedge shoe for 3 more weeks.  I would refrain from any forefoot propulsion for at least 6 weeks from the time of surgery.  We will check her again in a few weeks where she will see Dr. Dominguez, her surgeon.  I did recommend daily splinting the toe in plantarflexion with a small piece of tape in order to help support the repaired capsule.  I demonstrated how to split this to both her and her  using the     Images reviewed today: None    RTC: 2 to 3 weeks      Subjective:     Patient ID: Sharyn Oakes is a 53 y.o. female.    DOS: 2/16/2024     Procedure: right 2nd plantar plate repair (Dr. Dominguez surgeon).      Condition: Dressing C/D/I.  The incision is healed and there is no drainage.  There is still tenderness to the plantar foot but she states it is slowly improving.  An  was used throughout the entire visit and her partner was present in the room.          Review of Systems      Objective:     Physical Exam  Vitals reviewed.   Constitutional:       Appearance: She is not ill-appearing or diaphoretic.   Cardiovascular:      Rate and Rhythm: Normal rate.      Pulses: Normal pulses.   Pulmonary:      Effort: Pulmonary effort is normal. No respiratory distress.   Skin:     Capillary Refill: Capillary refill takes less than 2 seconds.      Findings: No erythema or rash.   Neurological:      Mental Status: She is alert and oriented to person, place, and time.      Sensory: No sensory deficit.      Gait: Gait normal.   Psychiatric:         Mood and Affect: Mood normal.      Incision plantar foot is healed.  Sutures removed.  No drainage.  Minor  tenderness.  Overall correction of the toe maintained.

## 2024-03-28 ENCOUNTER — OFFICE VISIT (OUTPATIENT)
Dept: PODIATRY | Facility: CLINIC | Age: 54
End: 2024-03-28

## 2024-03-28 VITALS
DIASTOLIC BLOOD PRESSURE: 83 MMHG | RESPIRATION RATE: 18 BRPM | HEART RATE: 90 BPM | SYSTOLIC BLOOD PRESSURE: 123 MMHG | HEIGHT: 61 IN | BODY MASS INDEX: 36.32 KG/M2

## 2024-03-28 DIAGNOSIS — M20.41 HAMMER TOE OF RIGHT FOOT: ICD-10-CM

## 2024-03-28 DIAGNOSIS — S99.921D INJURY OF PLANTAR PLATE OF RIGHT FOOT, SUBSEQUENT ENCOUNTER: Primary | ICD-10-CM

## 2024-03-28 PROCEDURE — 99024 POSTOP FOLLOW-UP VISIT: CPT | Performed by: PODIATRIST

## 2024-03-28 NOTE — PROGRESS NOTES
Patient presents for assessment of right second toe which underwent plantar plate repair approximately 6 weeks ago.  She is still wearing the OrthoWedge surgical shoe but has been taping the toe into plantarflexion.  The incision on the bottom of the right foot is virtually healed with no  evidence of hypertrophic scarring.    Patient still has pain with direct pressure.  She takes ibuprofen off and on.  Patient still not able to return to work as she has a ambulatory position.    The right second toe alignment is acceptable.  The third toe still drifts beneath it on occasion but overall there is significant improvement from preop.  Patient told to continue to tape the toe into plantarflexion.  She may return to a comfortable running shoe pain permitted.  Reappoint 3 weeks.

## 2024-04-01 ENCOUNTER — TELEPHONE (OUTPATIENT)
Age: 54
End: 2024-04-01

## 2024-04-01 NOTE — TELEPHONE ENCOUNTER
Caller: Sharyn Oakes    Doctor and/or Office: Dr. Dominguez/[facility]    #: [phone]    Escalation: Disability forms/Patient calling as she gave more paperwork at office visit 3/28/24 and has not heard back from them/I checked the spread sheet and no paperwork has been sent to them as far as I can see. Please call pt back and advise as to where the paperwork is/when it will be completed-- as she said office staff faxed to the disability Dept. Czech speaking only/needed . Thanks

## 2024-04-18 ENCOUNTER — OFFICE VISIT (OUTPATIENT)
Dept: PODIATRY | Facility: CLINIC | Age: 54
End: 2024-04-18
Payer: COMMERCIAL

## 2024-04-18 VITALS
BODY MASS INDEX: 36.32 KG/M2 | HEART RATE: 65 BPM | SYSTOLIC BLOOD PRESSURE: 125 MMHG | DIASTOLIC BLOOD PRESSURE: 80 MMHG | RESPIRATION RATE: 18 BRPM | HEIGHT: 61 IN

## 2024-04-18 DIAGNOSIS — S99.921D INJURY OF PLANTAR PLATE OF RIGHT FOOT, SUBSEQUENT ENCOUNTER: Primary | ICD-10-CM

## 2024-04-18 PROCEDURE — 99212 OFFICE O/P EST SF 10 MIN: CPT | Performed by: PODIATRIST

## 2024-04-18 NOTE — PROGRESS NOTES
Patient presents for assessment of right second toe and plantar plate injury.  She is approximately 9 weeks post repair.  She is now walking in a shoe.  She can wear it for approximately 4 to 5 hours but then has discomfort.  She is taking ibuprofen 600 mg 2 or 3 times daily.    On exam, right second toe still off ground will likely always be so.  She was told that she may try to remove the tape and walk without it as she has been taping the toe into plantarflexion.  There is discomfort with palpation but it is much improved over the last visit.    The patient has an ambulatory job and is unable to return to work at this time due to the persistent pain.  She requests a note to be sent to work to extend her leave.  She will be reassessed in 4 weeks.

## 2024-04-22 ENCOUNTER — TELEPHONE (OUTPATIENT)
Age: 54
End: 2024-04-22

## 2024-04-22 NOTE — TELEPHONE ENCOUNTER
Caller: Sharyn     Doctor and/or Office: Deepali SADLER#: [phone]    Escalation: Care Did not want to tell me the message

## 2024-04-24 ENCOUNTER — TELEPHONE (OUTPATIENT)
Dept: BARIATRICS | Facility: CLINIC | Age: 54
End: 2024-04-24

## 2024-05-16 ENCOUNTER — OFFICE VISIT (OUTPATIENT)
Dept: PODIATRY | Facility: CLINIC | Age: 54
End: 2024-05-16

## 2024-05-16 VITALS
SYSTOLIC BLOOD PRESSURE: 127 MMHG | HEIGHT: 61 IN | RESPIRATION RATE: 18 BRPM | DIASTOLIC BLOOD PRESSURE: 84 MMHG | HEART RATE: 91 BPM | BODY MASS INDEX: 36.32 KG/M2

## 2024-05-16 DIAGNOSIS — S99.921D INJURY OF PLANTAR PLATE OF RIGHT FOOT, SUBSEQUENT ENCOUNTER: Primary | ICD-10-CM

## 2024-05-16 DIAGNOSIS — M20.61 ACQUIRED DEFORMITY OF RIGHT TOE: ICD-10-CM

## 2024-05-16 PROCEDURE — 99024 POSTOP FOLLOW-UP VISIT: CPT | Performed by: PODIATRIST

## 2024-05-16 NOTE — PROGRESS NOTES
Patient presents 3 months post plantar plate repair right second toe.  Patient is walking in a running shoe and states that she has discomfort but it is tolerable.  The right second toe continues to elevate above the plane of other toes if not taped and when she bears weight on the foot.  She is likely torn through the sutures that were utilized to stabilize the toe.    Patient advised that she may return to work on Monday.  She was given a note for sedentary employment and to wear a running shoe to minimize walking.  She should not utilize heavy machinery.  She will be reassessed in 3 months.

## 2024-06-12 ENCOUNTER — OFFICE VISIT (OUTPATIENT)
Dept: FAMILY MEDICINE CLINIC | Facility: CLINIC | Age: 54
End: 2024-06-12
Payer: COMMERCIAL

## 2024-06-12 VITALS
OXYGEN SATURATION: 97 % | RESPIRATION RATE: 16 BRPM | BODY MASS INDEX: 34.74 KG/M2 | HEART RATE: 84 BPM | SYSTOLIC BLOOD PRESSURE: 126 MMHG | WEIGHT: 184 LBS | HEIGHT: 61 IN | DIASTOLIC BLOOD PRESSURE: 66 MMHG | TEMPERATURE: 97.2 F

## 2024-06-12 DIAGNOSIS — Z11.4 SCREENING FOR HIV (HUMAN IMMUNODEFICIENCY VIRUS): ICD-10-CM

## 2024-06-12 DIAGNOSIS — I10 ESSENTIAL HYPERTENSION: ICD-10-CM

## 2024-06-12 DIAGNOSIS — Z12.4 SCREENING FOR CERVICAL CANCER: ICD-10-CM

## 2024-06-12 DIAGNOSIS — Z00.00 ANNUAL PHYSICAL EXAM: Primary | ICD-10-CM

## 2024-06-12 DIAGNOSIS — E55.9 VITAMIN D DEFICIENCY: ICD-10-CM

## 2024-06-12 DIAGNOSIS — M54.40 ACUTE RIGHT-SIDED LOW BACK PAIN WITH SCIATICA, SCIATICA LATERALITY UNSPECIFIED: ICD-10-CM

## 2024-06-12 DIAGNOSIS — E04.1 NODULAR THYROID DISEASE: ICD-10-CM

## 2024-06-12 DIAGNOSIS — Z13.1 SCREENING FOR DIABETES MELLITUS: ICD-10-CM

## 2024-06-12 DIAGNOSIS — E66.09 CLASS 1 OBESITY DUE TO EXCESS CALORIES WITHOUT SERIOUS COMORBIDITY WITH BODY MASS INDEX (BMI) OF 34.0 TO 34.9 IN ADULT: ICD-10-CM

## 2024-06-12 DIAGNOSIS — Z12.31 ENCOUNTER FOR SCREENING MAMMOGRAM FOR BREAST CANCER: ICD-10-CM

## 2024-06-12 DIAGNOSIS — Z23 NEED FOR VACCINATION: ICD-10-CM

## 2024-06-12 PROBLEM — E66.812 CLASS 2 OBESITY DUE TO EXCESS CALORIES WITHOUT SERIOUS COMORBIDITY WITH BODY MASS INDEX (BMI) OF 35.0 TO 35.9 IN ADULT: Status: RESOLVED | Noted: 2023-06-09 | Resolved: 2024-06-12

## 2024-06-12 PROBLEM — E66.9 OBESITY, CLASS II, BMI 35-39.9: Status: RESOLVED | Noted: 2023-12-13 | Resolved: 2024-06-12

## 2024-06-12 PROBLEM — E66.812 OBESITY, CLASS II, BMI 35-39.9: Status: RESOLVED | Noted: 2023-12-13 | Resolved: 2024-06-12

## 2024-06-12 PROCEDURE — 99213 OFFICE O/P EST LOW 20 MIN: CPT | Performed by: FAMILY MEDICINE

## 2024-06-12 PROCEDURE — 99396 PREV VISIT EST AGE 40-64: CPT | Performed by: FAMILY MEDICINE

## 2024-06-12 RX ORDER — DEXAMETHASONE SODIUM PHOSPHATE 1 MG/ML
SOLUTION/ DROPS OPHTHALMIC
COMMUNITY
Start: 2024-05-16

## 2024-06-12 RX ORDER — ZOSTER VACCINE RECOMBINANT, ADJUVANTED 50 MCG/0.5
0.5 KIT INTRAMUSCULAR ONCE
Qty: 1 EACH | Refills: 1 | Status: SHIPPED | OUTPATIENT
Start: 2024-06-12 | End: 2024-06-12

## 2024-06-12 RX ORDER — CYCLOSPORINE 0.5 MG/ML
EMULSION OPHTHALMIC
COMMUNITY
Start: 2024-05-16

## 2024-06-12 NOTE — PATIENT INSTRUCTIONS
Wellness Visit for Adults   AMBULATORY CARE:   A wellness visit  is when you see your healthcare provider to get screened for health problems. Your healthcare provider will also give you advice on how to stay healthy. Write down your questions so you remember to ask them. Ask your healthcare provider how often you should have a wellness visit.  What happens at a wellness visit:  Your healthcare provider will ask about your health, and your family history of health problems. This includes high blood pressure, heart disease, and cancer. He or she will ask if you have symptoms that concern you, if you smoke, and about your mood. You may also be asked about your intake of medicines, supplements, food, and alcohol. Any of the following may be done:  Your weight  will be checked. Your height may also be checked so your body mass index (BMI) can be calculated. Your BMI shows if you are at a healthy weight.    Your blood pressure  and heart rate will be checked. Your temperature may also be checked.    Blood and urine tests  may be done. Blood tests may be done to check your cholesterol levels. Abnormal cholesterol levels increase your risk for heart disease and stroke. You may also need a blood or urine test to check for diabetes if you are at increased risk. Urine tests may be done to look for signs of an infection or kidney disease.    A physical exam  includes checking your heartbeat and lungs with a stethoscope. Your healthcare provider may also check your skin to look for sun damage.    Screening tests  may be recommended. A screening test is done to check for diseases that may not cause symptoms. The screening tests you may need depend on your age, gender, family history, and lifestyle habits. For example, colorectal screening may be recommended if you are 50 years old or older.    Screening tests you need if you are a woman:   A Pap smear  is used to screen for cervical cancer. Pap smears are usually done every 3 to  5 years depending on your age. You may need them more often if you have had abnormal Pap smear test results in the past. Ask your healthcare provider how often you should have a Pap smear.    A mammogram  is an x-ray of your breasts to screen for breast cancer. Experts recommend mammograms every 2 years starting at age 50 years. You may need a mammogram at age 49 years or younger if you have an increased risk for breast cancer. Talk to your healthcare provider about when you should start having mammograms and how often you need them.    Vaccines you may need:   Get an influenza vaccine  every year. The influenza vaccine protects you from the flu. Several types of viruses cause the flu. The viruses change over time, so new vaccines are made each year.    Get a tetanus-diphtheria (Td) booster vaccine  every 10 years. This vaccine protects you against tetanus and diphtheria. Tetanus is a severe infection that may cause painful muscle spasms and lockjaw. Diphtheria is a severe bacterial infection that causes a thick covering in the back of your mouth and throat.    Get a human papillomavirus (HPV) vaccine  if you are female and aged 19 to 26 or male 19 to 21 and never received it. This vaccine protects you from HPV infection. HPV is the most common infection spread by sexual contact. HPV may also cause vaginal, penile, and anal cancers.    Get a pneumococcal vaccine  if you are aged 65 years or older. The pneumococcal vaccine is an injection given to protect you from pneumococcal disease. Pneumococcal disease is an infection caused by pneumococcal bacteria. The infection may cause pneumonia, meningitis, or an ear infection.    Get a shingles vaccine  if you are 60 or older, even if you have had shingles before. The shingles vaccine is an injection to protect you from the varicella-zoster virus. This is the same virus that causes chickenpox. Shingles is a painful rash that develops in people who had chickenpox or have  been exposed to the virus.    How to eat healthy:  My Plate is a model for planning healthy meals. It shows the types and amounts of foods that should go on your plate. Fruits and vegetables make up about half of your plate, and grains and protein make up the other half. A serving of dairy is included on the side of your plate. The amount of calories and serving sizes you need depends on your age, gender, weight, and height. Examples of healthy foods are listed below:  Eat a variety of vegetables  such as dark green, red, and orange vegetables. You can also include canned vegetables low in sodium (salt) and frozen vegetables without added butter or sauces.    Eat a variety of fresh fruits , canned fruit in 100% juice, frozen fruit, and dried fruit.    Include whole grains.  At least half of the grains you eat should be whole grains. Examples include whole-wheat bread, wheat pasta, brown rice, and whole-grain cereals such as oatmeal.    Eat a variety of protein foods such as seafood (fish and shellfish), lean meat, and poultry without skin (turkey and chicken). Examples of lean meats include pork leg, shoulder, or tenderloin, and beef round, sirloin, tenderloin, and extra lean ground beef. Other protein foods include eggs and egg substitutes, beans, peas, soy products, nuts, and seeds.    Choose low-fat dairy products such as skim or 1% milk or low-fat yogurt, cheese, and cottage cheese.    Limit unhealthy fats  such as butter, hard margarine, and shortening.       Exercise:  Exercise at least 30 minutes per day on most days of the week. Some examples of exercise include walking, biking, dancing, and swimming. You can also fit in more physical activity by taking the stairs instead of the elevator or parking farther away from stores. Include muscle strengthening activities 2 days each week. Regular exercise provides many health benefits. It helps you manage your weight, and decreases your risk for type 2 diabetes,  heart disease, stroke, and high blood pressure. Exercise can also help improve your mood. Ask your healthcare provider about the best exercise plan for you.       General health and safety guidelines:   Do not smoke.  Nicotine and other chemicals in cigarettes and cigars can cause lung damage. Ask your healthcare provider for information if you currently smoke and need help to quit. E-cigarettes or smokeless tobacco still contain nicotine. Talk to your healthcare provider before you use these products.    Limit alcohol.  A drink of alcohol is 12 ounces of beer, 5 ounces of wine, or 1½ ounces of liquor.    Lose weight, if needed.  Being overweight increases your risk of certain health conditions. These include heart disease, high blood pressure, type 2 diabetes, and certain types of cancer.    Protect your skin.  Do not sunbathe or use tanning beds. Use sunscreen with a SPF 15 or higher. Apply sunscreen at least 15 minutes before you go outside. Reapply sunscreen every 2 hours. Wear protective clothing, hats, and sunglasses when you are outside.    Drive safely.  Always wear your seatbelt. Make sure everyone in your car wears a seatbelt. A seatbelt can save your life if you are in an accident. Do not use your cell phone when you are driving. This could distract you and cause an accident. Pull over if you need to make a call or send a text message.    Practice safe sex.  Use latex condoms if are sexually active and have more than one partner. Your healthcare provider may recommend screening tests for sexually transmitted infections (STIs).    Wear helmets, lifejackets, and protective gear.  Always wear a helmet when you ride a bike or motorcycle, go skiing, or play sports that could cause a head injury. Wear protective equipment when you play sports. Wear a lifejacket when you are on a boat or doing water sports.    © Copyright [employer] 2023 Information is for End User's use only and may not be sold, redistributed or  otherwise used for commercial purposes.  The above information is an  only. It is not intended as medical advice for individual conditions or treatments. Talk to your doctor, nurse or pharmacist before following any medical regimen to see if it is safe and effective for you.    Weight Management   AMBULATORY CARE:   Why it is important to manage your weight:  Being overweight increases your risk of health conditions such as heart disease, high blood pressure, type 2 diabetes, and certain types of cancer. It can also increase your risk for osteoarthritis, sleep apnea, and other respiratory problems. Aim for a slow, steady weight loss. Even a small amount of weight loss can lower your risk of health problems.  Risks of being overweight:  Extra weight can cause many health problems, including the following:  Diabetes (high blood sugar level)    High blood pressure or high cholesterol    Heart disease    Stroke    Gallbladder or liver disease    Cancer of the colon, breast, prostate, liver, or kidney    Sleep apnea    Arthritis or gout    Screening  is done to check for health conditions before you have signs or symptoms. If you are 35 to 70 years old, your blood sugar level may be checked every 3 years for signs of prediabetes or diabetes. Your healthcare provider will check your blood pressure at each visit. High blood pressure can lead to a stroke or other problems. Your provider may check for signs of heart disease, cancer, or other health problems.  How to lose weight safely:  A safe and healthy way to lose weight is to eat fewer calories and get regular exercise.  You can lose up about 1 pound a week by decreasing the number of calories you eat by 500 calories each day. You can decrease calories by eating smaller portion sizes or by cutting out high-calorie foods. Read labels to find out how many calories are in the foods you eat.         You can also burn calories with exercise such as walking,  swimming, or biking. You will be more likely to keep weight off if you make these changes part of your lifestyle. Exercise at least 30 minutes per day on most days of the week. You can also fit in more physical activity by taking the stairs instead of the elevator or parking farther away from stores. Ask your healthcare provider about the best exercise plan for you.       Healthy meal plan for weight management:  A healthy meal plan includes a variety of foods, contains fewer calories, and helps you stay healthy. A healthy meal plan includes the following:     Eat whole-grain foods more often.  A healthy meal plan should contain fiber. Fiber is the part of grains, fruits, and vegetables that is not broken down by your body. Whole-grain foods are healthy and provide extra fiber in your diet. Some examples of whole-grain foods are whole-wheat breads and pastas, oatmeal, brown rice, and bulgur.    Eat a variety of vegetables every day.  Include dark, leafy greens such as spinach, kale, pipo greens, and mustard greens. Eat yellow and orange vegetables such as carrots, sweet potatoes, and winter squash.     Eat a variety of fruits every day.  Choose fresh or canned fruit (canned in its own juice or light syrup) instead of juice. Fruit juice has very little or no fiber.    Eat low-fat dairy foods.  Drink fat-free (skim) milk or 1% milk. Eat fat-free yogurt and low-fat cottage cheese. Try low-fat cheeses such as mozzarella and other reduced-fat cheeses.    Choose meat and other protein foods that are low in fat.  Choose beans or other legumes such as split peas or lentils. Choose fish, skinless poultry (chicken or turkey), or lean cuts of red meat (beef or pork). Before you cook meat or poultry, cut off any visible fat.     Use less fat and oil.  Try baking foods instead of frying them. Add less fat, such as margarine, sour cream, regular salad dressing and mayonnaise to foods. Eat fewer high-fat foods. Some examples of  high-fat foods include french fries, doughnuts, ice cream, and cakes.    Eat fewer sweets.  Limit foods and drinks that are high in sugar. This includes candy, cookies, regular soda, and sweetened drinks.  Ways to decrease calories:   Eat smaller portions.     Use a small plate with smaller servings.    Do not eat second helpings.    When you eat at a restaurant, ask for a box and place half of your meal in the box before you eat.    Share an entrée with someone else.    Replace high-calorie snacks with healthy, low-calorie snacks.     Choose fresh fruit, vegetables, fat-free rice cakes, or air-popped popcorn instead of potato chips, nuts, or chocolate.    Choose water or calorie-free drinks instead of soda or sweetened drinks.    Do not shop for groceries when you are hungry.  You may be more likely to make unhealthy food choices. Take a grocery list of healthy foods and shop after you have eaten.    Eat regular meals. Do not skip meals. Skipping meals can lead to overeating later in the day. This can make it harder for you to lose weight. Eat a healthy snack in place of a meal if you do not have time to eat a regular meal. Talk with a dietitian to help you create a meal plan and schedule that is right for you.    Other things to consider as you try to lose weight:   Be aware of situations that may give you the urge to overeat, such as eating while watching television. Find ways to avoid these situations. For example, read a book, go for a walk, or do crafts.    Meet with a weight loss support group or friends who are also trying to lose weight. This may help you stay motivated to continue working on your weight loss goals.    © Copyright [employer] 2023 Information is for End User's use only and may not be sold, redistributed or otherwise used for commercial purposes.  The above information is an  only. It is not intended as medical advice for individual conditions or treatments. Talk to your doctor,  nurse or pharmacist before following any medical regimen to see if it is safe and effective for you.    Obesity   AMBULATORY CARE:   Obesity  means your body mass index (BMI) is greater than 30. Your healthcare provider will use your age, height, and weight to measure your BMI.  The risks of obesity include  many health problems, including injuries or physical disability.  Diabetes (high blood sugar level)    High blood pressure or high cholesterol    Heart disease or heart failure    Stroke    Gallbladder or liver disease    Cancer of the colon, breast, prostate, liver, or kidney    Sleep apnea    Arthritis or gout    Screening  is done to check for health conditions before you have signs or symptoms. If you are 35 to 70 years old, your blood sugar level may be checked every 3 years for signs of prediabetes or diabetes. Your healthcare provider will check your blood pressure at each visit. High blood pressure can lead to a stroke or other problems. Your provider may check for signs of heart disease, cancer, or other health problems.  Seek care immediately if:   You have a severe headache, confusion, or difficulty speaking.    You have weakness on one side of your body.    You have chest pain, sweating, or shortness of breath.    Call your doctor if:   You have symptoms of gallbladder or liver disease, such as pain in your upper abdomen.    You have knee or hip pain and discomfort while walking.    You have symptoms of diabetes, such as intense hunger and thirst, and frequent urination.    You have symptoms of sleep apnea, such as snoring or daytime sleepiness.    You have questions or concerns about your condition or care.    Treatment for obesity  focuses on helping you lose weight to improve your health. Even a small decrease in BMI can reduce the risk for many health problems. Your healthcare provider will help you set a weight-loss goal.  Lifestyle changes  are the first step in treating obesity. These include  making healthy food choices and getting regular physical activity. Your healthcare provider may suggest a weight-loss program that involves coaching, education, and therapy.    Medicine  may help you lose weight when it is used with a healthy foods and physical activity.    Surgery  can help you lose weight if you have obesity along with other health problems. Several types of weight-loss surgery are available. Ask your healthcare provider for more information.    Tips for safe weight loss:   Set small, realistic goals.  An example of a small goal is to walk for 20 minutes 5 days a week. Anther goal is to lose 5% of your body weight.    Ask for support.  Tell friends, family members, and coworkers about your goals. Ask someone to lose weight with you. You may also want to join a weight-loss support group.    Identify foods or triggers that may cause you to overeat.  Remove tempting high-calorie foods from your home and workplace. Place a bowl of fresh fruit on your kitchen counter. If stress causes you to eat, find other ways to cope with stress. A counselor or therapist may be able to help you.    Track your daily calories and activity.  Write down what you eat and drink. Also write down how many minutes of physical activity you do each day.     Track your weekly weight.  Weigh yourself in the morning, before you eat or drink anything but after you use the bathroom. Use the same scale, in the same place, and in similar clothing each time. Only weigh yourself 1 to 2 times each week, or as directed. You may become discouraged if you weigh yourself every day.    Eating changes:  You will need to eat 500 to 1,000 fewer calories each day than you currently eat to lose 1 to 2 pounds a week. The following changes will help you cut calories:  Eat smaller portions.  Use small plates, no larger than 9 inches in diameter. Fill your plate half full of fruits and vegetables. Measure your food using measuring cups until you know  what a serving size looks like.         Eat 3 meals and 1 or 2 snacks each day.  Plan your meals in advance. Cook and eat at home most of the time. Eat slowly. Do not skip meals. Skipping meals can lead to overeating later in the day. This can make it harder for you to lose weight. Talk with a dietitian to help you make a meal plan and schedule that is right for you.    Eat fruits and vegetables at every meal.  They are low in calories and high in fiber, which makes you feel full. Do not add butter, margarine, or cream sauce to vegetables. Use herbs to season steamed vegetables.    Eat less fat and fewer fried foods.  Eat more baked or grilled chicken and fish. These protein sources are lower in calories and fat than red meat. Limit fast food. Dress your salads with olive oil and vinegar instead of bottled dressing.    Limit the amount of sugar you eat.  Do not drink sugary beverages. Limit alcohol.       Activity changes:  Physical activity is good for your body in many ways. It helps you burn calories and build strong muscles. It decreases stress and depression, and improves your mood. It can also help you sleep better. Talk to your healthcare provider before you begin an exercise program.  Exercise for at least 30 minutes 5 days a week.  Start slowly. Set aside time each day for physical activity that you enjoy and that is convenient for you. It is best to do both weight training and an activity that increases your heart rate, such as walking, bicycling, or swimming.            Find ways to be more active.  Do yard work and housecleaning. Walk up the stairs instead of using elevators. Spend your leisure time going to events that require walking, such as outdoor festivals or fairs. This extra physical activity can help you lose weight and keep it off.       Follow up with your doctor as directed:  You may need to meet with a dietitian. Write down your questions so you remember to ask them during your visits.  ©  Copyright [employer] 2023 Information is for End User's use only and may not be sold, redistributed or otherwise used for commercial purposes.  The above information is an  only. It is not intended as medical advice for individual conditions or treatments. Talk to your doctor, nurse or pharmacist before following any medical regimen to see if it is safe and effective for you.

## 2024-06-12 NOTE — PROGRESS NOTES
Adult Annual Physical  Name: Sharyn Oakes      : 1970      MRN: [id number]  Encounter Provider: Stuart Benson MD  Encounter Date: 2024   Encounter department: [facility]    Assessment & Plan   1. Annual physical exam  -     Lipid panel; Future; Expected date: 2024  -     CBC and differential; Future; Expected date: 2024  -     Comprehensive metabolic panel; Future; Expected date: 2024  2. Acute right-sided low back pain with sciatica, sciatica laterality unspecified  -     Ambulatory Referral to Physical Therapy; Future  -     XR spine lumbar minimum 4 views non injury; Future; Expected date: 2024  3. Essential hypertension  -     Comprehensive metabolic panel; Future; Expected date: 2024  -     TSH, 3rd generation with Free T4 reflex; Future; Expected date: 2024  -     UA w Reflex to Microscopic w Reflex to Culture; Future  4. Nodular thyroid disease  -     TSH, 3rd generation with Free T4 reflex; Future; Expected date: 2024  5. Vitamin D deficiency  -     Vitamin D 25 hydroxy; Future; Expected date: 2024  6. Screening for diabetes mellitus  -     Hemoglobin A1C; Future; Expected date: 2024  7. Class 1 obesity due to excess calories without serious comorbidity with body mass index (BMI) of 34.0 to 34.9 in adult  8. Encounter for screening mammogram for breast cancer  -     Mammo screening bilateral w 3d & cad; Future  9. Screening for cervical cancer  -     Ambulatory referral to Obstetrics / Gynecology; Future  10. Screening for HIV (human immunodeficiency virus)  -     HIV 1/2 AB/AG w Reflex [facility] for 2 yr old and above; Future; Expected date: 2024  11. Need for vaccination  -     Zoster Vac Recomb Adjuvanted (Shingrix) 50 MCG/0.5ML SUSR; Inject 0.5 mL into a muscle once for 1 dose Repeat dose in 2 to 6 months        Regarding her annual physical patient is given age and diagnosis appropriate evaluation and  care.  Patient is ordered the above blood work and urine.  Patient will be screened for diabetes with his next blood work also.  Patient also agreed to be screened for HIV with her next blood work.  Patient sent to the GYN for her Pap smear, sent for her mammogram as well.  Regarding her acute back pain, discussed with patient.  Patient denies any neuro red flags.  Patient will get an x-ray of the lumbar spine and is also sent for physical therapy.  Patient can take Tylenol and or NSAIDs as needed.  Also advised patient to lose weight.  Ice her back and also stretch her back as tolerated.  Regarding her hypertension patient BP is good.  Patient will continue her losartan.  Will decrease salt condiments in her diet.  Hydrate well.  Lose weight with better diet and exercise.  Will check her labs also.  Regarding her nodular thyroid patient will get a sonogram of the thyroid.  Patient also get TFTs and her blood work.  Discussed with patient as well.  Regarding her vitamin D deficiency patient is supplementing.  Will check her labs also.  Regarding her obesity patient advised to lose weight with better diet and exercise.  Will check her labs.  RTO 6 weeks and do the blood work and urine before.              Immunizations and preventive care screenings were discussed with patient today. Appropriate education was printed on patient's after visit summary.    Counseling:  Alcohol/drug use: discussed moderation in alcohol intake, the recommendations for healthy alcohol use, and avoidance of illicit drug use.  Dental Health: discussed importance of regular tooth brushing, flossing, and dental visits.  Injury prevention: discussed safety/seat belts, safety helmets, smoke detectors, carbon dioxide detectors, and smoking near bedding or upholstery.  Sexual health: discussed sexually transmitted diseases, partner selection, use of condoms, avoidance of unintended pregnancy, and contraceptive alternatives.  Exercise: the  importance of regular exercise/physical activity was discussed. Recommend exercise 3-5 times per week for at least 30 minutes.          History of Present Illness     Adult Annual Physical:  Patient presents for annual physical. 53-year-old female here for her annual physical.  Patient also would like to be evaluated for her low back pain.  No history of trauma.  No neuro red flags.  Pain 4-5 out of 10.  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  Patient denies tobacco.  Drinks very minimal alcohol socially.  Denies drugs.  Is up-to-date on her colonoscopy attention to the care of another 1 in 2030.  Patient has no complaints regarding her GI.  Patient does need a mammogram as well past Pap smear.  Patient's last bone density was 2 years ago which was normal.  Patient also needs a shingles vaccine..     Diet and Physical Activity:  - Diet/Nutrition: well balanced diet.  - Exercise: no formal exercise and walking.    Depression Screening:  - PHQ-2 Score: 0    General Health:  - Sleep: sleeps well.  - Hearing: normal hearing bilateral ears.  - Vision: no vision problems.  - Dental: no dental visits for > 1 year.    /GYN Health:  - Follows with GYN: yes.   - Menopause: postmenopausal.   - History of STDs: no    Advanced Care Planning:  - Has an advanced directive?: no    - Has a durable medical POA?: no    - ACP document given to patient?: no      Review of Systems   Constitutional:  Negative for activity change, appetite change, chills, fatigue, fever and unexpected weight change.   HENT:  Negative for congestion, hearing loss and sore throat.    Eyes:  Negative for visual disturbance.   Respiratory:  Negative for cough and shortness of breath.    Cardiovascular:  Negative for chest pain and palpitations.   Gastrointestinal:  Negative for abdominal pain, blood in stool, constipation, diarrhea, nausea and vomiting.   Genitourinary:  Negative for dysuria and hematuria.   Musculoskeletal:  Positive for back pain.   Skin:   "Negative for rash.   Neurological:  Negative for dizziness and headaches.   Psychiatric/Behavioral:  Negative for dysphoric mood and sleep disturbance. The patient is not nervous/anxious.          Objective     /66 (BP Location: Left arm, Patient Position: Sitting, Cuff Size: Large)   Pulse 84   Temp (!) 97.2 °F (36.2 °C) (Temporal)   Resp 16   Ht 5' 1\" (1.549 m)   Wt 83.5 kg (184 lb)   SpO2 97%   BMI 34.77 kg/m²     Physical Exam  Vitals reviewed.   Constitutional:       General: She is not in acute distress.     Appearance: Normal appearance. She is obese. She is not ill-appearing.   HENT:      Head: Normocephalic and atraumatic.      Right Ear: Tympanic membrane, ear canal and external ear normal.      Left Ear: Tympanic membrane, ear canal and external ear normal.      Mouth/Throat:      Mouth: Mucous membranes are moist.      Pharynx: Oropharynx is clear.   Eyes:      Extraocular Movements: Extraocular movements intact.      Conjunctiva/sclera: Conjunctivae normal.   Neck:      Vascular: No carotid bruit.      Comments: Thyroid nodule on exam.  Cardiovascular:      Rate and Rhythm: Normal rate and regular rhythm.   Pulmonary:      Effort: Pulmonary effort is normal.      Breath sounds: Normal breath sounds.   Abdominal:      General: Bowel sounds are normal.      Palpations: Abdomen is soft.      Tenderness: There is no abdominal tenderness. There is no right CVA tenderness or left CVA tenderness.   Musculoskeletal:         General: No tenderness.      Right lower leg: No edema.      Left lower leg: No edema.      Comments: No calf tenderness bilateral.   Lymphadenopathy:      Cervical: No cervical adenopathy.   Skin:     General: Skin is warm.      Findings: No rash.   Neurological:      General: No focal deficit present.      Mental Status: She is alert and oriented to person, place, and time.   Psychiatric:         Mood and Affect: Mood normal.         Behavior: Behavior normal.         Thought " Content: Thought content normal.       Administrative Statements

## 2024-06-19 ENCOUNTER — APPOINTMENT (OUTPATIENT)
Dept: LAB | Facility: MEDICAL CENTER | Age: 54
End: 2024-06-19
Payer: COMMERCIAL

## 2024-06-19 DIAGNOSIS — Z11.4 SCREENING FOR HIV (HUMAN IMMUNODEFICIENCY VIRUS): ICD-10-CM

## 2024-06-19 DIAGNOSIS — Z00.00 ANNUAL PHYSICAL EXAM: ICD-10-CM

## 2024-06-19 DIAGNOSIS — E55.9 VITAMIN D DEFICIENCY: ICD-10-CM

## 2024-06-19 DIAGNOSIS — I10 ESSENTIAL HYPERTENSION: ICD-10-CM

## 2024-06-19 DIAGNOSIS — Z13.1 SCREENING FOR DIABETES MELLITUS: ICD-10-CM

## 2024-06-19 DIAGNOSIS — E04.1 NODULAR THYROID DISEASE: ICD-10-CM

## 2024-06-19 LAB
25(OH)D3 SERPL-MCNC: 49.2 NG/ML (ref 30–100)
ALBUMIN SERPL BCG-MCNC: 4.3 G/DL (ref 3.5–5)
ALP SERPL-CCNC: 96 U/L (ref 34–104)
ALT SERPL W P-5'-P-CCNC: 16 U/L (ref 7–52)
ANION GAP SERPL CALCULATED.3IONS-SCNC: 9 MMOL/L (ref 4–13)
AST SERPL W P-5'-P-CCNC: 18 U/L (ref 13–39)
BACTERIA UR QL AUTO: ABNORMAL /HPF
BASOPHILS # BLD AUTO: 0.05 THOUSANDS/ÂΜL (ref 0–0.1)
BASOPHILS NFR BLD AUTO: 1 % (ref 0–1)
BILIRUB SERPL-MCNC: 0.33 MG/DL (ref 0.2–1)
BILIRUB UR QL STRIP: NEGATIVE
BUN SERPL-MCNC: 19 MG/DL (ref 5–25)
CALCIUM SERPL-MCNC: 9.5 MG/DL (ref 8.4–10.2)
CHLORIDE SERPL-SCNC: 105 MMOL/L (ref 96–108)
CHOLEST SERPL-MCNC: 183 MG/DL
CLARITY UR: ABNORMAL
CO2 SERPL-SCNC: 28 MMOL/L (ref 21–32)
COLOR UR: YELLOW
CREAT SERPL-MCNC: 0.59 MG/DL (ref 0.6–1.3)
EOSINOPHIL # BLD AUTO: 0.11 THOUSAND/ÂΜL (ref 0–0.61)
EOSINOPHIL NFR BLD AUTO: 1 % (ref 0–6)
ERYTHROCYTE [DISTWIDTH] IN BLOOD BY AUTOMATED COUNT: 13.2 % (ref 11.6–15.1)
EST. AVERAGE GLUCOSE BLD GHB EST-MCNC: 108 MG/DL
GFR SERPL CREATININE-BSD FRML MDRD: 104 ML/MIN/1.73SQ M
GLUCOSE P FAST SERPL-MCNC: 81 MG/DL (ref 65–99)
GLUCOSE UR STRIP-MCNC: NEGATIVE MG/DL
HBA1C MFR BLD: 5.4 %
HCT VFR BLD AUTO: 47.1 % (ref 34.8–46.1)
HDLC SERPL-MCNC: 48 MG/DL
HGB BLD-MCNC: 15.2 G/DL (ref 11.5–15.4)
HGB UR QL STRIP.AUTO: NEGATIVE
HIV 1+2 AB+HIV1 P24 AG SERPL QL IA: NORMAL
HIV 2 AB SERPL QL IA: NORMAL
HIV1 AB SERPL QL IA: NORMAL
HIV1 P24 AG SERPL QL IA: NORMAL
IMM GRANULOCYTES # BLD AUTO: 0.03 THOUSAND/UL (ref 0–0.2)
IMM GRANULOCYTES NFR BLD AUTO: 0 % (ref 0–2)
KETONES UR STRIP-MCNC: NEGATIVE MG/DL
LDLC SERPL CALC-MCNC: 117 MG/DL (ref 0–100)
LEUKOCYTE ESTERASE UR QL STRIP: ABNORMAL
LYMPHOCYTES # BLD AUTO: 2.34 THOUSANDS/ÂΜL (ref 0.6–4.47)
LYMPHOCYTES NFR BLD AUTO: 28 % (ref 14–44)
MCH RBC QN AUTO: 29.3 PG (ref 26.8–34.3)
MCHC RBC AUTO-ENTMCNC: 32.3 G/DL (ref 31.4–37.4)
MCV RBC AUTO: 91 FL (ref 82–98)
MONOCYTES # BLD AUTO: 0.49 THOUSAND/ÂΜL (ref 0.17–1.22)
MONOCYTES NFR BLD AUTO: 6 % (ref 4–12)
MUCOUS THREADS UR QL AUTO: ABNORMAL
NEUTROPHILS # BLD AUTO: 5.48 THOUSANDS/ÂΜL (ref 1.85–7.62)
NEUTS SEG NFR BLD AUTO: 64 % (ref 43–75)
NITRITE UR QL STRIP: NEGATIVE
NON-SQ EPI CELLS URNS QL MICRO: ABNORMAL /HPF
NONHDLC SERPL-MCNC: 135 MG/DL
NRBC BLD AUTO-RTO: 0 /100 WBCS
PH UR STRIP.AUTO: 6 [PH]
PLATELET # BLD AUTO: 277 THOUSANDS/UL (ref 149–390)
PMV BLD AUTO: 10.3 FL (ref 8.9–12.7)
POTASSIUM SERPL-SCNC: 4.1 MMOL/L (ref 3.5–5.3)
PROT SERPL-MCNC: 7.5 G/DL (ref 6.4–8.4)
PROT UR STRIP-MCNC: ABNORMAL MG/DL
RBC # BLD AUTO: 5.18 MILLION/UL (ref 3.81–5.12)
RBC #/AREA URNS AUTO: ABNORMAL /HPF
SODIUM SERPL-SCNC: 142 MMOL/L (ref 135–147)
SP GR UR STRIP.AUTO: 1.03 (ref 1–1.03)
TRIGL SERPL-MCNC: 92 MG/DL
TSH SERPL DL<=0.05 MIU/L-ACNC: 0.78 UIU/ML (ref 0.45–4.5)
UROBILINOGEN UR STRIP-ACNC: 2 MG/DL
WBC # BLD AUTO: 8.5 THOUSAND/UL (ref 4.31–10.16)
WBC #/AREA URNS AUTO: ABNORMAL /HPF

## 2024-06-19 PROCEDURE — 85025 COMPLETE CBC W/AUTO DIFF WBC: CPT

## 2024-06-19 PROCEDURE — 80061 LIPID PANEL: CPT

## 2024-06-19 PROCEDURE — 84443 ASSAY THYROID STIM HORMONE: CPT

## 2024-06-19 PROCEDURE — 87389 HIV-1 AG W/HIV-1&-2 AB AG IA: CPT

## 2024-06-19 PROCEDURE — 36415 COLL VENOUS BLD VENIPUNCTURE: CPT

## 2024-06-19 PROCEDURE — 80053 COMPREHEN METABOLIC PANEL: CPT

## 2024-06-19 PROCEDURE — 83036 HEMOGLOBIN GLYCOSYLATED A1C: CPT

## 2024-06-19 PROCEDURE — 81001 URINALYSIS AUTO W/SCOPE: CPT

## 2024-06-19 PROCEDURE — 82306 VITAMIN D 25 HYDROXY: CPT

## 2024-06-26 ENCOUNTER — EVALUATION (OUTPATIENT)
Dept: PHYSICAL THERAPY | Facility: REHABILITATION | Age: 54
End: 2024-06-26
Payer: COMMERCIAL

## 2024-06-26 DIAGNOSIS — M54.40 ACUTE RIGHT-SIDED LOW BACK PAIN WITH SCIATICA, SCIATICA LATERALITY UNSPECIFIED: Primary | ICD-10-CM

## 2024-06-26 PROCEDURE — 97161 PT EVAL LOW COMPLEX 20 MIN: CPT | Performed by: PHYSICAL THERAPIST

## 2024-06-26 PROCEDURE — 97110 THERAPEUTIC EXERCISES: CPT | Performed by: PHYSICAL THERAPIST

## 2024-06-26 NOTE — PROGRESS NOTES
PT Evaluation     Today's date: 2024  Patient name: Sharyn Oakes  : 1970  MRN: [id number]  Referring provider: Stuart Benson*  Dx:   Encounter Diagnosis     ICD-10-CM    1. Acute right-sided low back pain with sciatica, sciatica laterality unspecified  M54.40 Ambulatory Referral to Physical Therapy          Start Time: 1515  Stop Time: 1600  Total time in clinic (min): 45 minutes    Assessment  Impairments: abnormal or restricted ROM, abnormal movement, activity intolerance, lacks appropriate home exercise program and pain with function  Symptom irritability: high    Assessment details: Sharyn Oakes is a 53 y.o. female that presents to outpatient physical therapy with complaints of acute on chronic low back pain resulting in significant activity limitations. Pt demonstrates decreased lumbar ROM with extension most limited, decreased b/l hip flexion ROM, decreased proximal hip strength, (-) SIJ testing including compression/distraction testing and sacral spring test leading to gait dysfunction. Pt at this time would be an excellent candidate for skilled PT in order to attain set goals and optimize overall function. Thank you for this referral.  Understanding of Dx/Px/POC: good     Prognosis: fair    Goals  Short-term: 4 weeks  1. Pt will be independent with initial HEP   2. Pt will improve lumbar ROM by 25%   3. Pt will improve b/l LE MMT by 1/2 grade  4. Pt will be able to sit for 10 minutes with pain at worst of 5/10       Long-term: 8 weeks  1. Pt will score equal or better than projected score on FOTO to show improvement in pain and function.  2. Pt will be able to ambulate 30 minutes with pain at worst of 3/10  3. Pt will improve lumbar ROM to WFL by discharge  4. Pt will improve b/l LE MMT to 4+/5 by discharge    Plan  Patient would benefit from: PT eval and skilled physical therapy    Frequency: 1x week  Duration in weeks: 8  Treatment plan discussed with: patient  Plan details: Agreeable  to 1x/week due to work schedule        Subjective Evaluation    History of Present Illness  Mechanism of injury: Sharyn Oakes is a 53 y.o. female that presents to OPPT stating that she's been experiencing acute on chronic low back pain with referral of symptoms into b/l glutes. Pt reports that symptoms are worse with sitting and she reports that it may be due to prolonged positioning. Pt reports that she is currently working but is on restrictions due to recent R foot surgery. Current restrictions include, no lifting, no stair negotiation or decreased ambulation. Pt currently has pain with lifting, pushing/pulling, prolonged ambulating and sitting/standing. Pt does report N/T into glute regions however denies further referral of pain. Pt denies B/B changes, saddle anesthesia, history of cancer, night pain or night sweats.   Patient Goals  Patient goals for therapy: decreased pain, increased motion, increased strength, independence with ADLs/IADLs and return to work    Pain  Current pain ratin  At worst pain rating: 10  Location: lumbar spine      Diagnostic Tests  Abnormal x-ray: ordered, not completed.  Treatments  Previous treatment: physical therapy        Objective     Static Posture   General Observations  Shifted right and guarded.     Tenderness     Left Hip   Tenderness in the sacroiliac joint.     Right Hip   Tenderness in the sacroiliac joint.     Additional Tenderness Details  (+) sacral testing     Neurological Testing     Sensation     Lumbar   Left   Intact: light touch    Right   Intact: light touch    Hip   Left Hip   Intact: light touch    Right Hip   Intact: light touch    Reflexes   Left   Patellar (L4): normal (2+)  Achilles (S1): normal (2+)    Right   Patellar (L4): normal (2+)  Achilles (S1): normal (2+)    Active Range of Motion     Lumbar   Flexion:  WFL and with pain  Extension:  with pain Restriction level: moderate  Left lateral flexion:  WFL and with pain  Right lateral flexion:  WFL  and with pain  Left rotation:  WFL and with pain  Right rotation:  WFL and with pain    Passive Range of Motion   Left Hip   Flexion: 100 degrees   External rotation (90/90): WFL  Internal rotation (90/90): WFL    Right Hip   Flexion: 90 degrees   External rotation (90/90): WFL  Internal rotation (90/90): WFL    Additional Passive Range of Motion Details  Knee pain noted with hip PROM    Joint Play     Pain: L1, L2, L3, L4, L5 and S1     Strength/Myotome Testing     Left Hip   Planes of Motion   Flexion: 4+  Extension: 3+  Abduction: 4  External rotation: 5  Internal rotation: 5    Right Hip   Planes of Motion   Flexion: 5  Extension: 5  Abduction: 5  External rotation: 5  Internal rotation: 5    Tests     Lumbar   Positive SIJ compression, sacroiliac distraction  and sacral spring .     Left   Positive quadrant.   Negative crossed SLR, passive SLR and slump test.     Right   Positive quadrant.   Negative crossed SLR, passive SLR and slump test.     Left Pelvic Girdle/Sacrum   Negative: active SLR test.     Right Pelvic Girdle/Sacrum   Negative: active SLR test.     Left Hip   Positive SI compression and SI distraction.   Negative SKYLAR, FADIR and scour.   90/90 SLR: Negative.   SLR: Negative.     Right Hip   Positive SI compression and SI distraction.   Negative SKYLAR, FADIR and scour.   90/90 SLR: Negative.  SLR: Negative.     Ambulation     Observational Gait   Gait: antalgic   Left arm swing: decreased    General Comments:      Lumbar Comments  Unable to assess joint play this session due to pain  Graphical documentation:                  Precautions:  Patient Active Problem List   Diagnosis    Tricompartment osteoarthritis of right knee    Annual physical exam    Chronic pain of left knee    Complex tear of medial meniscus of left knee as current injury    Osteoarthritis of left knee    Patellofemoral disorder of left knee    HPV in female    Essential hypertension    Class 1 obesity due to excess calories  "without serious comorbidity with body mass index (BMI) of 34.0 to 34.9 in adult    Post-op pain       Patient's Goals:     6/26            FOTO             Eval/Re-eval IE                         Education             HEP/POC ELISSA            Manuals             B/l hip PROM                                       Ther Ex             HS stretch Seated 30\"x3            LTR 5\"x10            Bridges 5\"x10            SLR--flex             TvA             TvA marching             TvA BKFO             Ball crushes             Hip abd/add                          Neuro Re-ed             Ther Activity             Bike 5' nv                         Gait Training                                       Modalities             HP prn                                  "

## 2024-06-27 ENCOUNTER — APPOINTMENT (OUTPATIENT)
Dept: RADIOLOGY | Age: 54
End: 2024-06-27
Payer: COMMERCIAL

## 2024-06-27 DIAGNOSIS — M54.40 ACUTE RIGHT-SIDED LOW BACK PAIN WITH SCIATICA, SCIATICA LATERALITY UNSPECIFIED: ICD-10-CM

## 2024-06-27 PROCEDURE — 72110 X-RAY EXAM L-2 SPINE 4/>VWS: CPT

## 2024-07-03 ENCOUNTER — OFFICE VISIT (OUTPATIENT)
Dept: PHYSICAL THERAPY | Facility: REHABILITATION | Age: 54
End: 2024-07-03
Payer: COMMERCIAL

## 2024-07-03 DIAGNOSIS — M54.40 ACUTE RIGHT-SIDED LOW BACK PAIN WITH SCIATICA, SCIATICA LATERALITY UNSPECIFIED: Primary | ICD-10-CM

## 2024-07-03 PROCEDURE — 97140 MANUAL THERAPY 1/> REGIONS: CPT | Performed by: PHYSICAL THERAPIST

## 2024-07-03 PROCEDURE — 97110 THERAPEUTIC EXERCISES: CPT | Performed by: PHYSICAL THERAPIST

## 2024-07-03 NOTE — PROGRESS NOTES
Daily Note     Today's date: 7/3/2024  Patient name: Sharyn Oakes  : 1970  MRN: [id number]  Referring provider: Stuart Benson*  Dx:   Encounter Diagnosis     ICD-10-CM    1. Acute right-sided low back pain with sciatica, sciatica laterality unspecified  M54.40           Start Time: 1510  Stop Time: 1550  Total time in clinic (min): 40 minutes    Subjective: Pt reports that she received results of x-ray for lumbar spine. She reports constant pain and does not feel relief of symptoms.     Objective: See treatment diary below  X-ray: Slight rightward thoracolumbar curvature. Mild to moderate anterior spondylolisthesis of L4 on L5.   Multilevel degenerative facet hypertrophy in the mid to lower lumbar spine with degenerative disc space narrowing at L4-L5 and L5-S1. Degenerative disc space narrowing at L1-L2.      Assessment: Pt tolerated treatment well this visit. Pt demonstrated good challenge with provided exercises and interventions. Pt did note an increase in pain with exercises however post-session reported reduction in pain. Pt educated on increasing ambulation distance within restrictions in order to reduce nervous system response and improve overall function. Pt would continue to benefit from skilled PT in order to address remaining deficits.       Plan: Continue per plan of care.      Precautions:  Patient Active Problem List   Diagnosis    Tricompartment osteoarthritis of right knee    Annual physical exam    Chronic pain of left knee    Complex tear of medial meniscus of left knee as current injury    Osteoarthritis of left knee    Patellofemoral disorder of left knee    HPV in female    Essential hypertension    Class 1 obesity due to excess calories without serious comorbidity with body mass index (BMI) of 34.0 to 34.9 in adult    Post-op pain       Patient's Goals:     6/26 7/3           FOTO             Eval/Re-eval IE                         Education             HEP/POC ELISSA           "  Manuals             B/l hip PROM                                       Ther Ex             HS stretch Seated 30\"x3 Seated 10\"x5B           LTR 5\"x10 10\"x10           Bridges 5\"x10            PPT  5\" x10                        SLR--flex             TvA  5\"x10           TvA marching  5\" 2x10           TvA BKFO             Ball crushes  Supine 5\" x10           Hip abd/add  PTB  5\" 2x10           3-way forward flexion  Physioball flex 5\"x10ea           Neuro Re-ed             Ther Activity             Bike 5' nv 5' w. P                        Gait Training                                       Modalities             HP prn                                  "

## 2024-07-05 DIAGNOSIS — M17.12 PRIMARY OSTEOARTHRITIS OF LEFT KNEE: ICD-10-CM

## 2024-07-05 RX ORDER — NAPROXEN 500 MG/1
500 TABLET ORAL 2 TIMES DAILY WITH MEALS
Qty: 60 TABLET | Refills: 5 | Status: SHIPPED | OUTPATIENT
Start: 2024-07-05

## 2024-07-10 ENCOUNTER — OFFICE VISIT (OUTPATIENT)
Dept: PHYSICAL THERAPY | Facility: REHABILITATION | Age: 54
End: 2024-07-10
Payer: COMMERCIAL

## 2024-07-10 DIAGNOSIS — M54.40 ACUTE RIGHT-SIDED LOW BACK PAIN WITH SCIATICA, SCIATICA LATERALITY UNSPECIFIED: Primary | ICD-10-CM

## 2024-07-10 PROCEDURE — 97112 NEUROMUSCULAR REEDUCATION: CPT | Performed by: PHYSICAL THERAPIST

## 2024-07-10 PROCEDURE — 97530 THERAPEUTIC ACTIVITIES: CPT | Performed by: PHYSICAL THERAPIST

## 2024-07-10 PROCEDURE — 97110 THERAPEUTIC EXERCISES: CPT | Performed by: PHYSICAL THERAPIST

## 2024-07-10 NOTE — PROGRESS NOTES
"Daily Note     Today's date: 7/10/2024  Patient name: Sharyn Oakes  : 1970  MRN: [id number]  Referring provider: Stuart Benson*  Dx:   Encounter Diagnosis     ICD-10-CM    1. Acute right-sided low back pain with sciatica, sciatica laterality unspecified  M54.40           Start Time: 1115  Stop Time: 1155  Total time in clinic (min): 40 minutes    Subjective: Pt reports that she had improvement in pain following last session. Overall improvement in pain symptoms reported.       Objective: See treatment diary below      Assessment: Pt tolerated treatment well this visit. Pt able to complete all exercises and interventions as documented below without exacerbation of pain. Minimal cues required for appropriate technique and demonstrated good carryover. Appropriate fatigue noted post-session. Pt would continue to benefit from skilled PT to address deficits and optimize overall function.       Plan: Continue per plan of care.      Precautions:  Patient Active Problem List   Diagnosis    Tricompartment osteoarthritis of right knee    Annual physical exam    Chronic pain of left knee    Complex tear of medial meniscus of left knee as current injury    Osteoarthritis of left knee    Patellofemoral disorder of left knee    HPV in female    Essential hypertension    Class 1 obesity due to excess calories without serious comorbidity with body mass index (BMI) of 34.0 to 34.9 in adult    Post-op pain       Patient's Goals:     6/26 7/3 7/10          FOTO             Eval/Re-eval IE                         Education             HEP/POC ELISSA            Manuals             B/l hip PROM                                       Ther Ex             HS stretch Seated 30\"x3 Seated 10\"x5B           SKTC   10\"X10          LTR 5\"x10 10\"x10 10\"x10          Bridges 5\"x10  5\"x10          PPT  5\" x10 5\"x10                       SLR--flex             TvA  5\"x10 5\"x10          TvA marching  5\" 2x10 5\" 2x10          TvA BKFO           " "  Ball crushes  Supine 5\" x10 Supine 5\" x10          Hip abd/add  PTB  5\" 2x10 PTB  5\" 2x10          3-way forward flexion  Physioball flex 5\"x10ea np          Neuro Re-ed             Sciatic nerve glide   X10 B          Pallof press   BTB 5\" 2x10          Ther Activity             Bike 5' nv 5' w. MHP 5' w/ MHP          STS   5# KB hi-lo x15          Leg press   44# x10          Gait Training                                       Modalities             HP prn  W/ bike                                  "

## 2024-07-15 NOTE — TELEPHONE ENCOUNTER
*
Called in
Can we find out what is available so we dont end up sending over multiple rx's
Cancelled RX at [facility] and resent to [facility] on file 
Diclofenac Sodium (Voltaren) is available 
Jose Antonio from [employer] medication isn't available need alternative please advise thanks   Cb# [phone]
Patient sees Dr Adri Jose  She was in the office yesterday and she was told that a medication for her pain would be sent over to the [facility] on file  She states that nothing was sent over yet  Can this be done for her?
Sent
Try to leave patient but voicemail is full 
No

## 2024-07-17 ENCOUNTER — OFFICE VISIT (OUTPATIENT)
Dept: PHYSICAL THERAPY | Facility: REHABILITATION | Age: 54
End: 2024-07-17
Payer: COMMERCIAL

## 2024-07-17 DIAGNOSIS — M54.40 ACUTE RIGHT-SIDED LOW BACK PAIN WITH SCIATICA, SCIATICA LATERALITY UNSPECIFIED: Primary | ICD-10-CM

## 2024-07-17 PROCEDURE — 97112 NEUROMUSCULAR REEDUCATION: CPT

## 2024-07-17 PROCEDURE — 97530 THERAPEUTIC ACTIVITIES: CPT

## 2024-07-17 PROCEDURE — 97110 THERAPEUTIC EXERCISES: CPT

## 2024-07-17 NOTE — PROGRESS NOTES
"Daily Note     Today's date: 2024  Patient name: Sharyn Oakes  : 1970  MRN: [id number]  Referring provider: Stuart Benson*  Dx:   Encounter Diagnosis     ICD-10-CM    1. Acute right-sided low back pain with sciatica, sciatica laterality unspecified  M54.40                      Subjective: Patient has some noted pain this morning, but doing okay and starting to get better.       Objective: See treatment diary below      Assessment: Tolerated treatment well.  Improvements with core stability and overall decreased pain noted with patient. HEP was reviewed and patients shows compliance to it. Mild discomfort upon finishing session today. Patient would benefit from continued PT      Plan: Continue per plan of care.      Precautions:  Patient Active Problem List   Diagnosis    Tricompartment osteoarthritis of right knee    Annual physical exam    Chronic pain of left knee    Complex tear of medial meniscus of left knee as current injury    Osteoarthritis of left knee    Patellofemoral disorder of left knee    HPV in female    Essential hypertension    Class 1 obesity due to excess calories without serious comorbidity with body mass index (BMI) of 34.0 to 34.9 in adult    Post-op pain       Patient's Goals:      7/3 7/10 7/17         FOTO             Eval/Re-eval IE                         Education             HEP/POC ELISSA            Manuals             B/l hip PROM                                       Ther Ex             HS stretch Seated 30\"x3 Seated 10\"x5B           SKTC   10\"X10 10\" x 10          LTR 5\"x10 10\"x10 10\"x10 10\" x 10          Bridges 5\"x10  5\"x10 5\" 2 x 10          PPT  5\" x10 5\"x10 5\" x 10                      SLR--flex             TvA  5\"x10 5\"x10 5\" x 10          TvA marching  5\" 2x10 5\" 2x10 5\"  2 x 10          TvA BKFO             Ball crushes  Supine 5\" x10 Supine 5\" x10 Supine 5\" x 10          Hip abd/add  PTB  5\" 2x10 PTB  5\" 2x10 PTB  5\" 2 x 10          3-way forward " "flexion  Physioball flex 5\"x10ea np          Neuro Re-ed             Sciatic nerve glide   X10 B X 10 B         Pallof press   BTB 5\" 2x10 BTB   5\" 2 x 10          Ther Activity             Bike 5' nv 5' w. MHP 5' w/ MHP 5' w MHP         STS   5# KB hi-lo x15 5#  KB  Hi-lo         Leg press   44# x10 44# x 10         Gait Training                                       Modalities             HP prn  W/ bike W/ bike                                    "

## 2024-07-24 ENCOUNTER — OFFICE VISIT (OUTPATIENT)
Dept: FAMILY MEDICINE CLINIC | Facility: CLINIC | Age: 54
End: 2024-07-24
Payer: COMMERCIAL

## 2024-07-24 ENCOUNTER — APPOINTMENT (OUTPATIENT)
Dept: PHYSICAL THERAPY | Facility: REHABILITATION | Age: 54
End: 2024-07-24
Payer: COMMERCIAL

## 2024-07-24 VITALS
BODY MASS INDEX: 33.72 KG/M2 | HEART RATE: 89 BPM | WEIGHT: 178.6 LBS | TEMPERATURE: 98.2 F | OXYGEN SATURATION: 99 % | SYSTOLIC BLOOD PRESSURE: 134 MMHG | DIASTOLIC BLOOD PRESSURE: 65 MMHG | HEIGHT: 61 IN | RESPIRATION RATE: 16 BRPM

## 2024-07-24 DIAGNOSIS — M54.40 ACUTE RIGHT-SIDED LOW BACK PAIN WITH SCIATICA, SCIATICA LATERALITY UNSPECIFIED: ICD-10-CM

## 2024-07-24 DIAGNOSIS — E66.09 CLASS 1 OBESITY DUE TO EXCESS CALORIES WITHOUT SERIOUS COMORBIDITY WITH BODY MASS INDEX (BMI) OF 34.0 TO 34.9 IN ADULT: ICD-10-CM

## 2024-07-24 DIAGNOSIS — M25.561 PAIN IN BOTH KNEES, UNSPECIFIED CHRONICITY: ICD-10-CM

## 2024-07-24 DIAGNOSIS — I10 ESSENTIAL HYPERTENSION: Primary | ICD-10-CM

## 2024-07-24 DIAGNOSIS — E78.00 PURE HYPERCHOLESTEROLEMIA: ICD-10-CM

## 2024-07-24 DIAGNOSIS — M25.562 PAIN IN BOTH KNEES, UNSPECIFIED CHRONICITY: ICD-10-CM

## 2024-07-24 PROCEDURE — 99214 OFFICE O/P EST MOD 30 MIN: CPT | Performed by: FAMILY MEDICINE

## 2024-07-24 RX ORDER — LIFITEGRAST 50 MG/ML
SOLUTION/ DROPS OPHTHALMIC
COMMUNITY
Start: 2024-07-22

## 2024-07-24 NOTE — PROGRESS NOTES
Ambulatory Visit  Name: Sharyn Oakes      : 1970      MRN: [id number]  Encounter Provider: Stuart Benson MD  Encounter Date: 2024   Encounter department: [facility]    Assessment & Plan   1. Essential hypertension  -     CBC and differential; Future  -     Comprehensive metabolic panel; Future  -     TSH, 3rd generation with Free T4 reflex; Future  -     Hemoglobin A1C; Future  -     UA w Reflex to Microscopic w Reflex to Culture; Future; Expected date: 2024  2. Pure hypercholesterolemia  -     Comprehensive metabolic panel; Future  -     Hemoglobin A1C; Future  -     Lipid panel; Future  3. Class 1 obesity due to excess calories without serious comorbidity with body mass index (BMI) of 34.0 to 34.9 in adult  -     Hemoglobin A1C; Future  4. Acute right-sided low back pain with sciatica, sciatica laterality unspecified  -     Ambulatory Referral to Orthopedic Surgery; Future  5. Pain in both knees, unspecified chronicity  -     Ambulatory Referral to Orthopedic Surgery; Future      Regarding hypertension's patient blood pressure is likely elevated in the last time secondary to her anxiety regarding her x-ray results of the lumbar spine and labs as per patient.  Patient denies any acute symptoms from it cardiovascularly or neurologically.  Patient advised to continue her meds.  Lose weight with a better diet and exercise.  Hydrate well.  Low-sodium diet.  Will continue to monitor her BP.  Regarding her hyperlipidemia patient's LFTs are normal.  Her total cholesterol went up to 183 from 170 from August of last year, triglycerides went up to 92 from 77, HDL went up to 48 from 43 and her LDL went up to 117 from 112.  Was advised to cut down on starches fats and sweets.  Lose weight with better diet and exercise.  I will recheck her again in 6 months.  Regarding her obesity, discussed patient again.  As tolerated.  Regarding her low back pain and with his history of  "bilateral knee pain, discussed with patient.  X-ray of her lumbar spine was as well.  Patient will continue with physical therapy.  And patient is referred to orthopedic as well.  Patient advised to lose weight.  Supportive care and patient education.  RTO 6 months and do the blood work and urine before.  Patient can see me prior to that for anything else in between.           History of Present Illness     53-year-old female here to be evaluated for her back pain is hypertension and lipids.  Patient is an x-ray of the lumbar spine as well as blood work and urine.  Patient has been going to physical therapy for her back pain.  Patient is also complaining of bilateral knee pain which is not new.  Patient denies tobacco.        Review of Systems   Constitutional:  Negative for chills, fatigue, fever and unexpected weight change.   HENT:  Negative for congestion and sore throat.    Eyes:  Negative for visual disturbance.   Respiratory:  Negative for cough and shortness of breath.    Cardiovascular:  Negative for chest pain and palpitations.   Gastrointestinal:  Negative for abdominal pain.   Genitourinary:  Negative for dysuria and hematuria.   Musculoskeletal:  Positive for arthralgias and back pain.   Skin:  Negative for rash.   Neurological:  Negative for dizziness and headaches.   Psychiatric/Behavioral:  Negative for dysphoric mood. The patient is not nervous/anxious.        Objective     /65 (BP Location: Left arm, Patient Position: Sitting, Cuff Size: Adult)   Pulse 89   Temp 98.2 °F (36.8 °C) (Temporal)   Resp 16   Ht 5' 1\" (1.549 m)   Wt 81 kg (178 lb 9.6 oz)   SpO2 99%   BMI 33.75 kg/m²     Physical Exam  Vitals reviewed.   Constitutional:       General: She is not in acute distress.     Appearance: Normal appearance. She is obese. She is not ill-appearing.   HENT:      Head: Normocephalic and atraumatic.      Mouth/Throat:      Mouth: Mucous membranes are moist.   Eyes:      Extraocular " Movements: Extraocular movements intact.      Conjunctiva/sclera: Conjunctivae normal.   Neck:      Vascular: No carotid bruit.   Cardiovascular:      Rate and Rhythm: Normal rate.   Pulmonary:      Effort: Pulmonary effort is normal.      Breath sounds: Normal breath sounds.   Musculoskeletal:         General: No tenderness.      Right lower leg: No edema.      Left lower leg: No edema.      Comments: No calf tenderness bilateral.   Skin:     General: Skin is warm.   Neurological:      General: No focal deficit present.      Mental Status: She is alert and oriented to person, place, and time.   Psychiatric:         Mood and Affect: Mood normal.         Behavior: Behavior normal.         Thought Content: Thought content normal.       Administrative Statements

## 2024-07-31 ENCOUNTER — OFFICE VISIT (OUTPATIENT)
Dept: PHYSICAL THERAPY | Facility: REHABILITATION | Age: 54
End: 2024-07-31
Payer: COMMERCIAL

## 2024-07-31 DIAGNOSIS — M54.40 ACUTE RIGHT-SIDED LOW BACK PAIN WITH SCIATICA, SCIATICA LATERALITY UNSPECIFIED: Primary | ICD-10-CM

## 2024-07-31 PROCEDURE — 97110 THERAPEUTIC EXERCISES: CPT

## 2024-07-31 PROCEDURE — 97112 NEUROMUSCULAR REEDUCATION: CPT

## 2024-07-31 PROCEDURE — 97530 THERAPEUTIC ACTIVITIES: CPT

## 2024-07-31 NOTE — PROGRESS NOTES
"Daily Note     Today's date: 2024  Patient name: Sharyn Oakes  : 1970  MRN: [id number]  Referring provider: Stuart Benson*  Dx:   Encounter Diagnosis     ICD-10-CM    1. Acute right-sided low back pain with sciatica, sciatica laterality unspecified  M54.40                      Subjective: Pt reports she is doing ok, she still has some pain but it has been better overall.       Objective: See treatment diary below      Assessment: Tolerated treatment well. Pt performed all exercises without issue, continue to progress to tolerance. Pt required occasional cueing for correct form with exercises. Pt would benefit from continued focus on stretching, strengthening, and core stability exercises to decrease pain and increase function. Patient demonstrated fatigue post treatment, exhibited good technique with therapeutic exercises, and would benefit from continued PT      Plan: Continue per plan of care.      Precautions:  Patient Active Problem List   Diagnosis    Tricompartment osteoarthritis of right knee    Annual physical exam    Chronic pain of left knee    Complex tear of medial meniscus of left knee as current injury    Osteoarthritis of left knee    Patellofemoral disorder of left knee    HPV in female    Essential hypertension    Class 1 obesity due to excess calories without serious comorbidity with body mass index (BMI) of 34.0 to 34.9 in adult    Post-op pain       Patient's Goals:     6/26 7/3 7/10 7/17 7/31        FOTO             Eval/Re-eval IE                         Education             HEP/POC ELISSA            Manuals             B/l hip PROM                                       Ther Ex             HS stretch Seated 30\"x3 Seated 10\"x5B           SKTC   10\"X10 10\" x 10  10\"x10        LTR 5\"x10 10\"x10 10\"x10 10\" x 10  10x10\"        Bridges 5\"x10  5\"x10 5\" 2 x 10  5\" 2x10        PPT  5\" x10 5\"x10 5\" x 10 5\"x10                     SLR--flex             TvA  5\"x10 5\"x10 5\" x 10  5\"x10      " "  TvA marching  5\" 2x10 5\" 2x10 5\"  2 x 10  5\" 2x10        TvA BKFO             Ball crushes  Supine 5\" x10 Supine 5\" x10 Supine 5\" x 10  5\" 2x10        Hip abd/add  PTB  5\" 2x10 PTB  5\" 2x10 PTB  5\" 2 x 10  PTB 5\" 2x10        3-way forward flexion  Physioball flex 5\"x10ea np          Neuro Re-ed             Sciatic nerve glide   X10 B X 10 B X10 B        Pallof press   BTB 5\" 2x10 BTB   5\" 2 x 10  BTB 5\" 2x10        Ther Activity             Bike 5' nv 5' w. MHP 5' w/ MHP 5' w MHP 5' w/  MHP        STS   5# KB hi-lo x15 5#  KB  Hi-lo nv        Leg press   44# x10 44# x 10 44# x10        Gait Training                                       Modalities             HP prn  W/ bike W/ bike  W/ bike                                    "

## 2024-08-15 ENCOUNTER — OFFICE VISIT (OUTPATIENT)
Dept: PODIATRY | Facility: CLINIC | Age: 54
End: 2024-08-15
Payer: COMMERCIAL

## 2024-08-15 VITALS
RESPIRATION RATE: 18 BRPM | BODY MASS INDEX: 33.79 KG/M2 | SYSTOLIC BLOOD PRESSURE: 129 MMHG | HEIGHT: 61 IN | DIASTOLIC BLOOD PRESSURE: 84 MMHG | WEIGHT: 179 LBS | HEART RATE: 94 BPM

## 2024-08-15 DIAGNOSIS — M25.571 ARTHRALGIA OF RIGHT FOOT: Primary | ICD-10-CM

## 2024-08-15 DIAGNOSIS — S99.921D INJURY OF PLANTAR PLATE OF RIGHT FOOT, SUBSEQUENT ENCOUNTER: ICD-10-CM

## 2024-08-15 PROCEDURE — 99213 OFFICE O/P EST LOW 20 MIN: CPT | Performed by: PODIATRIST

## 2024-08-15 RX ORDER — GABAPENTIN 300 MG/1
300 CAPSULE ORAL
Qty: 90 CAPSULE | Refills: 0 | Status: SHIPPED | OUTPATIENT
Start: 2024-08-15 | End: 2024-11-13

## 2024-08-15 NOTE — PROGRESS NOTES
Patient presents for assessment of right foot and specifically right second MPJ.  Patient states that she is ambulating keeping the toe taped into best alignment but still has intermittent pain when walking.  She describes this pain as shooting plantar aspect of the second MPJ.  Still, she notes that there is improvement in the pain is relatively mild.    Patient has limitations at work.  She has not returned to steel toed work boots.  She does not go up and down steps and cannot will utilize machinery.    On exam, right second toe is elevated above the plane of lesser toes secondary to plantar plate pathology.  Pain is present with palpation at the plantar aspect of the second MPJ.    Treatment: Patient told to continue with work restrictions.  She should try to return to the steel toed work boot beginning at home.  She was placed on gabapentin 300 mg at bedtime.  She is disinterested in cortisone injection.  A 90-day supply was provided.  She will be rescheduled in 2 months.

## 2024-08-23 ENCOUNTER — TELEPHONE (OUTPATIENT)
Age: 54
End: 2024-08-23

## 2024-08-23 NOTE — TELEPHONE ENCOUNTER
Caller: Sharyn Oakes    Doctor and/or Office: Dr. Dominguez/[facility]    #: [phone]    Escalation: Disability forms Patient would like a return call to discuss disability paperwork. I let her know we received her forms on 8-20, but she has questions on getting them filled out. I let her know turn around time but she wants a follow up call back. Thank you.

## 2024-08-30 ENCOUNTER — OFFICE VISIT (OUTPATIENT)
Dept: OBGYN CLINIC | Facility: CLINIC | Age: 54
End: 2024-08-30
Payer: COMMERCIAL

## 2024-08-30 VITALS — WEIGHT: 179 LBS | BODY MASS INDEX: 33.79 KG/M2 | HEIGHT: 61 IN

## 2024-08-30 DIAGNOSIS — M54.40 ACUTE RIGHT-SIDED LOW BACK PAIN WITH SCIATICA, SCIATICA LATERALITY UNSPECIFIED: Primary | ICD-10-CM

## 2024-08-30 PROCEDURE — 99203 OFFICE O/P NEW LOW 30 MIN: CPT | Performed by: FAMILY MEDICINE

## 2024-08-30 NOTE — LETTER
August 30, 2024     Stuart Benson MD  [address] [place] [address]   [address]  [place] [address]    Patient: Sharyn Oakes   YOB: 1970   Date of Visit: 8/30/2024       Dear Dr. Benson:    Thank you for referring Sharyn Oakes to me for evaluation. Below are the relevant portions of my assessment and plan of care.         If you have questions, please do not hesitate to call me. I look forward to following Sharyn along with you.         Sincerely,        Lexie Phillips,         CC: No Recipients

## 2024-08-30 NOTE — PROGRESS NOTES
Assessment:     1. Acute right-sided low back pain with sciatica, sciatica laterality unspecified  Ambulatory Referral to Orthopedic Surgery    MRI lumbar spine wo contrast      2. BMI 33.0-33.9,adult          Orders Placed This Encounter   Procedures    MRI lumbar spine wo contrast        Impression:   Low back pain pain likely multifactorial secondary to degenerative changes.       #: [phone]    Conservative Management   We discussed different treatment options:  Reviewed formal physical therapy documentation completed on 7/31/2024.  Reviewed PCP documentation completed on 6/12/2024.  Treatment plan consisted of x-rays and referral to PT.  Ice or Heat Therapy as needed 1-2 times daily for 10-20 minutes. As tolerated.   Over the counter Tylenol and/or NSAIDs  as needed based off your Past Medical Hx. Please follow product label for dosing and maximum limits.    Formal Handout provided on General Information of core exercises   Please range joint through gentle range of motion as tolerated.   Will continue with formal physical therapy   Pending lumbar spine MRI due to failed conservative management         Imaging   Reviewed prior xrays obtain by Primary Care Physician. These were reviewed in office with patient today.   06/27/2024 for lumbar spine x-ray: age appropriate degenerative changes.     IMPRESSION:      Slight rightward thoracolumbar curvature. Mild to moderate anterior spondylolisthesis of L4 on L5.   Multilevel degenerative facet hypertrophy in the mid to lower lumbar spine with degenerative disc space narrowing at L4-L5 and L5-S1. Degenerative disc space narrowing at L1-L2.    Pending lumbar spine MRI     Procedure  No Injection performed today. May consider future corticosteroid injection depending on clinical exam/imaging.      Shared decision making, patient agreeable to plan.      Return for Follow up after completion of advanced imaging.    HPI:   Sharyn Oakes is a 54 y.o. female  who  presents for evaluation of   Chief Complaint   Patient presents with    Spine - Pain     Has started PT for back  lower back        PCP, Stuart Benson MD referred patient for low back pain   Onset/Mechanism: Low back pain approximately 1 year.  Pain has been worsening since the summer months.  Location: Low back bilaterally.  Severity: Current severity: 6/10. Max severity: 7/10.  Pain described as:stabbing   Radiation: Pain radiating down the legs..  Provocative: Prolonged walking, getting up from a seated position, lifting something heavy.      Denies any new or changes to previous weakness down into affected limb..   Denies any recent onset bowel or bladder control problems.   Denies any saddle paresthesias/anaesthesia.   Denies recent sudden loss of weight or appetite.   Denies recent systemic symptoms like fever or chills.  Denies personal history of cancer.     Summary of treatment to-date:   Formal physical therapy       Following History Reviewed and Updated     Past Medical History:   Diagnosis Date    Arthritis     Bacterial vaginosis     Bilateral bunions     COVID-19     Migraine     Obesity     Varicella      Past Surgical History:   Procedure Laterality Date    WY ARTHRS KNE SURG W/MENISCECTOMY MED/LAT W/SHVG Left 02/12/2020    Procedure: ARTHROSCOPY KNEE partial medial menisectomy;  Surgeon: Abel Suarez MD;  Location:  MAIN OR;  Service: Orthopedics    [place] CORRECTION HAMMERTOE Right 12/23/2022    Procedure: REPAIR RIGHT  HAMMERTOE  2nd;  Surgeon: Bin Dominguez DPM;  Location:  MAIN OR;  Service: Podiatry    [place] CORRJ HLX VLGS BNCTY SESMDC DSTL METAR OSTEOT Bilateral 12/23/2022    Procedure: BUNIONECTOMY LIZA;  Surgeon: Bin Dominguez DPM;  Location:  MAIN OR;  Service: Podiatry    [place] OSTECTOMY PRTL 5TH METAR HEAD SPX Bilateral 12/23/2022    Procedure: BUNIONECTOMY JACOBO;  Surgeon: Bin Dominguez DPM;  Location:  MAIN OR;  Service: Podiatry    [place] SYNDACTYLIZATION TOES Right  2/16/2024    Procedure: PLANTAR PLATE REPAIR WITH CAPSULORRAPHY;  Surgeon: Bin Dominguez DPM;  Location: [facility];  Service: Podiatry     Family History   Problem Relation Age of Onset    Hypertension Mother     Prostate cancer Father         unknown age    Cancer Father     No Known Problems Sister     No Known Problems Daughter     No Known Problems Maternal Grandmother     No Known Problems Maternal Grandfather     No Known Problems Paternal Grandmother     No Known Problems Paternal Grandfather     No Known Problems Maternal Aunt     No Known Problems Paternal Aunt        Social History     Substance and Sexual Activity   Alcohol Use Yes    Alcohol/week: 1.0 standard drink of alcohol    Types: 1 Cans of beer per week    Comment: rare     Social History     Substance and Sexual Activity   Drug Use Never     Social History     Tobacco Use   Smoking Status Never   Smokeless Tobacco Never       Social Determinants of Health     Tobacco Use: Low Risk  (8/30/2024)    Patient History     Smoking Tobacco Use: Never     Smokeless Tobacco Use: Never     Passive Exposure: Not on file   Alcohol Use: Not At Risk (4/9/2021)    AUDIT-C     Frequency of Alcohol Consumption: Monthly or less     Average Number of Drinks: 1 or 2     Frequency of Binge Drinking: Never   Financial Resource Strain: Low Risk  (5/15/2020)    Overall Financial Resource Strain (CARDIA)     Difficulty of Paying Living Expenses: Not very hard   Food Insecurity: No Food Insecurity (5/15/2020)    Hunger Vital Sign     Worried About Running Out of Food in the Last Year: Never true     Ran Out of Food in the Last Year: Never true   Transportation Needs: No Transportation Needs (5/15/2020)    PRAPARE - Transportation     Lack of Transportation (Medical): No     Lack of Transportation (Non-Medical): No   Physical Activity: Inactive (5/15/2020)    Exercise Vital Sign     Days of Exercise per Week: 0 days     Minutes of Exercise per Session: 0 min   Stress: No  "Stress Concern Present (5/15/2020)    [employer] - Occupational Stress Questionnaire     Feeling of Stress : Only a little   Social Connections: Unknown (5/15/2020)    Social Connection and Isolation Panel [NHANES]     Frequency of Communication with Friends and Family: Patient declined     Frequency of Social Gatherings with Friends and Family: Patient declined     Attends Temple Services: Patient declined     Active Member of Clubs or Organizations: Patient declined     Attends Club or Organization Meetings: Patient declined     Marital Status: Patient declined   Intimate Partner Violence: Unknown (5/15/2020)    Humiliation, Afraid, Rape, and Kick questionnaire     Fear of Current or Ex-Partner: Patient declined     Emotionally Abused: Patient declined     Physically Abused: Patient declined     Sexually Abused: Patient declined   Depression: Not at risk (6/12/2024)    PHQ-2     PHQ-2 Score: 0   Housing Stability: Not on file   Utilities: Not on file   Health Literacy: Not on file        Allergies   Allergen Reactions    Penicillins Anaphylaxis       Review of Systems      Review of Systems     Review of Systems   Constitutional: Negative for chills and fever.   HENT: Negative for drooling and sneezing.    Eyes: Negative for redness.   Respiratory: Negative for cough and wheezing.    Gastrointestinal: Negative for vomiting.   Psychiatric/Behavioral: Negative for behavioral problems. The patient is not nervous/anxious.      All other systems negative.   Physical Exam   Physical Exam    Vitals and nursing note reviewed.  Constitutional:   Appearance. Normal Appearance.  Ht 5' 1\" (1.549 m)   Wt 81.2 kg (179 lb)   BMI 33.82 kg/m²     Body mass index is 33.82 kg/m².   HENT:  Head: Atraumatic.  Nose: Nose normal  Eyes: Conjunctiva/sclera: Conjunctivae normal.  Cardiovascular:   Rate and Rhythm: Bilateral equal distal pulses  Pulmonary:   Effort: Pulmonary effort is normal  Skin: " "  General: Skin is warm and dry.  Neurological:   General: No focal deficit present.  Mental Status: Alert and oriented to person, place, and time.   Psychiatric:   Mood and Affect: mood normal.  Behavior: Behavior normal     Musculoskeletal Exam     Ortho Exam         Lumbar spine    Inspection:  Gait Gross deformity Erythema Warmth   Normal Negative       TENDERNESS:  Thoracic/Lumbar Spinous Processes Paraspinal Muscles SI Joint: Sacrum LORENA Greater Trochanteric Bursa   Negative Positive B/L  Negative  Negative     LUMBAR Range of Motion:  Flexion Extension Sidebending Rotation   Intact, with discomfort  Intact Intact Intact     HIP Range of Motion:  Grossly intact   Hip Supine Supine Prone Prone   Flexion ER IR ER IR            DERMATOMAL SENSATION:  L1 L2 L3 L4 L5 S1   Intact Intact Intact Intact Intact Intact     STRENGTH (bilateral):  Hip flexion Knee Flexion Knee extension Foot dorsiflexion Great toe extension Foot plantarflexion   5/5 5/5 5/5 5/5 5/5 5/5     SPECIAL TESTS:       SI JOINT ASIS COMPRESSION TEST:  STORK TEST:  FORTON'S FINGER: SKYLAR SI PAIN:   Negative   N/A Negative       Special Tests:   Terell test Bicycle test Adductor squeeze Piriformis TEST:   GAENSLIN'S TEST:  Pubalgia   Supine, unaffected hip is hyperflexed Supine, opposite active b/l hip flexion / extension  Supine, hips flexed 45, active activation of adductors  Lies on unaffected hip with knees flexed and abducts against resistance  Hyperflexed unaffected hip, extended hip has downward force applied  Lying supine, perform sit-up   Negative or without hip flexion contracture of contralateral leg    N/A         Neurovascular:  Sensation to light touch Posterior tibial artery   Intact and equal bilaterally Intact and equal bilaterally     (Test not completed if space left blank )         Procedures       Portions of the record may have been created with voice recognition software. Occasional wrong word or \"sound alike\" substitutions may " have occurred due to the inherent limitations of voice recognition software. Please review the chart carefully and recognize, using context, where substitutions/typographical errors may have occurred.

## 2024-09-04 ENCOUNTER — EVALUATION (OUTPATIENT)
Dept: PHYSICAL THERAPY | Facility: REHABILITATION | Age: 54
End: 2024-09-04
Payer: COMMERCIAL

## 2024-09-04 DIAGNOSIS — G89.29 CHRONIC MIDLINE LOW BACK PAIN WITHOUT SCIATICA: Primary | ICD-10-CM

## 2024-09-04 DIAGNOSIS — M54.50 CHRONIC MIDLINE LOW BACK PAIN WITHOUT SCIATICA: Primary | ICD-10-CM

## 2024-09-04 DIAGNOSIS — M53.3 SACROILIAC JOINT PAIN: ICD-10-CM

## 2024-09-04 PROCEDURE — 97164 PT RE-EVAL EST PLAN CARE: CPT | Performed by: PHYSICAL THERAPIST

## 2024-09-04 PROCEDURE — 97110 THERAPEUTIC EXERCISES: CPT | Performed by: PHYSICAL THERAPIST

## 2024-09-04 PROCEDURE — 97530 THERAPEUTIC ACTIVITIES: CPT | Performed by: PHYSICAL THERAPIST

## 2024-09-04 NOTE — PROGRESS NOTES
PT Re-Evaluation     Today's date: 2024  Patient name: Sharyn Oakes  : 1970  MRN: [id number]  Referring provider: Stuart Benson*  Dx:   Encounter Diagnosis     ICD-10-CM    1. Chronic midline low back pain without sciatica  M54.50     G89.29       2. Sacroiliac joint pain  M53.3             Start Time: 1515  Stop Time: 1555  Total time in clinic (min): 40 minutes    Assessment  Impairments: abnormal or restricted ROM, abnormal movement, activity intolerance, lacks appropriate home exercise program and pain with function  Symptom irritability: low    Assessment details: Sharyn Oakes presents for re-evaluation appointment after hiatus in PT due to personal/work schedule. Pt presents with continued SIJ dysfunction with (+) sacral spring testing and (+) SIJ compression testing, decreased R>L strength and fair TvA strength and endurance resulting in continued difficulty with prolonged activity and static positioning including sitting which is required to complete for work. Pt was significantly educated on importance of increasing standing duration while decreasing sedentary activity at work and to go for leisure walks during lunch time to improve activity tolerance. Pt reported that she wishes to complete exercises at home at this time due to complexity of work schedule however pt would continue to benefit from skilled PT to address deficits noted and improve overall function. Pt provided updated HEP and encouraged to contact primary PT during 3 week trial of HEP with any questions or concerns.     Sharyn Oakes is a 53 y.o. female that presents to outpatient physical therapy with complaints of acute on chronic low back pain resulting in significant activity limitations. Pt demonstrates decreased lumbar ROM with extension most limited, decreased b/l hip flexion ROM, decreased proximal hip strength, (-) SIJ testing including compression/distraction testing and sacral spring test leading to gait  dysfunction. Pt at this time would be an excellent candidate for skilled PT in order to attain set goals and optimize overall function. Thank you for this referral.  Understanding of Dx/Px/POC: good     Prognosis: fair    Goals  Short-term: 4 weeks  1. Pt will be independent with initial HEP -MET  2. Pt will improve lumbar ROM by 25% -MET  3. Pt will improve b/l LE MMT by 1/2 grade -MET  4. Pt will be able to sit for 10 minutes with pain at worst of 5/10 -NOT MET      Long-term: 8 weeks  1. Pt will score equal or better than projected score on FOTO to show improvement in pain and function. -MET  2. Pt will be able to ambulate 30 minutes with pain at worst of 3/10 -NOT MET  3. Pt will improve lumbar ROM to WFL by discharge -MET  4. Pt will improve b/l LE MMT to 4+/5 by discharge -NOT MET    Plan  Patient would benefit from: skilled physical therapy    Planned therapy interventions: joint mobilization, manual therapy, nerve gliding, neuromuscular re-education, patient/caregiver education, self care, stretching, strengthening, home exercise program, functional ROM exercises, flexibility, graded exercise, graded activity, therapeutic activities, therapeutic exercise and abdominal trunk stabilization    Frequency: 1x week  Duration in weeks: 6  Treatment plan discussed with: patient  Plan details: Agreeable to 1x/week due to work schedule        Subjective Evaluation    History of Present Illness  Mechanism of injury: 9/4/2024: Re-evaluation   Sharyn Oakes presents to OPPT for re-evaluation appointment stating that she has noticed improvement in low back pain since initiation of PT. She reports she had a recent change in work schedule which is the reason she was unable to attend skilled PT in facility. Pt reports that she has most difficulty with lifting during ADLs/IADLs or when performing occupational requirements. She notes pain is still present with prolonged sitting which she is required to complete at work and  stiffness noted after prolonged positioning. Pt notes she had recent follow-up with Dr. Mann and was referred for MRI on .       Sharyn Oakes is a 53 y.o. female that presents to OPPT stating that she's been experiencing acute on chronic low back pain with referral of symptoms into b/l glutes. Pt reports that symptoms are worse with sitting and she reports that it may be due to prolonged positioning. Pt reports that she is currently working but is on restrictions due to recent R foot surgery. Current restrictions include, no lifting, no stair negotiation or decreased ambulation. Pt currently has pain with lifting, pushing/pulling, prolonged ambulating and sitting/standing. Pt does report N/T into glute regions however denies further referral of pain. Pt denies B/B changes, saddle anesthesia, history of cancer, night pain or night sweats.   Patient Goals  Patient goals for therapy: decreased pain, increased motion, increased strength, independence with ADLs/IADLs and return to work    Pain  Current pain rating: 3  At worst pain ratin  Location: lumbar spine      Diagnostic Tests  Abnormal x-ray: ordered, not completed.  Treatments  Previous treatment: physical therapy        Objective     Static Posture   General Observations  Shifted right and guarded.     Tenderness     Left Hip   Tenderness in the sacroiliac joint.     Right Hip   Tenderness in the sacroiliac joint.     Additional Tenderness Details  (+) sacral testing     Neurological Testing     Sensation     Lumbar   Left   Intact: light touch    Right   Intact: light touch    Hip   Left Hip   Intact: light touch    Right Hip   Intact: light touch    Reflexes   Left   Patellar (L4): normal (2+)  Achilles (S1): normal (2+)    Right   Patellar (L4): normal (2+)  Achilles (S1): normal (2+)    Active Range of Motion     Lumbar   Flexion:  WFL and with pain  Extension:  with pain Restriction level: moderate  Left lateral flexion:  WFL and with pain  Right  lateral flexion:  WFL and with pain  Left rotation:  WFL and with pain  Right rotation:  WFL and with pain    Passive Range of Motion   Left Hip   Flexion: 100 degrees   External rotation (90/90): WFL  Internal rotation (90/90): WFL    Right Hip   Flexion: 90 degrees   External rotation (90/90): WFL  Internal rotation (90/90): WFL    Joint Play     Pain: L1, L2, L3, L4, L5 and S1     Strength/Myotome Testing     Left Hip   Planes of Motion   Flexion: 4+  Extension: 3+  Abduction: 4  External rotation: 4  Internal rotation: 4+    Right Hip   Planes of Motion   Flexion: 5  Extension: 5  Abduction: 5  External rotation: 5  Internal rotation: 5    Tests     Lumbar   Positive SIJ compression and sacroiliac distraction .   Negative sacral spring .     Left   Positive quadrant.   Negative crossed SLR, passive SLR and slump test.     Right   Negative crossed SLR, passive SLR, quadrant and slump test.     Left Pelvic Girdle/Sacrum   Negative: active SLR test.     Right Pelvic Girdle/Sacrum   Negative: active SLR test.     Left Hip   Positive SI compression and SI distraction.   Negative SKYLAR, FADIR and scour.   90/90 SLR: Negative.   SLR: Negative.     Right Hip   Positive SI compression and SI distraction.   Negative SKYLAR, FADIR and scour.   90/90 SLR: Negative.  SLR: Negative.     Ambulation     Observational Gait   Gait: antalgic   Left arm swing: decreased  Graphical documentation:                  Precautions:  Patient Active Problem List   Diagnosis    Tricompartment osteoarthritis of right knee    Annual physical exam    Chronic pain of left knee    Complex tear of medial meniscus of left knee as current injury    Osteoarthritis of left knee    Patellofemoral disorder of left knee    HPV in female    Essential hypertension    Class 1 obesity due to excess calories without serious comorbidity with body mass index (BMI) of 34.0 to 34.9 in adult    Post-op pain       Patient's Goals:     6/26 7/3 7/10 7/17 7/31 9/4      "  FOTO             Eval/Re-eval IE                         Education             HEP/POC ELISSA            Manuals             B/l hip PROM                                       Ther Ex             HS stretch Seated 30\"x3 Seated 10\"x5B           SKTC   10\"X10 10\" x 10  10\"x10        LTR 5\"x10 10\"x10 10\"x10 10\" x 10  10x10\" 10\"x10 R only       Bridges 5\"x10  5\"x10 5\" 2 x 10  5\" 2x10 Stag stance 5\" x10       PPT  5\" x10 5\"x10 5\" x 10 5\"x10 5\"x10                    SLR--flex      5\"x10       TvA  5\"x10 5\"x10 5\" x 10  5\"x10 HEP       TvA marching  5\" 2x10 5\" 2x10 5\"  2 x 10  5\" 2x10 5\" 2x10       TvA BKFO      BTB 5\" 2x10       Ball crushes  Supine 5\" x10 Supine 5\" x10 Supine 5\" x 10  5\" 2x10        Hip abd/add  PTB  5\" 2x10 PTB  5\" 2x10 PTB  5\" 2 x 10  PTB 5\" 2x10        3-way forward flexion  Physioball flex 5\"x10ea np          Neuro Re-ed             Sciatic nerve glide   X10 B X 10 B X10 B D/c       Pallof press   BTB 5\" 2x10 BTB   5\" 2 x 10  BTB 5\" 2x10 BTB 5\" 2x10       Ther Activity             Bike 5' nv 5' w. MHP 5' w/ MHP 5' w MHP 5' w/  MHP Np nv       STS   5# KB hi-lo x15 5#  KB  Hi-lo nv        Leg press   44# x10 44# x 10 44# x10 44# x10       Captain florida's      5\"x10B       Side steps      GTB 3 laps        Primal push up      HEP       Gait Training                                       Modalities             HP prn  W/ bike W/ bike  W/ bike                                "

## 2024-09-06 ENCOUNTER — APPOINTMENT (OUTPATIENT)
Dept: PHYSICAL THERAPY | Facility: REHABILITATION | Age: 54
End: 2024-09-06
Payer: COMMERCIAL

## 2024-09-11 ENCOUNTER — HOSPITAL ENCOUNTER (OUTPATIENT)
Dept: MRI IMAGING | Facility: HOSPITAL | Age: 54
Discharge: HOME | End: 2024-09-11
Attending: FAMILY MEDICINE
Payer: COMMERCIAL

## 2024-09-11 DIAGNOSIS — M54.40 ACUTE RIGHT-SIDED LOW BACK PAIN WITH SCIATICA, SCIATICA LATERALITY UNSPECIFIED: ICD-10-CM

## 2024-09-11 PROCEDURE — 72148 MRI LUMBAR SPINE W/O DYE: CPT

## 2024-09-14 DIAGNOSIS — I10 ESSENTIAL HYPERTENSION: ICD-10-CM

## 2024-09-14 RX ORDER — LOSARTAN POTASSIUM 25 MG/1
25 TABLET ORAL DAILY
Qty: 90 TABLET | Refills: 2 | Status: SHIPPED | OUTPATIENT
Start: 2024-09-14

## 2024-09-25 ENCOUNTER — OFFICE VISIT (OUTPATIENT)
Dept: OBGYN CLINIC | Facility: CLINIC | Age: 54
End: 2024-09-25
Payer: COMMERCIAL

## 2024-09-25 ENCOUNTER — OFFICE VISIT (OUTPATIENT)
Dept: PHYSICAL THERAPY | Facility: REHABILITATION | Age: 54
End: 2024-09-25
Payer: COMMERCIAL

## 2024-09-25 VITALS — BODY MASS INDEX: 33.79 KG/M2 | WEIGHT: 179 LBS | HEIGHT: 61 IN

## 2024-09-25 DIAGNOSIS — M51.369 L4-L5 DISC BULGE: ICD-10-CM

## 2024-09-25 DIAGNOSIS — M54.50 CHRONIC MIDLINE LOW BACK PAIN WITHOUT SCIATICA: Primary | ICD-10-CM

## 2024-09-25 DIAGNOSIS — G89.29 CHRONIC MIDLINE LOW BACK PAIN WITHOUT SCIATICA: Primary | ICD-10-CM

## 2024-09-25 DIAGNOSIS — M53.3 SACROILIAC JOINT PAIN: ICD-10-CM

## 2024-09-25 DIAGNOSIS — N28.1 KIDNEY CYSTS: ICD-10-CM

## 2024-09-25 DIAGNOSIS — M51.36 L4-L5 DISC BULGE: ICD-10-CM

## 2024-09-25 DIAGNOSIS — M43.16 SPONDYLOLISTHESIS OF LUMBAR REGION: Primary | ICD-10-CM

## 2024-09-25 PROCEDURE — 99214 OFFICE O/P EST MOD 30 MIN: CPT | Performed by: FAMILY MEDICINE

## 2024-09-25 PROCEDURE — 97110 THERAPEUTIC EXERCISES: CPT | Performed by: PHYSICAL THERAPIST

## 2024-09-25 PROCEDURE — 97535 SELF CARE MNGMENT TRAINING: CPT | Performed by: PHYSICAL THERAPIST

## 2024-09-25 PROCEDURE — 97112 NEUROMUSCULAR REEDUCATION: CPT | Performed by: PHYSICAL THERAPIST

## 2024-09-25 NOTE — PROGRESS NOTES
Assessment:     1. Spondylolisthesis of lumbar region  Ambulatory Referral to [facility]    Ambulatory referral to Spine & Pain Management      2. Kidney cysts        3. L4-L5 disc bulge  Ambulatory Referral to [facility]    Ambulatory referral to Spine & Pain Management        Orders Placed This Encounter   Procedures    Ambulatory Referral to [facility]    Ambulatory referral to Spine & Pain Management        Impression:   Low back pain pain likely multifactorial secondary to degenerative changes, minor disc bulge, anterior listhesis of L5 on S1.  Findings incidental kidney cyst      #: [id number] / [id number]     Conservative Management   We discussed different treatment options:  Previous discussion / review   Reviewed formal physical therapy documentation completed on 7/31/2024.  Reviewed PCP documentation completed on 6/12/2024.  Treatment plan consisted of x-rays and referral to PT.  Ice or Heat Therapy as needed 1-2 times daily for 10-20 minutes. As tolerated.   Over the counter Tylenol and/or NSAIDs  as needed based off your Past Medical Hx. Please follow product label for dosing and maximum limits.    Formal Handout provided on General Information of core exercises   Please range joint through gentle range of motion as tolerated.   Will continue with formal physical therapy   Pending lumbar spine MRI due to failed conservative management    Today's discussion / review   MRI reviewed   Continue with formal physical therapy   Referral to spine and pain   For the incidental cyst finding recommend follow-up with PCP.        Imaging   Reviewed prior xrays obtain by Primary Care Physician. These were reviewed in office with patient today.   06/27/2024 for lumbar spine x-ray: age appropriate degenerative changes.                            IMPRESSION:      Slight rightward thoracolumbar curvature. Mild to moderate anterior spondylolisthesis of L4 on L5.   Multilevel  degenerative facet hypertrophy in the mid to lower lumbar spine with degenerative disc space narrowing at L4-L5 and L5-S1. Degenerative disc space narrowing at L1-L2.     09/11/2024 lumbar spine MRI  IMPRESSION:     Mild noncompressive lumbar degenerative change most pronounced at L4-5 where there is annular bulging and facet degenerative change.       Procedure  No Injection performed today. May consider future corticosteroid injection depending on clinical exam/imaging.        Shared decision making, patient agreeable to plan.      Return for Follow up with Pain Management.    HPI:   Sharyn Oakes is a 54 y.o. female  who presents for evaluation of   Chief Complaint   Patient presents with    Spine - Pain     Has started PT for back  lower back      Today's visit  Denies any new trauma   Pain remains the same       Previous 08/30/2024   PCP, Stuart Benson MD referred patient for low back pain   Onset/Mechanism: Low back pain approximately 1 year.  Pain has been worsening since the summer months.  Location: Low back bilaterally.  Severity: Current severity: 6/10. Max severity: 7/10.  Pain described as:stabbing   Radiation: Pain radiating down the legs..  Provocative: Prolonged walking, getting up from a seated position, lifting something heavy.        Denies any new or changes to previous weakness down into affected limb..   Denies any recent onset bowel or bladder control problems.   Denies any saddle paresthesias/anaesthesia.   Denies recent sudden loss of weight or appetite.   Denies recent systemic symptoms like fever or chills.  Denies personal history of cancer.      Summary of treatment to-date:   Formal physical therapy     Following History Reviewed and Updated     Past Medical History:   Diagnosis Date    Arthritis     Bacterial vaginosis     Bilateral bunions     COVID-19     Migraine     Obesity     Varicella      Past Surgical History:   Procedure Laterality Date    MO ARTHRS KNE SURG  W/MENISCECTOMY MED/LAT W/SHVG Left 02/12/2020    Procedure: ARTHROSCOPY KNEE partial medial menisectomy;  Surgeon: Abel Saurez MD;  Location:  MAIN OR;  Service: Orthopedics    NE CORRECTION HAMMERTOE Right 12/23/2022    Procedure: REPAIR RIGHT  HAMMERTOE  2nd;  Surgeon: Bin Dominguez DPM;  Location:  MAIN OR;  Service: Podiatry    NE CORRJ HLX VLGS BNCTY SESMDC DSTL METAR OSTEOT Bilateral 12/23/2022    Procedure: BUNIONECTOMY LIZA;  Surgeon: Bin Dominguez DPM;  Location:  MAIN OR;  Service: Podiatry    NE OSTECTOMY PRTL 5TH METAR HEAD SPX Bilateral 12/23/2022    Procedure: BUNIONECTOMY TAILOR;  Surgeon: Bin Dominguez DPM;  Location:  MAIN OR;  Service: Podiatry    NE SYNDACTYLIZATION TOES Right 2/16/2024    Procedure: PLANTAR PLATE REPAIR WITH CAPSULORRAPHY;  Surgeon: Bin Dominguez DPM;  Location: [facility];  Service: Podiatry     Family History   Problem Relation Age of Onset    Hypertension Mother     Prostate cancer Father         unknown age    Cancer Father     No Known Problems Sister     No Known Problems Daughter     No Known Problems Maternal Grandmother     No Known Problems Maternal Grandfather     No Known Problems Paternal Grandmother     No Known Problems Paternal Grandfather     No Known Problems Maternal Aunt     No Known Problems Paternal Aunt        Social History     Substance and Sexual Activity   Alcohol Use Yes    Alcohol/week: 1.0 standard drink of alcohol    Types: 1 Cans of beer per week    Comment: rare     Social History     Substance and Sexual Activity   Drug Use Never     Social History     Tobacco Use   Smoking Status Never   Smokeless Tobacco Never       Social Determinants of Health     Tobacco Use: Low Risk  (9/25/2024)    Patient History     Smoking Tobacco Use: Never     Smokeless Tobacco Use: Never     Passive Exposure: Not on file   Alcohol Use: Not At Risk (4/9/2021)    AUDIT-C     Frequency of Alcohol Consumption: Monthly or less     Average Number of  Drinks: 1 or 2     Frequency of Binge Drinking: Never   Financial Resource Strain: Low Risk  (5/15/2020)    Overall Financial Resource Strain (CARDIA)     Difficulty of Paying Living Expenses: Not very hard   Food Insecurity: No Food Insecurity (5/15/2020)    Hunger Vital Sign     Worried About Running Out of Food in the Last Year: Never true     Ran Out of Food in the Last Year: Never true   Transportation Needs: No Transportation Needs (5/15/2020)    PRAPARE - Transportation     Lack of Transportation (Medical): No     Lack of Transportation (Non-Medical): No   Physical Activity: Inactive (5/15/2020)    Exercise Vital Sign     Days of Exercise per Week: 0 days     Minutes of Exercise per Session: 0 min   Stress: No Stress Concern Present (5/15/2020)    [employer] [place] [employer] - Occupational Stress Questionnaire     Feeling of Stress : Only a little   Social Connections: Unknown (5/15/2020)    Social Connection and Isolation Panel [NHANES]     Frequency of Communication with Friends and Family: Patient declined     Frequency of Social Gatherings with Friends and Family: Patient declined     Attends Religion Services: Patient declined     Active Member of Clubs or Organizations: Patient declined     Attends Club or Organization Meetings: Patient declined     Marital Status: Patient declined   Intimate Partner Violence: Unknown (5/15/2020)    Humiliation, Afraid, Rape, and Kick questionnaire     Fear of Current or Ex-Partner: Patient declined     Emotionally Abused: Patient declined     Physically Abused: Patient declined     Sexually Abused: Patient declined   Depression: Not at risk (6/12/2024)    PHQ-2     PHQ-2 Score: 0   Housing Stability: Not on file   Utilities: Not on file   Health Literacy: Not on file        Allergies   Allergen Reactions    Penicillins Anaphylaxis       Review of Systems      Review of Systems     Review of Systems   Constitutional: Negative for chills and fever.   HENT:  "Negative for drooling and sneezing.    Eyes: Negative for redness.   Respiratory: Negative for cough and wheezing.    Gastrointestinal: Negative for vomiting.   Psychiatric/Behavioral: Negative for behavioral problems. The patient is not nervous/anxious.      All other systems negative.   Physical Exam   Physical Exam    Vitals and nursing note reviewed.  Constitutional:   Appearance. Normal Appearance.  Ht 5' 1\" (1.549 m)   Wt 81.2 kg (179 lb)   BMI 33.82 kg/m²     Body mass index is 33.82 kg/m².   HENT:  Head: Atraumatic.  Nose: Nose normal  Eyes: Conjunctiva/sclera: Conjunctivae normal.  Cardiovascular:   Rate and Rhythm: Bilateral equal distal pulses  Pulmonary:   Effort: Pulmonary effort is normal  Skin:   General: Skin is warm and dry.  Neurological:   General: No focal deficit present.  Mental Status: Alert and oriented to person, place, and time.   Psychiatric:   Mood and Affect: mood normal.  Behavior: Behavior normal     Musculoskeletal Exam     Ortho Exam       B/L HIP and BACK     Inspection:  Gait Gross deformity Erythema Warmth   Normal Negative       TENDERNESS:  Thoracic/Lumbar Spinous Processes Paraspinal Muscles SI Joint: Sacrum LORENA Greater Trochanteric Bursa   Negative Positive on the right and left  Positive on the left       LUMBAR Range of Motion:  Flexion Extension Sidebending Rotation   Intact, with discomfort Intact, with discomfort Intact, with discomfort Intact, with discomfort       DERMATOMAL SENSATION:  L1 L2 L3 L4 L5 S1   Intact Intact Intact Intact Intact Intact     STRENGTH (bilateral):  Hip flexion Knee Flexion Knee extension Foot dorsiflexion Great toe extension Foot plantarflexion   5/5 5/5 5/5 5/5 5/5 5/5     SPECIAL TESTS:       SI JOINT ASIS COMPRESSION TEST:  STORK TEST:  FORTON'S FINGER: SKYLAR SI PAIN:     N/A       Neurovascular:  Sensation to light touch Skin   Intact and equal bilaterally Warm and dry     (Test not completed if space left blank )           Procedures     " "  Portions of the record may have been created with voice recognition software. Occasional wrong word or \"sound alike\" substitutions may have occurred due to the inherent limitations of voice recognition software. Please review the chart carefully and recognize, using context, where substitutions/typographical errors may have occurred.   "

## 2024-09-25 NOTE — LETTER
September 25, 2024     Stuart Benson MD  [address] [place] [address]   [address]  [place] [address]    Patient: Sharyn Oakes   YOB: 1970   Date of Visit: 9/25/2024       Dear Dr. Benson:    Thank you for referring Sharyn Oakes to me for evaluation. Below are the relevant portions of my assessment and plan of care.         If you have questions, please do not hesitate to call me. I look forward to following Sharyn along with you.         Sincerely,        Lexie Phillips,         CC: No Recipients

## 2024-09-25 NOTE — PROGRESS NOTES
Daily Note     Today's date: 2024  Patient name: Sharyn Oakes  : 1970  MRN: [id number]  Referring provider: Stuart Benson*  Dx:   Encounter Diagnosis     ICD-10-CM    1. Chronic midline low back pain without sciatica  M54.50     G89.29       2. Sacroiliac joint pain  M53.3           Start Time: 1345  Stop Time: 1425  Total time in clinic (min): 40 minutes    Subjective: Pt reports that she had follow-up this morning with Dr. Mann and was referred to spine and pain management. Pt does not wish to receive CSI due to fear of weight gain. Pt also notes she wishes to continue physical therapy at home due to work schedule. Pt received MRI results which indicated mild noncompressive lumbar degenerative change most pronounced at L4-5 where there is annular bulging and facet degenerative change.       Objective: See treatment diary below      Assessment: Pt tolerated treatment well this visit however did experience increase in pain with extension based interventions including repeated extension and abdominal stabilization in standing. Pt provided significant education on fair prognosis due to limited PT visits and importance of consistency with performing therapeutic exercise program in facility versus home without appropriate instruction. Pt demonstrated understanding however will wait to continue to schedule follow-up appointments until appointment with Dr. Barrientos. Pt would however continue to benefit from skilled PT.      Plan:  Pt will call facility if wishes to continue with PT following appt with Dr. Barrientos (S&P management)     Precautions:  Patient Active Problem List   Diagnosis    Tricompartment osteoarthritis of right knee    Annual physical exam    Chronic pain of left knee    Complex tear of medial meniscus of left knee as current injury    Osteoarthritis of left knee    Patellofemoral disorder of left knee    HPV in female    Essential hypertension    Class 1 obesity due to excess calories  "without serious comorbidity with body mass index (BMI) of 34.0 to 34.9 in adult    Post-op pain       Patient's Goals:     6/26 7/3 7/10 7/17 7/31 9/4 9/25      FOTO             Eval/Re-eval IE                         Education             HEP/POC ELISSA      ELISSA      Manuals             B/l hip PROM                                       Ther Ex             HS stretch Seated 30\"x3 Seated 10\"x5B           SKTC   10\"X10 10\" x 10  10\"x10  10\"x5 ea      LTR 5\"x10 10\"x10 10\"x10 10\" x 10  10x10\" 10\"x10 R only 10\"x10      Bridges 5\"x10  5\"x10 5\" 2 x 10  5\" 2x10 Stag stance 5\" x10 5\" x10      PPT  5\" x10 5\"x10 5\" x 10 5\"x10 5\"x10 5\"x10                   SLR--flex      5\"x10 5\"X10      TvA  5\"x10 5\"x10 5\" x 10  5\"x10 HEP       TvA marching  5\" 2x10 5\" 2x10 5\"  2 x 10  5\" 2x10 5\" 2x10 5\" 2x10      TvA BKFO      BTB 5\" 2x10 PuTB 5\" 2x10      Ball crushes  Supine 5\" x10 Supine 5\" x10 Supine 5\" x 10  5\" 2x10  Stand 5\" x10 p!      Hip abd/add  PTB  5\" 2x10 PTB  5\" 2x10 PTB  5\" 2 x 10  PTB 5\" 2x10        3-way forward flexion  Physioball flex 5\"x10ea np   5\" x10ea 5\" x10ea      Neuro Re-ed             Sciatic nerve glide   X10 B X 10 B X10 B D/c       Pallof press   BTB 5\" 2x10 BTB   5\" 2 x 10  BTB 5\" 2x10 BTB 5\" 2x10       JOSE       2x10 p!      Ther Activity             Bike 5' nv 5' w. MHP 5' w/ MHP 5' w MHP 5' w/  MHP Np nv       STS   5# KB hi-lo x15 5#  KB  Hi-lo nv        Leg press   44# x10 44# x 10 44# x10 44# x10       Captain florida's      5\"x10B       Side steps      GTB 3 laps        Primal push up      HEP       Gait Training                                       Modalities             HP prn  W/ bike W/ bike  W/ bike                                "

## 2024-10-17 ENCOUNTER — OFFICE VISIT (OUTPATIENT)
Dept: PODIATRY | Facility: CLINIC | Age: 54
End: 2024-10-17
Payer: COMMERCIAL

## 2024-10-17 VITALS — RESPIRATION RATE: 18 BRPM | BODY MASS INDEX: 33.42 KG/M2 | HEIGHT: 61 IN | WEIGHT: 177 LBS

## 2024-10-17 DIAGNOSIS — M25.571 ARTHRALGIA OF RIGHT FOOT: Primary | ICD-10-CM

## 2024-10-17 PROCEDURE — 99212 OFFICE O/P EST SF 10 MIN: CPT | Performed by: PODIATRIST

## 2024-10-17 RX ORDER — GABAPENTIN 300 MG/1
300 CAPSULE ORAL
Qty: 90 CAPSULE | Refills: 1 | Status: SHIPPED | OUTPATIENT
Start: 2024-10-17 | End: 2025-04-15

## 2024-10-17 NOTE — PROGRESS NOTES
Patient presents for assessment of right foot and more specifically right second toe.  Digit remains a problem elevated above the plane of lesser toes.  Patient keeps the toe down when working.  She still has difficulty wearing a steel toed work boot but does okay another style of shoes.    The gabapentin 300 mg at bedtime has been helpful in relieving her discomfort.  A 90-day supply was provided with 1 refill.    Explained to the patient that unless toe was amputated and will continue to need to be taped into a better alignment.  No additional treatment recommended today.  Reappoint 3 months.

## 2024-10-23 ENCOUNTER — CONSULT (OUTPATIENT)
Dept: PAIN MEDICINE | Facility: CLINIC | Age: 54
End: 2024-10-23
Payer: COMMERCIAL

## 2024-10-23 VITALS — BODY MASS INDEX: 33.79 KG/M2 | WEIGHT: 179 LBS | HEIGHT: 61 IN

## 2024-10-23 DIAGNOSIS — M47.817 LUMBOSACRAL SPONDYLOSIS WITHOUT MYELOPATHY: Primary | ICD-10-CM

## 2024-10-23 DIAGNOSIS — M54.16 LUMBAR RADICULITIS: ICD-10-CM

## 2024-10-23 PROCEDURE — 99204 OFFICE O/P NEW MOD 45 MIN: CPT | Performed by: ANESTHESIOLOGY

## 2024-10-23 RX ORDER — METHYLPREDNISOLONE 4 MG/1
TABLET ORAL
Status: CANCELLED | OUTPATIENT
Start: 2024-10-23

## 2024-10-23 NOTE — PROGRESS NOTES
Assessment  1. Lumbosacral spondylosis without myelopathy    2. Lumbar radiculitis      Encounter was conducted with Hebrew ipad interpretor.    Patient presenting with chronic back and radiating buttock pain for greater than 1 year, worsening over the past several months.  Pain is consistent with lumbar radicular pain accompanied by pain 6+/10 on the pain scale with inability to participate in IADLs for >6 weeks. Patient has participated with physical therapy as well as home exercises and stretches.  Patient has tried NSAIDs, gabapentin with modest benefit.    Denies any bowel or bladder incontinence, saddle anesthesia.    In regards to the patient's pathology, we discussed the various treatment options including physical therapy, chiropractic treatment, medication management, activity modifications, interventional spine procedures.     Independently reviewed and interpreted lumbar MRI - this showed degenerative changes most pronounced at L4-5 with mild stenosis ant sublexation of L4 on L5.    Plan:    Discussed and offered L4-5 LESI. She is hesitant to pursue injections, as she states she had prior knee injections with corticosteroids which caused weight gain.    As PT is helping, recommended to continue conservative care at this time. Can consider referral to chiropractic therapy. She will let us know if she decides to proceed with referral to chiropractic therapy or L4-5 LESI in the future.    The procedures, its risks, and benefits were explained to the patient.     Reviewed external notes from PT, PCP, sports medicine offices for review and interpretation of recent and prior relevant medical histories, treatment recommendations, medication and/or interventional treatment responses.    Reviewed hemoglobin A1c, renal function, CBC and/or PT/INR prior to discussing/offering interventional modalities.    [place] Prescription Drug Monitoring Program report was reviewed and was appropriate     My impressions  and treatment recommendations were discussed in detail with the patient who verbalized understanding and had no further questions.  Discharge instructions were provided. I personally saw and examined the patient and I agree with the above discussed plan of care.    History of Present Illness    Sharyn Oakes is a 54 y.o. female presenting for consultation at [facility] for exam and evaluation of chronic back and radiating leg buttock pain for > 3 months. Pain started without any precipitating injury or trauma. Over the past month, the intensity of pain has been Moderate to severe. Pain is currently 6/10. Pain does interfere with age appropriate activities of daily living. Pain is intermittent, with no typical pattern throughout the day. Pain is described as cramping, sharp with numbness. Patient denies weakness in the lower extremities. Assistance device used: None.    Worsening factors noted: standing, bending, sitting, walking.   Improving factors noted: none noted.    Treatments tried:   PT: yes  Chiropractic therapy: no  Injections: no   Previous spine surgery: No    Anticoagulation: no    Medications tried:   NSAIDs, gabapentin    I have personally reviewed and/or updated the patient's past medical history, past surgical history, family history, social history, current medications, allergies, and vital signs today.     Review of Systems   Constitutional:  Negative for chills and fever.   HENT:  Negative for ear pain and sore throat.    Eyes:  Negative for pain and visual disturbance.   Respiratory:  Negative for cough and shortness of breath.    Cardiovascular:  Negative for chest pain and palpitations.   Gastrointestinal:  Negative for abdominal pain and vomiting.   Genitourinary:  Negative for dysuria and hematuria.   Musculoskeletal:  Positive for back pain, gait problem and myalgias. Negative for arthralgias.   Skin:  Negative for color change and rash.   Neurological:  Positive for  weakness. Negative for seizures and syncope.   All other systems reviewed and are negative.      Patient Active Problem List   Diagnosis    Tricompartment osteoarthritis of right knee    Annual physical exam    Chronic pain of left knee    Complex tear of medial meniscus of left knee as current injury    Osteoarthritis of left knee    Patellofemoral disorder of left knee    HPV in female    Essential hypertension    Class 1 obesity due to excess calories without serious comorbidity with body mass index (BMI) of 34.0 to 34.9 in adult    Post-op pain    Pure hypercholesterolemia       Past Medical History:   Diagnosis Date    Arthritis     Bacterial vaginosis     Bilateral bunions     COVID-19     Migraine     Obesity     Varicella        Past Surgical History:   Procedure Laterality Date    SC ARTHRS KNE SURG W/MENISCECTOMY MED/LAT W/SHVG Left 02/12/2020    Procedure: ARTHROSCOPY KNEE partial medial menisectomy;  Surgeon: Abel Suarez MD;  Location:  MAIN OR;  Service: Orthopedics    SC CORRECTION HAMMERTOE Right 12/23/2022    Procedure: REPAIR RIGHT  HAMMERTOE  2nd;  Surgeon: Bin Dominguez DPM;  Location:  MAIN OR;  Service: Podiatry    SC CORRJ HLX VLGS BNCTY SESMDC DSTL METAR OSTEOT Bilateral 12/23/2022    Procedure: BUNIONECTOMY LIZA;  Surgeon: Bin Dominguez DPM;  Location:  MAIN OR;  Service: Podiatry    SC OSTECTOMY PRTL 5TH METAR HEAD SPX Bilateral 12/23/2022    Procedure: BUNIONECTOMY TAILOR;  Surgeon: Bin Dominguez DPM;  Location:  MAIN OR;  Service: Podiatry    SC SYNDACTYLIZATION TOES Right 2/16/2024    Procedure: PLANTAR PLATE REPAIR WITH CAPSULORRAPHY;  Surgeon: Bin Dominguez DPM;  [facility];  Service: Podiatry       Family History   Problem Relation Age of Onset    Hypertension Mother     Prostate cancer Father         unknown age    Cancer Father     No Known Problems Sister     No Known Problems Daughter     No Known Problems Maternal Grandmother     No Known Problems  Maternal Grandfather     No Known Problems Paternal Grandmother     No Known Problems Paternal Grandfather     No Known Problems Maternal Aunt     No Known Problems Paternal Aunt        Social History     Occupational History    Not on file   Tobacco Use    Smoking status: Never    Smokeless tobacco: Never   Vaping Use    Vaping status: Never Used   Substance and Sexual Activity    Alcohol use: Yes     Alcohol/week: 1.0 standard drink of alcohol     Types: 1 Cans of beer per week     Comment: rare    Drug use: Never    Sexual activity: Yes     Partners: Male       Current Outpatient Medications on File Prior to Visit   Medication Sig    Aspirin-Acetaminophen-Caffeine (EXCEDRIN PO) Take 1 tablet by mouth as needed    Biotin 10 MG CAPS Take 1 capsule by mouth daily    buPROPion (WELLBUTRIN XL) 150 mg 24 hr tablet Take 150 mg by mouth every morning    COLLAGEN PO Take 1 Scoop by mouth daily    Cyanocobalamin (VITAMIN B 12 PO) Take 1 tablet by mouth every other day    dexamethasone sodium phosphate 0.1 % ophthalmic solution INSTILL 1 DROP INTO BOTH EYES TWICE A DAY FOR 2 WEEKS    gabapentin (NEURONTIN) 300 mg capsule Take 1 capsule (300 mg total) by mouth daily at bedtime    gabapentin (Neurontin) 300 mg capsule Take 1 capsule (300 mg total) by mouth daily at bedtime    gabapentin (Neurontin) 300 mg capsule Take 1 capsule (300 mg total) by mouth daily at bedtime    ibuprofen (MOTRIN) 600 mg tablet Take 600 mg by mouth every 6 (six) hours as needed    losartan (COZAAR) 25 mg tablet TAKE 1 TABLET (25 MG TOTAL) BY MOUTH DAILY.    Misc Natural Products (APPLE CIDER VINEGAR DIET PO) Take 1 capsule by mouth daily    naproxen (NAPROSYN) 500 mg tablet TAKE 1 TABLET BY MOUTH TWICE A DAY WITH FOOD    Omega-3 1000 MG CAPS Take 1 capsule by mouth daily    Restasis 0.05 % ophthalmic emulsion instill 1 drop into both eyes twice a day    triamcinolone (KENALOG) 0.1 % ointment Apply 1 Application topically 2 (two) times a day To  "affected area    VITAMIN A PO Take 1 capsule by mouth every other day    VITAMIN D PO Take 1 tablet by mouth in the morning    Xiidra 5 % op solution instill 1 drop into both eyes twice a day     No current facility-administered medications on file prior to visit.       Allergies   Allergen Reactions    Penicillins Anaphylaxis       Physical Exam    Ht 5' 1\" (1.549 m)   Wt 81.2 kg (179 lb)   BMI 33.82 kg/m²     Constitutional: normal, well developed, well nourished, alert, in no distress and non-toxic and no overt pain behavior.  Eyes: anicteric  HEENT: grossly intact  Neck: supple, symmetric, trachea midline and no masses   Pulmonary:even and unlabored  Cardiovascular:No edema or pitting edema present  Skin:Normal without rashes or lesions and well hydrated  Psychiatric:Mood and affect appropriate  Neurologic: Motor function is grossly intact with no focal neurologic deficits   Musculoskeletal: nonantalgic gait    Imaging  MRI lumbar spine  FINDINGS:     VERTEBRAL BODIES:  There are 5 lumbar type vertebral bodies. Slight anterior spondylolisthesis of L4 upon L5. No compression fracture. No scoliosis. Normal marrow signal is identified within the visualized bony structures.  No discrete marrow lesion.     SACRUM:  Normal signal within the sacrum. No evidence of insufficiency or stress fracture.     DISTAL CORD AND CONUS:  Normal size and signal within the distal cord and conus.     PARASPINAL SOFT TISSUES:  Paraspinal soft tissues are unremarkable.     LOWER THORACIC DISC SPACES:  Normal disc height and signal.  No disc herniation, canal stenosis or foraminal narrowing.     LUMBAR DISC SPACES:     L1-L2: Tiny central disc herniation with minimal annular bulging. No canal stenosis or foraminal narrowing.     L2-L3: Normal disc height and signal. No disc herniation, canal stenosis or foraminal narrowing.     L3-L4: Minor annular bulging with a very small focal right foraminal disc protrusion. No canal stenosis or " foraminal narrowing.     L4-L5: Slight anterior subluxation of L4 upon L5 with mild annular bulging. Mild bilateral facet arthropathy with a small left facet effusion. Mild tricompartmental canal stenosis with narrowing of the anterior lateral recesses of the thecal sac. No   foraminal nerve impingement.     L5-S1: No disc herniation, canal stenosis or foraminal narrowing.     OTHER FINDINGS: Parapelvic cysts are noted within the left kidney.     IMPRESSION:     Mild noncompressive lumbar degenerative change most pronounced at L4-5 where there is annular bulging and facet degenerative change.

## 2024-11-22 ENCOUNTER — HOSPITAL ENCOUNTER (OUTPATIENT)
Dept: RADIOLOGY | Age: 54
Discharge: HOME | End: 2024-11-22
Payer: COMMERCIAL

## 2024-11-22 VITALS — HEIGHT: 61 IN | BODY MASS INDEX: 33.79 KG/M2 | WEIGHT: 179 LBS

## 2024-11-22 DIAGNOSIS — Z12.31 ENCOUNTER FOR SCREENING MAMMOGRAM FOR BREAST CANCER: ICD-10-CM

## 2024-11-22 PROCEDURE — 77063 BREAST TOMOSYNTHESIS BI: CPT

## 2024-11-22 PROCEDURE — 77067 SCR MAMMO BI INCL CAD: CPT

## 2024-11-27 ENCOUNTER — RESULTS FOLLOW-UP (OUTPATIENT)
Dept: FAMILY MEDICINE CLINIC | Facility: CLINIC | Age: 54
End: 2024-11-27

## 2024-12-20 ENCOUNTER — OFFICE VISIT (OUTPATIENT)
Dept: URGENT CARE | Age: 54
End: 2024-12-20
Payer: COMMERCIAL

## 2024-12-20 VITALS
HEART RATE: 97 BPM | SYSTOLIC BLOOD PRESSURE: 138 MMHG | RESPIRATION RATE: 18 BRPM | TEMPERATURE: 98.7 F | OXYGEN SATURATION: 99 % | DIASTOLIC BLOOD PRESSURE: 88 MMHG

## 2024-12-20 DIAGNOSIS — J40 BRONCHITIS: Primary | ICD-10-CM

## 2024-12-20 DIAGNOSIS — R05.1 ACUTE COUGH: ICD-10-CM

## 2024-12-20 PROCEDURE — 99213 OFFICE O/P EST LOW 20 MIN: CPT

## 2024-12-20 RX ORDER — BENZONATATE 200 MG/1
200 CAPSULE ORAL 3 TIMES DAILY PRN
Qty: 20 CAPSULE | Refills: 0 | Status: SHIPPED | OUTPATIENT
Start: 2024-12-20

## 2024-12-20 RX ORDER — AZITHROMYCIN 250 MG/1
TABLET, FILM COATED ORAL
Qty: 6 TABLET | Refills: 0 | Status: SHIPPED | OUTPATIENT
Start: 2024-12-20 | End: 2024-12-24

## 2024-12-20 RX ORDER — METHYLPREDNISOLONE 4 MG/1
TABLET ORAL
Qty: 21 TABLET | Refills: 0 | Status: SHIPPED | OUTPATIENT
Start: 2024-12-20

## 2024-12-20 NOTE — PROGRESS NOTES
[facility]        NAME: Sharyn Oakes is a 54 y.o. female  : 1970    MRN: [id number]  DATE: 2024  TIME: 5:32 PM    Assessment and Plan   No primary diagnosis found.  No diagnosis found.      Patient Instructions       Follow up with PCP in 3-5 days.  Proceed to  ER if symptoms worsen.    If tests have been performed at [facility], our office will contact you with results if changes need to be made to the care plan discussed with you at the visit.  [facility]    Chief Complaint     Chief Complaint   Patient presents with   • Cold Like Symptoms     For about two weeks   Cough, congestion, runny nose, feels short of breath when has coughing fits  Has had a few coughing fits that have caused her to vomit         History of Present Illness       HPI    Review of Systems   Review of Systems      Current Medications       Current Outpatient Medications:   •  Aspirin-Acetaminophen-Caffeine (EXCEDRIN PO), Take 1 tablet by mouth as needed, Disp: , Rfl:   •  Biotin 10 MG CAPS, Take 1 capsule by mouth daily, Disp: , Rfl:   •  buPROPion (WELLBUTRIN XL) 150 mg 24 hr tablet, Take 150 mg by mouth every morning, Disp: , Rfl:   •  COLLAGEN PO, Take 1 Scoop by mouth daily, Disp: , Rfl:   •  Cyanocobalamin (VITAMIN B 12 PO), Take 1 tablet by mouth every other day, Disp: , Rfl:   •  dexamethasone sodium phosphate 0.1 % ophthalmic solution, INSTILL 1 DROP INTO BOTH EYES TWICE A DAY FOR 2 WEEKS, Disp: , Rfl:   •  gabapentin (Neurontin) 300 mg capsule, Take 1 capsule (300 mg total) by mouth daily at bedtime, Disp: 90 capsule, Rfl: 1  •  ibuprofen (MOTRIN) 600 mg tablet, Take 600 mg by mouth every 6 (six) hours as needed, Disp: , Rfl:   •  losartan (COZAAR) 25 mg tablet, TAKE 1 TABLET (25 MG TOTAL) BY MOUTH DAILY., Disp: 90 tablet, Rfl: 2  •  Misc Natural Products (APPLE CIDER VINEGAR DIET PO), Take 1 capsule by mouth daily, Disp: , Rfl:   •  naproxen (NAPROSYN) 500 mg  tablet, TAKE 1 TABLET BY MOUTH TWICE A DAY WITH FOOD, Disp: 60 tablet, Rfl: 5  •  Omega-3 1000 MG CAPS, Take 1 capsule by mouth daily, Disp: , Rfl:   •  Restasis 0.05 % ophthalmic emulsion, instill 1 drop into both eyes twice a day, Disp: , Rfl:   •  triamcinolone (KENALOG) 0.1 % ointment, Apply 1 Application topically 2 (two) times a day To affected area, Disp: , Rfl:   •  VITAMIN A PO, Take 1 capsule by mouth every other day, Disp: , Rfl:   •  VITAMIN D PO, Take 1 tablet by mouth in the morning, Disp: , Rfl:   •  Xiidra 5 % op solution, instill 1 drop into both eyes twice a day, Disp: , Rfl:   •  gabapentin (NEURONTIN) 300 mg capsule, Take 1 capsule (300 mg total) by mouth daily at bedtime, Disp: 30 capsule, Rfl: 0  •  gabapentin (Neurontin) 300 mg capsule, Take 1 capsule (300 mg total) by mouth daily at bedtime, Disp: 90 capsule, Rfl: 0    Current Allergies     Allergies as of 12/20/2024 - Reviewed 12/20/2024   Allergen Reaction Noted   • Penicillins Anaphylaxis 06/25/2017            The following portions of the patient's history were reviewed and updated as appropriate: allergies, current medications, past family history, past medical history, past social history, past surgical history and problem list.     Past Medical History:   Diagnosis Date   • Arthritis    • Bacterial vaginosis    • Bilateral bunions    • COVID-19    • Migraine    • Obesity    • Varicella        Past Surgical History:   Procedure Laterality Date   • MD ARTHRS KNE SURG W/MENISCECTOMY MED/LAT W/SHVG Left 02/12/2020    Procedure: ARTHROSCOPY KNEE partial medial menisectomy;  Surgeon: Abel Suarez MD;  Location:  MAIN OR;  Service: Orthopedics   • MD CORRECTION HAMMERTOE Right 12/23/2022    Procedure: REPAIR RIGHT  HAMMERTOE  2nd;  Surgeon: Bin Dominguez DPM;  Location:  MAIN OR;  Service: Podiatry   • MD CORRJ HLX VLGS BNCTY [facility] DSTL METAR OSTEOT Bilateral 12/23/2022    Procedure: BUNIONECTOMY LIZA;  Surgeon: Bin Dominguez  TRISTEN;  Location:  MAIN OR;  Service: Podiatry   • AK OSTECTOMY PRTL 5TH METAR HEAD SPX Bilateral 12/23/2022    Procedure: BUNIONECTOMY TAILOR;  Surgeon: Bin Dominguez DPM;  Location:  MAIN OR;  Service: Podiatry   • AK SYNDACTYLIZATION TOES Right 2/16/2024    Procedure: PLANTAR PLATE REPAIR WITH CAPSULORRAPHY;  Surgeon: Bin Dominguez DPM;  [facility];  Service: Podiatry       Family History   Problem Relation Age of Onset   • Hypertension Mother    • Prostate cancer Father         unknown age   • Cancer Father    • No Known Problems Sister    • No Known Problems Daughter    • No Known Problems Maternal Grandmother    • No Known Problems Maternal Grandfather    • No Known Problems Paternal Grandmother    • No Known Problems Paternal Grandfather    • No Known Problems Maternal Aunt    • No Known Problems Paternal Aunt          Medications have been verified.        Objective   /96   Pulse 97   Temp 98.7 °F (37.1 °C)   Resp 18   SpO2 99%   No LMP recorded. Patient is postmenopausal.       Physical Exam     Physical Exam

## 2024-12-20 NOTE — PROGRESS NOTES
[facility]        NAME: Sharyn Oakes is a 54 y.o. female  : 1970    MRN: [id number]  DATE: 2024  TIME: 6:10 PM    Assessment and Plan   Bronchitis [J40]  1. Bronchitis  azithromycin (ZITHROMAX) 250 mg tablet    benzonatate (TESSALON) 200 MG capsule    methylPREDNISolone 4 MG tablet therapy pack      2. Acute cough  azithromycin (ZITHROMAX) 250 mg tablet    benzonatate (TESSALON) 200 MG capsule    methylPREDNISolone 4 MG tablet therapy pack        Ongoing cough, congestion for 2 weeks. No fevers. Tightness at times. BS clear. Discussed treatment and symptom managemen    Patient Instructions       Follow up with PCP in 3-5 days.  Proceed to  ER if symptoms worsen.    If tests have been performed at [facility], our office will contact you with results if changes need to be made to the care plan discussed with you at the visit.  You can review your full results on [facility].    Chief Complaint     Chief Complaint   Patient presents with    Cold Like Symptoms     For about two weeks   Cough, congestion, runny nose, feels short of breath when has coughing fits  Has had a few coughing fits that have caused her to vomit         History of Present Illness       * used*  Ongoing cough, congestion for 2 weeks. No fevers. Tightness at times. BS clear. Discussed treatment and symptom management        Review of Systems   Review of Systems   Constitutional:  Positive for activity change. Negative for fever.   HENT:  Positive for congestion and postnasal drip.    Respiratory:  Positive for cough and chest tightness. Negative for shortness of breath and wheezing.          Current Medications       Current Outpatient Medications:     Aspirin-Acetaminophen-Caffeine (EXCEDRIN PO), Take 1 tablet by mouth as needed, Disp: , Rfl:     azithromycin (ZITHROMAX) 250 mg tablet, Take 2 tablets today then 1 tablet daily x 4 days, Disp: 6 tablet, Rfl: 0    benzonatate (TESSALON) 200 MG capsule,  Take 1 capsule (200 mg total) by mouth 3 (three) times a day as needed for cough, Disp: 20 capsule, Rfl: 0    Biotin 10 MG CAPS, Take 1 capsule by mouth daily, Disp: , Rfl:     buPROPion (WELLBUTRIN XL) 150 mg 24 hr tablet, Take 150 mg by mouth every morning, Disp: , Rfl:     COLLAGEN PO, Take 1 Scoop by mouth daily, Disp: , Rfl:     Cyanocobalamin (VITAMIN B 12 PO), Take 1 tablet by mouth every other day, Disp: , Rfl:     dexamethasone sodium phosphate 0.1 % ophthalmic solution, INSTILL 1 DROP INTO BOTH EYES TWICE A DAY FOR 2 WEEKS, Disp: , Rfl:     gabapentin (Neurontin) 300 mg capsule, Take 1 capsule (300 mg total) by mouth daily at bedtime, Disp: 90 capsule, Rfl: 1    ibuprofen (MOTRIN) 600 mg tablet, Take 600 mg by mouth every 6 (six) hours as needed, Disp: , Rfl:     losartan (COZAAR) 25 mg tablet, TAKE 1 TABLET (25 MG TOTAL) BY MOUTH DAILY., Disp: 90 tablet, Rfl: 2    methylPREDNISolone 4 MG tablet therapy pack, Use as directed on package, Disp: 21 tablet, Rfl: 0    Misc Natural Products (APPLE CIDER VINEGAR DIET PO), Take 1 capsule by mouth daily, Disp: , Rfl:     naproxen (NAPROSYN) 500 mg tablet, TAKE 1 TABLET BY MOUTH TWICE A DAY WITH FOOD, Disp: 60 tablet, Rfl: 5    Omega-3 1000 MG CAPS, Take 1 capsule by mouth daily, Disp: , Rfl:     Restasis 0.05 % ophthalmic emulsion, instill 1 drop into both eyes twice a day, Disp: , Rfl:     triamcinolone (KENALOG) 0.1 % ointment, Apply 1 Application topically 2 (two) times a day To affected area, Disp: , Rfl:     VITAMIN A PO, Take 1 capsule by mouth every other day, Disp: , Rfl:     VITAMIN D PO, Take 1 tablet by mouth in the morning, Disp: , Rfl:     Xiidra 5 % op solution, instill 1 drop into both eyes twice a day, Disp: , Rfl:     gabapentin (NEURONTIN) 300 mg capsule, Take 1 capsule (300 mg total) by mouth daily at bedtime, Disp: 30 capsule, Rfl: 0    gabapentin (Neurontin) 300 mg capsule, Take 1 capsule (300 mg total) by mouth daily at bedtime, Disp: 90  capsule, Rfl: 0    Current Allergies     Allergies as of 12/20/2024 - Reviewed 12/20/2024   Allergen Reaction Noted    Penicillins Anaphylaxis 06/25/2017            The following portions of the patient's history were reviewed and updated as appropriate: allergies, current medications, past family history, past medical history, past social history, past surgical history and problem list.     Past Medical History:   Diagnosis Date    Arthritis     Bacterial vaginosis     Bilateral bunions     COVID-19     Migraine     Obesity     Varicella        Past Surgical History:   Procedure Laterality Date    RI ARTHRS KNE SURG W/MENISCECTOMY MED/LAT W/SHVG Left 02/12/2020    Procedure: ARTHROSCOPY KNEE partial medial menisectomy;  Surgeon: Abel Suarez MD;  Location:  MAIN OR;  Service: Orthopedics    RI CORRECTION HAMMERTOE Right 12/23/2022    Procedure: REPAIR RIGHT  HAMMERTOE  2nd;  Surgeon: Bin Dominguez DPM;  Location:  MAIN OR;  Service: Podiatry    RI CORRJ HLX VLGS BNCTY SESMDC DSTL METAR OSTEOT Bilateral 12/23/2022    Procedure: BUNIONECTOMY LIZA;  Surgeon: Bin Dominguez DPM;  Location:  MAIN OR;  Service: Podiatry    RI OSTECTOMY PRTL 5TH METAR HEAD SPX Bilateral 12/23/2022    Procedure: BUNIONECTOMY TAILOR;  Surgeon: Bin Dominguez DPM;  Location:  MAIN OR;  Service: Podiatry    RI SYNDACTYLIZATION TOES Right 2/16/2024    Procedure: PLANTAR PLATE REPAIR WITH CAPSULORRAPHY;  Surgeon: Bin Dominguez DPM;  [facility];  Service: Podiatry       Family History   Problem Relation Age of Onset    Hypertension Mother     Prostate cancer Father         unknown age    Cancer Father     No Known Problems Sister     No Known Problems Daughter     No Known Problems Maternal Grandmother     No Known Problems Maternal Grandfather     No Known Problems Paternal Grandmother     No Known Problems Paternal Grandfather     No Known Problems Maternal Aunt     No Known Problems Paternal Aunt           Medications have been verified.        Objective   /88   Pulse 97   Temp 98.7 °F (37.1 °C)   Resp 18   SpO2 99%   No LMP recorded. Patient is postmenopausal.       Physical Exam     Physical Exam  Vitals reviewed.   Constitutional:       Appearance: Normal appearance.   HENT:      Right Ear: Tympanic membrane normal.      Left Ear: Tympanic membrane normal.      Nose: Congestion present.   Cardiovascular:      Rate and Rhythm: Normal rate and regular rhythm.      Pulses: Normal pulses.      Heart sounds: Normal heart sounds.   Pulmonary:      Effort: Pulmonary effort is normal. No respiratory distress.      Breath sounds: Normal breath sounds.   Lymphadenopathy:      Cervical: Cervical adenopathy present.   Neurological:      Mental Status: She is alert.

## 2025-01-16 ENCOUNTER — OFFICE VISIT (OUTPATIENT)
Dept: PODIATRY | Facility: CLINIC | Age: 55
End: 2025-01-16
Payer: COMMERCIAL

## 2025-01-16 DIAGNOSIS — M17.12 PRIMARY OSTEOARTHRITIS OF LEFT KNEE: ICD-10-CM

## 2025-01-16 DIAGNOSIS — G62.9 PERIPHERAL NERVE DISORDER: Primary | ICD-10-CM

## 2025-01-16 DIAGNOSIS — M20.41 HAMMER TOE OF RIGHT FOOT: ICD-10-CM

## 2025-01-16 PROCEDURE — 99213 OFFICE O/P EST LOW 20 MIN: CPT | Performed by: PODIATRIST

## 2025-01-16 RX ORDER — GABAPENTIN 300 MG/1
300 CAPSULE ORAL
Qty: 90 CAPSULE | Refills: 1 | Status: SHIPPED | OUTPATIENT
Start: 2025-01-16 | End: 2025-07-15

## 2025-01-16 RX ORDER — NAPROXEN 500 MG/1
500 TABLET ORAL 2 TIMES DAILY WITH MEALS
Qty: 60 TABLET | Refills: 5 | Status: SHIPPED | OUTPATIENT
Start: 2025-01-16

## 2025-01-16 NOTE — PROGRESS NOTES
Patient presents for assessment of right second toe.  Patient notes that she does take the gabapentin 300 mg at bedtime but every other day as it leads to drowsiness.  She typically takes it 1/2-hour before bedtime.  No other adverse effects.    She states that the medication is helpful but she still has joint pain due to the dislocated hammertoe deformity right second toe at the level of the MPJ.    Explained to patient that amputation is needed for resolution but patient is disinterested.  Advised her to continue with gabapentin but to take it nightly at 6 PM.  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  Reappoint 6 months.

## 2025-01-22 ENCOUNTER — TELEPHONE (OUTPATIENT)
Age: 55
End: 2025-01-22

## 2025-01-22 NOTE — TELEPHONE ENCOUNTER
Pt requesting a Wednesday appt next Wednesday with Dr Benson is beginning of March as of right now and that's too far away.  Can you fit her in somewhere.?  Speaks [place] just wants Dr Benson    Cancelled the 1/29 appt can't make it that Wednesday wanted 2/5 if possible.

## 2025-01-30 NOTE — ASSESSMENT & PLAN NOTE
Last OV 12/3/2024  Next OV 6/3/2025      Requested Prescriptions     Pending Prescriptions Disp Refills    traZODone (DESYREL) 100 MG tablet [Pharmacy Med Name: traZODone HCl 100 MG Oral Tablet] 90 tablet 0     Sig: TAKE 1 TABLET BY MOUTH NIGHTLY AS NEEDED FOR SLEEP      · Dexa 5/27/22 and normal  Menopause was at 43or 35years old  · Colonocopy 6/2020 that did not show precancerous polyps  Due in 2030  · Mammogram due after in July  · She is UTD on pap  Has a gynecologist  Jennifer Cifuentes  · Blood work ordered  · Fu in 1 year

## 2025-02-01 ENCOUNTER — APPOINTMENT (OUTPATIENT)
Dept: LAB | Age: 55
End: 2025-02-01
Payer: COMMERCIAL

## 2025-02-01 DIAGNOSIS — E66.09 CLASS 1 OBESITY DUE TO EXCESS CALORIES WITHOUT SERIOUS COMORBIDITY WITH BODY MASS INDEX (BMI) OF 34.0 TO 34.9 IN ADULT: ICD-10-CM

## 2025-02-01 DIAGNOSIS — E66.811 CLASS 1 OBESITY DUE TO EXCESS CALORIES WITHOUT SERIOUS COMORBIDITY WITH BODY MASS INDEX (BMI) OF 34.0 TO 34.9 IN ADULT: ICD-10-CM

## 2025-02-01 DIAGNOSIS — E78.00 PURE HYPERCHOLESTEROLEMIA: ICD-10-CM

## 2025-02-01 DIAGNOSIS — I10 ESSENTIAL HYPERTENSION: ICD-10-CM

## 2025-02-01 LAB
ALBUMIN SERPL BCG-MCNC: 4.2 G/DL (ref 3.5–5)
ALP SERPL-CCNC: 98 U/L (ref 34–104)
ALT SERPL W P-5'-P-CCNC: 20 U/L (ref 7–52)
ANION GAP SERPL CALCULATED.3IONS-SCNC: 9 MMOL/L (ref 4–13)
AST SERPL W P-5'-P-CCNC: 19 U/L (ref 13–39)
BACTERIA UR QL AUTO: ABNORMAL /HPF
BASOPHILS # BLD AUTO: 0.05 THOUSANDS/ΜL (ref 0–0.1)
BASOPHILS NFR BLD AUTO: 1 % (ref 0–1)
BILIRUB SERPL-MCNC: 0.4 MG/DL (ref 0.2–1)
BILIRUB UR QL STRIP: NEGATIVE
BUN SERPL-MCNC: 21 MG/DL (ref 5–25)
CALCIUM SERPL-MCNC: 9.7 MG/DL (ref 8.4–10.2)
CHLORIDE SERPL-SCNC: 104 MMOL/L (ref 96–108)
CHOLEST SERPL-MCNC: 202 MG/DL (ref ?–200)
CLARITY UR: CLEAR
CO2 SERPL-SCNC: 30 MMOL/L (ref 21–32)
COLOR UR: YELLOW
CREAT SERPL-MCNC: 0.66 MG/DL (ref 0.6–1.3)
EOSINOPHIL # BLD AUTO: 0.12 THOUSAND/ΜL (ref 0–0.61)
EOSINOPHIL NFR BLD AUTO: 1 % (ref 0–6)
ERYTHROCYTE [DISTWIDTH] IN BLOOD BY AUTOMATED COUNT: 13.1 % (ref 11.6–15.1)
EST. AVERAGE GLUCOSE BLD GHB EST-MCNC: 114 MG/DL
GFR SERPL CREATININE-BSD FRML MDRD: 99 ML/MIN/1.73SQ M
GLUCOSE P FAST SERPL-MCNC: 107 MG/DL (ref 65–99)
GLUCOSE UR STRIP-MCNC: NEGATIVE MG/DL
HBA1C MFR BLD: 5.6 %
HCT VFR BLD AUTO: 47.1 % (ref 34.8–46.1)
HDLC SERPL-MCNC: 53 MG/DL
HGB BLD-MCNC: 15 G/DL (ref 11.5–15.4)
HGB UR QL STRIP.AUTO: NEGATIVE
HYALINE CASTS #/AREA URNS LPF: ABNORMAL /LPF
IMM GRANULOCYTES # BLD AUTO: 0.02 THOUSAND/UL (ref 0–0.2)
IMM GRANULOCYTES NFR BLD AUTO: 0 % (ref 0–2)
KETONES UR STRIP-MCNC: NEGATIVE MG/DL
LDLC SERPL CALC-MCNC: 127 MG/DL (ref 0–100)
LEUKOCYTE ESTERASE UR QL STRIP: NEGATIVE
LYMPHOCYTES # BLD AUTO: 2.78 THOUSANDS/ΜL (ref 0.6–4.47)
LYMPHOCYTES NFR BLD AUTO: 29 % (ref 14–44)
MCH RBC QN AUTO: 29.4 PG (ref 26.8–34.3)
MCHC RBC AUTO-ENTMCNC: 31.8 G/DL (ref 31.4–37.4)
MCV RBC AUTO: 92 FL (ref 82–98)
MONOCYTES # BLD AUTO: 0.52 THOUSAND/ΜL (ref 0.17–1.22)
MONOCYTES NFR BLD AUTO: 5 % (ref 4–12)
MUCOUS THREADS UR QL AUTO: ABNORMAL
NEUTROPHILS # BLD AUTO: 6.14 THOUSANDS/ΜL (ref 1.85–7.62)
NEUTS SEG NFR BLD AUTO: 64 % (ref 43–75)
NITRITE UR QL STRIP: NEGATIVE
NON-SQ EPI CELLS URNS QL MICRO: ABNORMAL /HPF
NONHDLC SERPL-MCNC: 149 MG/DL
NRBC BLD AUTO-RTO: 0 /100 WBCS
PH UR STRIP.AUTO: 6.5 [PH]
PLATELET # BLD AUTO: 302 THOUSANDS/UL (ref 149–390)
PMV BLD AUTO: 10.1 FL (ref 8.9–12.7)
POTASSIUM SERPL-SCNC: 5.1 MMOL/L (ref 3.5–5.3)
PROT SERPL-MCNC: 7.3 G/DL (ref 6.4–8.4)
PROT UR STRIP-MCNC: ABNORMAL MG/DL
RBC # BLD AUTO: 5.1 MILLION/UL (ref 3.81–5.12)
RBC #/AREA URNS AUTO: ABNORMAL /HPF
SODIUM SERPL-SCNC: 143 MMOL/L (ref 135–147)
SP GR UR STRIP.AUTO: 1.02 (ref 1–1.03)
TRIGL SERPL-MCNC: 110 MG/DL (ref ?–150)
TSH SERPL DL<=0.05 MIU/L-ACNC: 0.8 UIU/ML (ref 0.45–4.5)
UROBILINOGEN UR STRIP-ACNC: <2 MG/DL
WBC # BLD AUTO: 9.63 THOUSAND/UL (ref 4.31–10.16)
WBC #/AREA URNS AUTO: ABNORMAL /HPF

## 2025-02-01 PROCEDURE — 84443 ASSAY THYROID STIM HORMONE: CPT

## 2025-02-01 PROCEDURE — 36415 COLL VENOUS BLD VENIPUNCTURE: CPT

## 2025-02-01 PROCEDURE — 85025 COMPLETE CBC W/AUTO DIFF WBC: CPT

## 2025-02-01 PROCEDURE — 83036 HEMOGLOBIN GLYCOSYLATED A1C: CPT

## 2025-02-01 PROCEDURE — 80053 COMPREHEN METABOLIC PANEL: CPT

## 2025-02-01 PROCEDURE — 81001 URINALYSIS AUTO W/SCOPE: CPT

## 2025-02-01 PROCEDURE — 80061 LIPID PANEL: CPT

## 2025-02-05 ENCOUNTER — OFFICE VISIT (OUTPATIENT)
Dept: FAMILY MEDICINE CLINIC | Facility: CLINIC | Age: 55
End: 2025-02-05
Payer: COMMERCIAL

## 2025-02-05 VITALS
RESPIRATION RATE: 16 BRPM | OXYGEN SATURATION: 96 % | TEMPERATURE: 97.5 F | SYSTOLIC BLOOD PRESSURE: 140 MMHG | WEIGHT: 171.2 LBS | BODY MASS INDEX: 32.32 KG/M2 | HEIGHT: 61 IN | HEART RATE: 92 BPM | DIASTOLIC BLOOD PRESSURE: 90 MMHG

## 2025-02-05 DIAGNOSIS — E66.09 CLASS 1 OBESITY DUE TO EXCESS CALORIES WITHOUT SERIOUS COMORBIDITY WITH BODY MASS INDEX (BMI) OF 32.0 TO 32.9 IN ADULT: ICD-10-CM

## 2025-02-05 DIAGNOSIS — I10 ESSENTIAL HYPERTENSION: Primary | ICD-10-CM

## 2025-02-05 DIAGNOSIS — Z13.1 SCREENING FOR DIABETES MELLITUS: ICD-10-CM

## 2025-02-05 DIAGNOSIS — R06.83 SNORES: ICD-10-CM

## 2025-02-05 DIAGNOSIS — R19.4 BOWEL HABIT CHANGES: ICD-10-CM

## 2025-02-05 DIAGNOSIS — E78.00 PURE HYPERCHOLESTEROLEMIA: ICD-10-CM

## 2025-02-05 DIAGNOSIS — E66.811 CLASS 1 OBESITY DUE TO EXCESS CALORIES WITHOUT SERIOUS COMORBIDITY WITH BODY MASS INDEX (BMI) OF 32.0 TO 32.9 IN ADULT: ICD-10-CM

## 2025-02-05 DIAGNOSIS — Z12.4 CERVICAL CANCER SCREENING: ICD-10-CM

## 2025-02-05 DIAGNOSIS — R31.29 MICROSCOPIC HEMATURIA: ICD-10-CM

## 2025-02-05 PROCEDURE — 99214 OFFICE O/P EST MOD 30 MIN: CPT | Performed by: FAMILY MEDICINE

## 2025-02-05 RX ORDER — CYCLOSPORINE 0 G/ML
SOLUTION/ DROPS OPHTHALMIC; TOPICAL
COMMUNITY
Start: 2024-12-03

## 2025-02-05 NOTE — ASSESSMENT & PLAN NOTE
Slightly elevated secondary to her anxiety.  When I rechecked it was 140/90.  And her heart rate did decrease.  Patient denies any acute cardiovascular or neuro or pulmonary symptoms from it.  Advised patient to take her medication every day.  Low-sodium diet.  Hydrate well.  Lose weight with a better diet and exercise.  And patient is referred to sleep medicine to be checked for sleep apnea as well; see below.  Orders:  •  CBC and differential; Future  •  Comprehensive metabolic panel; Future

## 2025-02-05 NOTE — PROGRESS NOTES
Name: Sharyn Oakes      : 1970      MRN: [id number]  Encounter Provider: Stuart Benson MD  Encounter Date: 2025   Encounter department: [facility]  :  Assessment & Plan  Essential hypertension  Slightly elevated secondary to her anxiety.  When I rechecked it was 140/90.  And her heart rate did decrease.  Patient denies any acute cardiovascular or neuro or pulmonary symptoms from it.  Advised patient to take her medication every day.  Low-sodium diet.  Hydrate well.  Lose weight with a better diet and exercise.  And patient is referred to sleep medicine to be checked for sleep apnea as well; see below.  Orders:  •  CBC and differential; Future  •  Comprehensive metabolic panel; Future    Pure hypercholesterolemia  Controlled.  But slightly elevated.  LFTs normal.  Total cholesterol went up to 202 now when it was 183 in  of last year, triglycerides went up to 110 from 92, HDL went up to 53 from 48 and her LDL went up to 127 from 117.  Advised patient to cut down on her fats starches and sweets.  Lose weight with a better diet and exercise.  Patient does admit to eating a lot of cheese and salt.  Patient appropriately advised.  Recheck labs a week before her next visit with me in 5 months  Orders:  •  Comprehensive metabolic panel; Future  •  Lipid panel; Future    Microscopic hematuria  Asymptomatic.  Discussed with patient.  Patient advised to see her gynecologist.  Patient is also due for her annual/Pap.  Recheck her urine with her next labs also.  Orders:  •  Ambulatory Referral to Obstetrics / Gynecology; Future  •  UA w Reflex to Microscopic w Reflex to Culture; Future    Snores  Discussed with patient.  Patient was advised to get checked out for sleep apnea by her optometrist as well.  Advised patient to lose weight.  And patient referred to sleep medicine to be checked for sleep apnea.  Orders:  •  Ambulatory Referral to Sleep Medicine; Future    Bowel habit  changes  Likely secondary to her recent stress and anxiety.  Discussed with patient.  Patient briefly counseled.  Patient denies SI HI.  And patient is referred to GI to be evaluated also.  Orders:  •  Ambulatory Referral to Gastroenterology; Future    Class 1 obesity due to excess calories without serious comorbidity with body mass index (BMI) of 32.0 to 32.9 in adult    BMI of 32.3.  Advised patient to lose weight with a better diet and exercise.  Patient admits to poor diet and not exercising.  Patient appropriately advised.  Continue to monitor and follow-up.  Of note her A1c went up to 5.6 now when it was 5.4 in June of last year.  And patient was given appropriate advice regarding that also.       Cervical cancer screening  Patient sent to the GYN for her annual/Pap.  Orders:  •  Ambulatory Referral to Obstetrics / Gynecology; Future    Screening for diabetes mellitus  Her A1c now is 5.6 when it was 5.4.  Patient advised to cut down on her starches sweets and fats.  Lose weight with a better diet and exercise.  And I will recheck it again with the next blood work for physical.  Orders:  •  Hemoglobin A1C; Future            RTO 5 months for her annual physical and do the blood work and urine before.  Patient can see me prior to that for anything else in between.         History of Present Illness   54-year-old female here to be evaluated for her hypertension, hyperlipidemia and hyperglycemia.  Patient did blood work and urine recently.  Patient also saw her optometrist recently.  They are treating her for keratitis.  She was also advised to get checked for sleep apnea.  Patient does admit to snoring.  Patient is also complaining of some recent bowel movement changes.  Admits to some anxiety.  Denies SI HI.  Patient is due for her GYN/Pap.  Patient denies tobacco.  Patient declined the flu vaccine.  She did receive both of her Shingrix vaccines without incident.      Review of Systems   Constitutional:  Negative  "for chills, fatigue, fever and unexpected weight change.   HENT:  Negative for congestion and sore throat.    Eyes:  Negative for visual disturbance.   Respiratory:  Negative for cough and shortness of breath.    Cardiovascular:  Negative for chest pain and palpitations.   Gastrointestinal:  Positive for diarrhea. Negative for abdominal pain, blood in stool, constipation, nausea and vomiting.   Genitourinary:  Negative for dysuria and hematuria.   Musculoskeletal:  Negative for back pain and neck pain.   Skin:  Negative for rash.   Neurological:  Negative for dizziness and headaches.   Psychiatric/Behavioral:  Negative for dysphoric mood, self-injury and suicidal ideas. The patient is nervous/anxious.        Objective   /90   Pulse 92   Temp 97.5 °F (36.4 °C) (Temporal)   Resp 16   Ht 5' 1\" (1.549 m)   Wt 77.7 kg (171 lb 3.2 oz)   SpO2 96%   BMI 32.35 kg/m²      Physical Exam  Vitals reviewed.   Constitutional:       General: She is not in acute distress.     Appearance: Normal appearance. She is obese. She is not ill-appearing.   HENT:      Mouth/Throat:      Mouth: Mucous membranes are moist.   Neck:      Vascular: No carotid bruit.   Cardiovascular:      Rate and Rhythm: Normal rate and regular rhythm.   Pulmonary:      Effort: Pulmonary effort is normal.      Breath sounds: Normal breath sounds.   Abdominal:      General: Bowel sounds are normal.      Palpations: Abdomen is soft.      Tenderness: There is no abdominal tenderness. There is no right CVA tenderness or left CVA tenderness.   Musculoskeletal:      Right lower leg: No edema.      Left lower leg: No edema.      Comments: No calf tenderness bilateral.   Skin:     General: Skin is warm.   Neurological:      General: No focal deficit present.      Mental Status: She is alert and oriented to person, place, and time.   Psychiatric:         Mood and Affect: Mood normal.         Behavior: Behavior normal.         Thought Content: Thought content " normal.      Comments: Regarding her anxiety, patient briefly counseled.  Patient denies SI HI.  Patient says that she is going to see a therapist at work soon because she has spoken to her boss about it already.

## 2025-02-05 NOTE — ASSESSMENT & PLAN NOTE
Controlled.  But slightly elevated.  LFTs normal.  Total cholesterol went up to 202 now when it was 183 in June of last year, triglycerides went up to 110 from 92, HDL went up to 53 from 48 and her LDL went up to 127 from 117.  Advised patient to cut down on her fats starches and sweets.  Lose weight with a better diet and exercise.  Patient does admit to eating a lot of cheese and salt.  Patient appropriately advised.  Recheck labs a week before her next visit with me in 5 months  Orders:  •  Comprehensive metabolic panel; Future  •  Lipid panel; Future

## 2025-02-05 NOTE — ASSESSMENT & PLAN NOTE
BMI of 32.3.  Advised patient to lose weight with a better diet and exercise.  Patient admits to poor diet and not exercising.  Patient appropriately advised.  Continue to monitor and follow-up.  Of note her A1c went up to 5.6 now when it was 5.4 in June of last year.  And patient was given appropriate advice regarding that also.

## 2025-05-01 ENCOUNTER — OFFICE VISIT (OUTPATIENT)
Dept: SLEEP CENTER | Facility: CLINIC | Age: 55
End: 2025-05-01
Payer: COMMERCIAL

## 2025-05-01 VITALS
DIASTOLIC BLOOD PRESSURE: 88 MMHG | SYSTOLIC BLOOD PRESSURE: 138 MMHG | BODY MASS INDEX: 32.66 KG/M2 | HEIGHT: 61 IN | WEIGHT: 173 LBS

## 2025-05-01 DIAGNOSIS — G47.19 EXCESSIVE DAYTIME SLEEPINESS: Primary | ICD-10-CM

## 2025-05-01 DIAGNOSIS — R06.83 SNORES: ICD-10-CM

## 2025-05-01 DIAGNOSIS — I10 ESSENTIAL HYPERTENSION: ICD-10-CM

## 2025-05-01 PROCEDURE — 99204 OFFICE O/P NEW MOD 45 MIN: CPT | Performed by: PHYSICIAN ASSISTANT

## 2025-05-01 NOTE — PROGRESS NOTES
Name: Sharyn Oakes      : 1970      MRN: [id number]  Encounter Provider: Patricia Suero PA-C  Encounter Date: 2025   Encounter department: [facility] [place]    :  Assessment & Plan  Excessive daytime sleepiness  Patient is experiencing symptoms which are consistent with obstructive sleep apnea including loud snoring, frequent nighttime awakenings, excessive daytime sleepiness, and unrefreshing sleep.  Home sleep study was ordered today.  We discussed sleep apnea and the importance of treating it especially given her history of hypertension to reduce her cardiovascular event risk.  We discussed treatment options including continued weight loss, CPAP, and a mandible advancing device.  Patient would be willing to try CPAP if she is found to have sleep apnea.  Orders:    Home Sleep Study; Future    Snores    Orders:    Ambulatory Referral to Sleep Medicine    Home Sleep Study; Future    Essential hypertension  Hypertension controlled with medication  Orders:    Home Sleep Study; Future    BMI 32.0-32.9,adult  Patient is working on weight loss and is taking semaglutide and healthy diet.  She has lost 35 pounds since beginning the medication.  Orders:    Home Sleep Study; Future      History of Present Illness     Marcia Oakes is a 54-year-old female with a significant past medical history of hypertension and obesity who presents today upon referral of her PCP for suspected obstructive sleep apnea.  Patient has concerns of loud snoring, frequent nighttime awakenings, and excessive daytime sleepiness.  She states she is working on weight loss and is going to a [facility] for treatment. She is being prescribed semaglutide.  She has been taking it for a few months now and has lost a total of 35 pounds.  Her goal is to lose an additional 15 pounds.  She denies completing a sleep study previously.  She denies previously using CPAP, BiPAP, or oxygen.  She denies any restless leg symptoms,  parasomnia symptoms, or narcolepsy symptoms.     Status : [employer]  used for this visit. # [id number] Mireya    Sleep/work schedule  Patient works 6 AM to 4:30 PM 4 days a week  Bedtime: Gets in bed at 8:30 PM, does not fall asleep until approximately 10 PM  3 awakenings per night to use the bathroom  Wake up time: 3:15 AM on workdays, approximately 7 AM on days off  Drives 20 minutes to work, denies drowsy driving  May nap 1 hour 1 time per week on a day off  Denies inappropriate dozing although she feels sleepy throughout the day      Sitting and reading: Slight chance of dozing  Watching TV: Slight chance of dozing  Sitting, inactive in a public place (e.g. a theatre or a meeting): Would never doze  As a passenger in a car for an hour without a break: Moderate chance of dozing  Lying down to rest in the afternoon when circumstances permit: Slight chance of dozing  Sitting and talking to someone: Would never doze  Sitting quietly after a lunch without alcohol: Would never doze  In a car, while stopped for a few minutes in traffic: Would never doze  Total score: 5       Review of Systems   HENT: Negative.     Respiratory: Negative.     Cardiovascular: Negative.    Allergic/Immunologic: Negative for environmental allergies.   Neurological:  Negative for headaches.   Psychiatric/Behavioral:  Positive for sleep disturbance (Wakes 3 times a night to urinate). Negative for decreased concentration. The patient is not nervous/anxious.      Pertinent positives/negatives included in HPI and also as noted:       Social History     Tobacco Use    Smoking status: Never    Smokeless tobacco: Never   Vaping Use    Vaping status: Never Used   Substance and Sexual Activity    Alcohol use: Yes     Alcohol/week: 1.0 standard drink of alcohol     Types: 1 Cans of beer per week     Comment: rare    Drug use: Never    Sexual activity: Yes     Partners: Male     Objective   /88 (BP Location: Left arm, Patient  "Position: Sitting, Cuff Size: Large)   Ht 5' 1\" (1.549 m)   Wt 78.5 kg (173 lb)   BMI 32.69 kg/m²     Neck Circumference: 13  Physical Exam  Visit Vitals  /88 (BP Location: Left arm, Patient Position: Sitting, Cuff Size: Large)   Ht 5' 1\" (1.549 m)   Wt 78.5 kg (173 lb)   BMI 32.69 kg/m²   OB Status Postmenopausal   Smoking Status Never   BSA 1.78 m²       Mallampati Grade : Mallampati 2-3  Oropharynx : appears normal   Tonsil size : not enlarged   Tongue : Normal tongue size  Soft palate and uvula : Normal soft palate  Hard Palate : normal   Neck : excess fatty tissue  Nose : Normal nasal structure  Craniofacial anatomy : normal  Dental Stucture and Dentition : Normal dentition       Appearance : no distress, alert, cooperative  Mental Status. Memory, and Affect : alert and oriented, normal affect, makes good eye contact, provides a detailed history  Cardiac- : regular rate and rhythm, S1, S2 normal, no murmur, click, rub or gallop  Pulmonary : clear to auscultation bilaterally  Cranial Nerves: CN II-XII grossly intact  No peripheral edema    Data  Lab Results   Component Value Date    HGB 15.0 02/01/2025    HCT 47.1 (H) 02/01/2025    MCV 92 02/01/2025      Lab Results   Component Value Date    CALCIUM 9.7 02/01/2025    K 5.1 02/01/2025    CO2 30 02/01/2025     02/01/2025    BUN 21 02/01/2025    CREATININE 0.66 02/01/2025     No results found for: \"IRON\", \"TIBC\", \"FERRITIN\"  Lab Results   Component Value Date    AST 19 02/01/2025    ALT 20 02/01/2025             "

## 2025-05-01 NOTE — ASSESSMENT & PLAN NOTE
Patient is working on weight loss and is taking semaglutide and healthy diet.  She has lost 35 pounds since beginning the medication.  Orders:    Home Sleep Study; Future

## 2025-05-14 ENCOUNTER — APPOINTMENT (EMERGENCY)
Dept: RADIOLOGY | Facility: HOSPITAL | Age: 55
End: 2025-05-14
Payer: COMMERCIAL

## 2025-05-14 ENCOUNTER — HOSPITAL ENCOUNTER (EMERGENCY)
Facility: HOSPITAL | Age: 55
Discharge: HOME | End: 2025-05-14
Attending: EMERGENCY MEDICINE
Payer: COMMERCIAL

## 2025-05-14 VITALS
TEMPERATURE: 97.6 F | OXYGEN SATURATION: 99 % | HEART RATE: 99 BPM | DIASTOLIC BLOOD PRESSURE: 77 MMHG | RESPIRATION RATE: 16 BRPM | SYSTOLIC BLOOD PRESSURE: 146 MMHG

## 2025-05-14 DIAGNOSIS — M25.551 RIGHT HIP PAIN: Primary | ICD-10-CM

## 2025-05-14 PROCEDURE — 99284 EMERGENCY DEPT VISIT MOD MDM: CPT | Performed by: EMERGENCY MEDICINE

## 2025-05-14 PROCEDURE — 73552 X-RAY EXAM OF FEMUR 2/>: CPT

## 2025-05-14 PROCEDURE — 72170 X-RAY EXAM OF PELVIS: CPT

## 2025-05-14 PROCEDURE — 99283 EMERGENCY DEPT VISIT LOW MDM: CPT

## 2025-05-14 RX ORDER — ACETAMINOPHEN 500 MG
500 TABLET ORAL EVERY 6 HOURS PRN
Qty: 20 TABLET | Refills: 0 | Status: SHIPPED | OUTPATIENT
Start: 2025-05-14 | End: 2025-05-19

## 2025-05-14 RX ORDER — OXYCODONE HYDROCHLORIDE 5 MG/1
5 TABLET ORAL ONCE
Refills: 0 | Status: COMPLETED | OUTPATIENT
Start: 2025-05-14 | End: 2025-05-14

## 2025-05-14 RX ORDER — LIDOCAINE 50 MG/G
1 PATCH TOPICAL ONCE
Status: DISCONTINUED | OUTPATIENT
Start: 2025-05-14 | End: 2025-05-14 | Stop reason: HOSPADM

## 2025-05-14 RX ORDER — OXYCODONE HYDROCHLORIDE 5 MG/1
5 TABLET ORAL EVERY 6 HOURS PRN
Qty: 8 TABLET | Refills: 0 | Status: SHIPPED | OUTPATIENT
Start: 2025-05-14 | End: 2025-05-17

## 2025-05-14 RX ORDER — ACETAMINOPHEN 325 MG/1
975 TABLET ORAL ONCE
Status: COMPLETED | OUTPATIENT
Start: 2025-05-14 | End: 2025-05-14

## 2025-05-14 RX ORDER — LIDOCAINE 50 MG/G
1 PATCH TOPICAL DAILY
Qty: 7 PATCH | Refills: 0 | Status: SHIPPED | OUTPATIENT
Start: 2025-05-14 | End: 2025-05-21

## 2025-05-14 RX ADMIN — ACETAMINOPHEN 975 MG: 325 TABLET ORAL at 11:50

## 2025-05-14 RX ADMIN — OXYCODONE HYDROCHLORIDE 5 MG: 5 TABLET ORAL at 11:51

## 2025-05-14 RX ADMIN — LIDOCAINE 1 PATCH: 50 PATCH CUTANEOUS at 11:52

## 2025-05-14 NOTE — Clinical Note
Sharyn Oakes was seen and treated in our emergency department on 5/14/2025.                Diagnosis:     Sharyn  may return to work on return date.    She may return on this date: 05/16/2025         If you have any questions or concerns, please don't hesitate to call.      Mihai Prado MD    ______________________________           _______________          _______________  Hospital Representative                              Date                                Time

## 2025-05-14 NOTE — DISCHARGE INSTRUCTIONS
You were evaluated in the emergency department for: right hip pain. You can access your current and pending results through [employer]. A radiologist will take a second look at your X-Rays, if you had any, and you will be contacted with any new findings.    - You should follow-up with your primary care provider, as soon as possible, for re-evaluation.  - You are being referred to physical therapy  -     Please, return to the emergency department if you experience new or worsening symptoms, fever, chest pain, shortness of breath, difficulty breathing, dizziness, abdominal pain, persistent nausea/vomiting, syncope or passing out, blood in your urine or stool, coughing up blood, leg swelling/pain, urinary retention, bowel or bladder incontinence, numbness between your legs.

## 2025-05-14 NOTE — ED ATTENDING ATTESTATION
5/14/2025  I, Mihai Prado MD, saw and evaluated the patient. I have discussed the patient with the resident/non-physician practitioner and agree with the resident's/non-physician practitioner's findings, Plan of Care, and MDM as documented in the resident's/non-physician practitioner's note, except where noted. All available labs and Radiology studies were reviewed.  I was present for key portions of any procedure(s) performed by the resident/non-physician practitioner and I was immediately available to provide assistance.       At this point I agree with the current assessment done in the Emergency Department.  I have conducted an independent evaluation of this patient a history and physical is as follows: Right posterior lateral hip pain, worsens with movement.  Positive straight leg raise on the right.  2+ distal pulse, no calf swelling or tenderness.  No fevers or chills.  No overlying redness or warmth.  X-rays with no acute fracture or malalignment identified.  History and physical most consistent with musculoskeletal pain.  Improved with ED management.  Able to ambulate.  Will discharge home with outpatient follow-up.    XR femur 2 views RIGHT   ED Interpretation by Aniya Rubio MD (05/14 1241)   Per my interpretation: No acute-osseous abnormality      Final Result by Destin Saul MD (05/14 1251)      No acute osseous abnormality.         Computerized Assisted Algorithm (CAA) may have been used to analyze all applicable images.         Workstation performed: [id number]         XR pelvis ap only 1 or 2 vw   ED Interpretation by Aniya Rubio MD (05/14 1241)   Per my interpretation: No acute osseous abnormality      Final Result by Destin Saul MD (05/14 1250)      No acute osseous abnormality.         Computerized Assisted Algorithm (CAA) may have been used to analyze all applicable images.         Workstation performed: [id number]               ED Course          Critical Care Time  Procedures

## 2025-05-14 NOTE — ED PROVIDER NOTES
Time reflects when diagnosis was documented in both MDM as applicable and the Disposition within this note       Time User Action Codes Description Comment    5/14/2025 11:39 AM Aniya Rubio Add [M25.551] Right hip pain           ED Disposition       ED Disposition   Discharge    Condition   Stable    Date/Time   Wed May 14, 2025 12:41 PM    Comment   Sharyn Oakes discharge to home/self care.                   Assessment & Plan       Medical Decision Making  Sharyn Oakes is a 54 y.o. female presenting with right hip pain. Able to ambulate. Vitals unremarkable. Exam remarkable for TTP to over right hip. No pain with extension, flexion. Some some with abduction and external. Full sensation. No overlying skin changes. No swelling over lower extremity compared to right. Distal strength in knee and ankle compared to right - pain in right ankle since procedure, not worse.    Differential diagnosis including but not limited to: muscular pain, muscle spasm, spinal stenosis, radiculopathy, arthritis, sciatica, herniated disk, sprain, strain, fracture, dislocation, contusion; doubt compartment syndrome, doubt cauda equina syndrome, epidural abscess, transverses myelitis, AAA.    See ED course for further updates and interpretation of results.    Based on these results and H&P, most suspect musculoskeletal pain vs radiculopathy from bulging disk or possibly a combination. Did report improvement of pain with multimodal pain control. She was ambulatory in the ED. Referred to PT and given contact to orthopedics. Prescribed a few days of oxycodone for breakthrough pain. Stable for discharge.    Results, clinical impressions, and plan were discussed with patient and family. They expressed understanding and were in agreement with plan. Patient was given the opportunity to ask questions in ED. All questions and concerns were addressed in ED.    After evaluation and workup in the emergency department Patient appears well, is  nontoxic appearing, expresses understanding and agrees with plan of care at this time.  In light of this patient would benefit from outpatient management. Discussed strict return precautions.  Discussed importance of following up with PCP in the next few days.  All questions answered.  Patient is agreeable to discharge.    Amount and/or Complexity of Data Reviewed  Radiology: ordered and independent interpretation performed.    Risk  OTC drugs.  Prescription drug management.        ED Course as of 05/15/25 0745   Wed May 14, 2025   1134 MRI 9/11  IMPRESSION:     Mild noncompressive lumbar degenerative change most pronounced at L4-5 where there is annular bulging and facet degenerative change.   1136 Patient has a ride home.   1143 Patient reports approximately 1 week of throbbing right hip pain without radiation.  No numbness, tingling, difficulty moving leg.  No fevers, no known trauma.  Patient has tried ibuprofen and Excedrin at home with significant relief.  Reports she has osteoarthritis, and has had similar pain in the past, however this is not going away.  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   Also reporting some intermittent nausea and congestion. No nausea currently, but does have mild headache. No dizziness, chest pain, palpitations, shortness of breath, abdominal pain, urinary symptoms, and other new extremity weakness, swelling and pain.    Of note patient did have an MRI a few months ago, showed some bulging of a disc.    On exam TTP to over right hip. No pain with extension, flexion. Some some with abduction and external. Full sensation. No overlying skin changes. No swelling over lower extremity compared to right. Distal strength in knee and ankle compared to right - pain in right ankle since procedure, not worse.   1203 Pending hip xray   1241 Xrays appear negative for acute fractures at  this time.       Medications   acetaminophen (TYLENOL) tablet 975 mg (975 mg Oral Given 5/14/25 1150)   oxyCODONE (ROXICODONE) IR tablet 5 mg (5 mg Oral Given 5/14/25 1151)       ED Risk Strat Scores                    No data recorded                            History of Present Illness       Chief Complaint   Patient presents with    Hip Injury     R hip pain x1wk, no injury, no radiation of the pain       Past Medical History:   Diagnosis Date    Arthritis     Bacterial vaginosis     Bilateral bunions     COVID-19     Migraine     Obesity     Varicella       Past Surgical History:   Procedure Laterality Date    NC ARTHRS KNE SURG W/MENISCECTOMY MED/LAT W/SHVG Left 02/12/2020    Procedure: ARTHROSCOPY KNEE partial medial menisectomy;  Surgeon: Abel Suarez MD;  Location:  MAIN OR;  Service: Orthopedics    NC CORRECTION HAMMERTOE Right 12/23/2022    Procedure: REPAIR RIGHT  HAMMERTOE  2nd;  Surgeon: Bin Dominguez DPM;  Location:  MAIN OR;  Service: Podiatry    NC CORRJ HLX VLGS BNCTY SESMDC DSTL METAR OSTEOT Bilateral 12/23/2022    Procedure: BUNIONECTOMY LIZA;  Surgeon: Bin Dominguez DPM;  Location:  MAIN OR;  Service: Podiatry    NC OSTECTOMY PRTL 5TH METAR HEAD SPX Bilateral 12/23/2022    Procedure: BUNIONECTOMY TAILOR;  Surgeon: Bin Dominguez DPM;  Location:  MAIN OR;  Service: Podiatry    NC SYNDACTYLIZATION TOES Right 2/16/2024    Procedure: PLANTAR PLATE REPAIR WITH CAPSULORRAPHY;  Surgeon: Bin Dominguez DPM;  Location: [facility];  Service: Podiatry      Family History   Problem Relation Age of Onset    Hypertension Mother     Prostate cancer Father         unknown age    Cancer Father     No Known Problems Sister     No Known Problems Daughter     No Known Problems Maternal Grandmother     No Known Problems Maternal Grandfather     No Known Problems Paternal Grandmother     No Known Problems Paternal Grandfather     No Known Problems Maternal Aunt     No Known Problems Paternal Aunt        Social History[1]   E-Cigarette/Vaping    E-Cigarette Use Never User       E-Cigarette/Vaping Substances    Nicotine No     THC No     CBD No     Flavoring No     Other No     Unknown No       I have reviewed and agree with the history as documented.     Sharyn Oakes is a 54 y.o. female with history of osteoarthritis presenting for right hip pain.    Patient reports approximately 1 week of throbbing right hip pain without radiation.  No numbness, tingling, difficulty moving leg.  No fevers, no known trauma.  Patient has tried ibuprofen and Excedrin at home with significant relief.  Reports she has osteoarthritis, and has had similar pain in the past, however this is not going away.  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     Also reporting some intermittent nausea and congestion. No nausea currently, but does have mild headache. No dizziness, chest pain, palpitations, shortness of breath, abdominal pain, urinary symptoms, and other new extremity weakness, swelling and pain.    Of note patient did have an MRI a few months ago, showed some bulging of a disc.          Review of Systems   Constitutional:  Negative for chills and fever.   HENT:  Positive for congestion. Negative for hearing loss and trouble swallowing.    Eyes:  Negative for pain and visual disturbance.   Respiratory:  Negative for cough and shortness of breath.    Cardiovascular:  Negative for chest pain, palpitations and leg swelling.   Gastrointestinal:  Positive for nausea. Negative for abdominal pain, constipation, diarrhea and vomiting.   Genitourinary:  Negative for dysuria, frequency and hematuria.   Musculoskeletal:  Positive for arthralgias (Right hip pain) and joint swelling (Right hip pain). Negative for back pain, neck pain and neck stiffness.   Skin:  Negative for color change and rash.   Neurological:  Positive for headaches.  Negative for dizziness, seizures, syncope, weakness and light-headedness.   Psychiatric/Behavioral:  Negative for dysphoric mood.    All other systems reviewed and are negative.          Objective       ED Triage Vitals   Temperature Pulse Blood Pressure Respirations SpO2 Patient Position - Orthostatic VS   05/14/25 1041 05/14/25 1041 05/14/25 1041 05/14/25 1041 05/14/25 1041 --   97.6 °F (36.4 °C) 99 146/77 16 99 %       Temp src Heart Rate Source BP Location FiO2 (%) Pain Score    -- -- -- -- 05/14/25 1150        7      Vitals      Date and Time Temp Pulse SpO2 Resp BP Pain Score FACES Pain Rating User   05/14/25 1150 -- -- -- -- -- 7 -- JDT   05/14/25 1041 97.6 °F (36.4 °C) 99 99 % 16 146/77 -- -- KM            Physical Exam  Vitals and nursing note reviewed.   Constitutional:       General: She is not in acute distress.     Appearance: She is well-developed.   HENT:      Head: Normocephalic and atraumatic.      Mouth/Throat:      Mouth: Mucous membranes are moist.      Pharynx: Oropharynx is clear.     Eyes:      Extraocular Movements: Extraocular movements intact.      Conjunctiva/sclera: Conjunctivae normal.       Cardiovascular:      Rate and Rhythm: Normal rate and regular rhythm.      Pulses: Normal pulses.      Heart sounds: Normal heart sounds.   Pulmonary:      Effort: Pulmonary effort is normal. No respiratory distress.      Breath sounds: Normal breath sounds. No wheezing, rhonchi or rales.   Abdominal:      General: Abdomen is flat.      Palpations: Abdomen is soft.      Tenderness: There is no abdominal tenderness.     Musculoskeletal:         General: Tenderness present. No swelling, deformity or signs of injury.      Cervical back: Normal range of motion.      Right lower leg: No edema.      Left lower leg: No edema.      Comments: TTP to over right hip. No pain with extension, flexion. Some some with abduction and external. Full sensation. No overlying skin changes. No swelling over lower  extremity compared to right. Distal strength in knee and ankle compared to right - pain in right ankle since procedure, not worse.     Skin:     General: Skin is warm and dry.      Capillary Refill: Capillary refill takes less than 2 seconds.     Neurological:      General: No focal deficit present.      Mental Status: She is alert and oriented to person, place, and time.      Sensory: No sensory deficit.      Motor: No weakness.     Psychiatric:         Mood and Affect: Mood normal.         Behavior: Behavior normal.         Results Reviewed       None            XR femur 2 views RIGHT   ED Interpretation by Aniya Rubio MD (05/14 1241)   Per my interpretation: No acute-osseous abnormality      Final Interpretation by Destin Saul MD (05/14 1251)      No acute osseous abnormality.         Computerized Assisted Algorithm (CAA) may have been used to analyze all applicable images.         Workstation performed: [id number]         XR pelvis ap only 1 or 2 vw   ED Interpretation by Aniya Rubio MD (05/14 1241)   Per my interpretation: No acute osseous abnormality      Final Interpretation by Destin Saul MD (05/14 1250)      No acute osseous abnormality.         Computerized Assisted Algorithm (CAA) may have been used to analyze all applicable images.         Workstation performed: [id number]             Procedures    ED Medication and Procedure Management   Prior to Admission Medications   Prescriptions Last Dose Informant Patient Reported? Taking?   Aspirin-Acetaminophen-Caffeine (EXCEDRIN PO)  Self Yes No   Sig: Take 1 tablet by mouth as needed   Biotin 10 MG CAPS  Self Yes No   Sig: Take 1 capsule by mouth daily   COLLAGEN PO  Self Yes No   Sig: Take 1 Scoop by mouth daily   Cequa 0.09 % SOLN  Self Yes No   Cyanocobalamin (VITAMIN B 12 PO)  Self Yes No   Sig: Take 1 tablet by mouth every other day   Misc Natural Products (APPLE CIDER VINEGAR DIET PO)  Self Yes No   Sig: Take  1 capsule by mouth daily   Omega-3 1000 MG CAPS  Self Yes No   Sig: Take 1 capsule by mouth daily   Restasis 0.05 % ophthalmic emulsion  Self Yes No   Sig: instill 1 drop into both eyes twice a day   VITAMIN A PO  Self Yes No   Sig: Take 1 capsule by mouth every other day   VITAMIN D PO  Self Yes No   Sig: Take 1 tablet by mouth in the morning   buPROPion (WELLBUTRIN XL) 150 mg 24 hr tablet  Self Yes No   Sig: Take 150 mg by mouth every morning   dexamethasone sodium phosphate 0.1 % ophthalmic solution  Self Yes No   Sig: INSTILL 1 DROP INTO BOTH EYES TWICE A DAY FOR 2 WEEKS   gabapentin (Neurontin) 300 mg capsule   No No   Sig: Take 1 capsule (300 mg total) by mouth daily at bedtime   gabapentin (Neurontin) 300 mg capsule  Self No No   Sig: Take 1 capsule (300 mg total) by mouth daily at bedtime   ibuprofen (MOTRIN) 600 mg tablet  Self Yes No   Sig: Take 600 mg by mouth every 6 (six) hours as needed   losartan (COZAAR) 25 mg tablet  Self No No   Sig: TAKE 1 TABLET (25 MG TOTAL) BY MOUTH DAILY.   naproxen (NAPROSYN) 500 mg tablet  Self No No   Sig: TAKE 1 TABLET BY MOUTH TWICE A DAY WITH FOOD   triamcinolone (KENALOG) 0.1 % ointment  Self Yes No   Sig: Apply 1 Application topically 2 (two) times a day To affected area      Facility-Administered Medications: None     Discharge Medication List as of 5/14/2025  1:01 PM        START taking these medications    Details   acetaminophen (TYLENOL) 500 mg tablet Take 1 tablet (500 mg total) by mouth every 6 (six) hours as needed for moderate pain for up to 5 days, Starting Wed 5/14/2025, Until Mon 5/19/2025 at 2359, Normal      lidocaine (Lidoderm) 5 % Apply 1 patch topically daily over 12 hours for 7 days Remove & Discard patch within 12 hours or as directed by MD, Starting Wed 5/14/2025, Until Wed 5/21/2025, Normal      oxyCODONE (Roxicodone) 5 immediate release tablet Take 1 tablet (5 mg total) by mouth every 6 (six) hours as needed for severe pain for up to 3 days Max  Daily Amount: 20 mg, Starting Wed 5/14/2025, Until Sat 5/17/2025 at 2359, Normal           CONTINUE these medications which have NOT CHANGED    Details   Aspirin-Acetaminophen-Caffeine (EXCEDRIN PO) Take 1 tablet by mouth as needed, Historical Med      Biotin 10 MG CAPS Take 1 capsule by mouth daily, Historical Med      buPROPion (WELLBUTRIN XL) 150 mg 24 hr tablet Take 150 mg by mouth every morning, Starting Tue 3/30/2021, Historical Med      Cequa 0.09 % SOLN Historical Med      COLLAGEN PO Take 1 Scoop by mouth daily, Historical Med      Cyanocobalamin (VITAMIN B 12 PO) Take 1 tablet by mouth every other day, Historical Med      dexamethasone sodium phosphate 0.1 % ophthalmic solution INSTILL 1 DROP INTO BOTH EYES TWICE A DAY FOR 2 WEEKS, Historical Med      gabapentin (Neurontin) 300 mg capsule Take 1 capsule (300 mg total) by mouth daily at bedtime, Starting Thu 8/15/2024, Until Thu 5/1/2025, Normal      gabapentin (Neurontin) 300 mg capsule Take 1 capsule (300 mg total) by mouth daily at bedtime, Starting Thu 10/17/2024, Until Thu 5/1/2025, Normal      ibuprofen (MOTRIN) 600 mg tablet Take 600 mg by mouth every 6 (six) hours as needed, Historical Med      losartan (COZAAR) 25 mg tablet TAKE 1 TABLET (25 MG TOTAL) BY MOUTH DAILY., Starting Sat 9/14/2024, Normal      Misc Natural Products (APPLE CIDER VINEGAR DIET PO) Take 1 capsule by mouth daily, Historical Med      naproxen (NAPROSYN) 500 mg tablet TAKE 1 TABLET BY MOUTH TWICE A DAY WITH FOOD, Starting Thu 1/16/2025, Normal      Omega-3 1000 MG CAPS Take 1 capsule by mouth daily, Historical Med      Restasis 0.05 % ophthalmic emulsion instill 1 drop into both eyes twice a day, Historical Med      triamcinolone (KENALOG) 0.1 % ointment Apply 1 Application topically 2 (two) times a day To affected area, Starting Mon 9/18/2023, Historical Med      VITAMIN A PO Take 1 capsule by mouth every other day, Historical Med      VITAMIN D PO Take 1 tablet by mouth in the  morning, Historical Med           No discharge procedures on file.  ED SEPSIS DOCUMENTATION   Time reflects when diagnosis was documented in both MDM as applicable and the Disposition within this note       Time User Action Codes Description Comment    5/14/2025 11:39 AM Aniya Rubio Add [M25.551] Right hip pain                    [1]   Social History  Tobacco Use    Smoking status: Never    Smokeless tobacco: Never   Vaping Use    Vaping status: Never Used   Substance Use Topics    Alcohol use: Yes     Alcohol/week: 1.0 standard drink of alcohol     Types: 1 Cans of beer per week     Comment: rare    Drug use: Never        Aniya Rubio MD  05/15/25 0745

## 2025-05-27 ENCOUNTER — HOSPITAL ENCOUNTER (OUTPATIENT)
Dept: SLEEP CENTER | Facility: CLINIC | Age: 55
Discharge: HOME | End: 2025-05-27
Attending: PHYSICIAN ASSISTANT
Payer: COMMERCIAL

## 2025-05-27 DIAGNOSIS — R06.83 SNORES: ICD-10-CM

## 2025-05-27 DIAGNOSIS — G47.19 EXCESSIVE DAYTIME SLEEPINESS: ICD-10-CM

## 2025-05-27 DIAGNOSIS — I10 ESSENTIAL HYPERTENSION: ICD-10-CM

## 2025-05-27 PROCEDURE — G0399 HOME SLEEP TEST/TYPE 3 PORTA: HCPCS

## 2025-05-27 NOTE — PROGRESS NOTES
Home Sleep Study Documentation    HOME STUDY DEVICE: Noxturnal no                                           Savanna G3 yes device # 19      Pre-Sleep Home Study:    Set-up and instructions performed by: Nighat    Technician performed demonstration for Patient: yes    Return demonstration performed by Patient: yes    Written instructions provided to Patient: yes    Patient signed consent form: yes          Post-Sleep Home Study:        Additional comments by Patient: None    Home Sleep Study Failed:no:    Failure reason: N/A    Reported or Detected: N/A    Scored by: DIANA Humphries

## 2025-05-30 PROBLEM — G47.33 OSA (OBSTRUCTIVE SLEEP APNEA): Status: ACTIVE | Noted: 2025-05-30

## 2025-05-31 PROCEDURE — 95806 SLEEP STUDY UNATT&RESP EFFT: CPT | Performed by: PSYCHIATRY & NEUROLOGY

## 2025-06-03 ENCOUNTER — RESULTS FOLLOW-UP (OUTPATIENT)
Dept: SLEEP CENTER | Facility: CLINIC | Age: 55
End: 2025-06-03

## 2025-06-03 DIAGNOSIS — G47.33 OSA (OBSTRUCTIVE SLEEP APNEA): Primary | ICD-10-CM

## 2025-06-12 NOTE — TELEPHONE ENCOUNTER
Via  [id number]    Home study resulted, confirms mild ALEJANDRINA (ELOY-7.5).     CPAP recommended and prescription provided.    Call placed to patient, advised of above.    Patient amenable to using [facility] as DME supplier.  Added to the [employer] DME list for order processing.    Compliance follow-up 9/11/2025 with Patricia Suero PA-C in [place].

## 2025-06-13 ENCOUNTER — TELEPHONE (OUTPATIENT)
Dept: SLEEP CENTER | Facility: CLINIC | Age: 55
End: 2025-06-13

## 2025-06-13 LAB
DME PARACHUTE DELIVERY DATE REQUESTED: NORMAL
DME PARACHUTE ITEM DESCRIPTION: NORMAL
DME PARACHUTE ORDER STATUS: NORMAL
DME PARACHUTE SUPPLIER NAME: NORMAL
DME PARACHUTE SUPPLIER PHONE: NORMAL

## 2025-07-19 ENCOUNTER — APPOINTMENT (OUTPATIENT)
Dept: LAB | Age: 55
End: 2025-07-19
Payer: COMMERCIAL

## 2025-07-19 DIAGNOSIS — R31.29 MICROSCOPIC HEMATURIA: ICD-10-CM

## 2025-07-19 DIAGNOSIS — I10 ESSENTIAL HYPERTENSION: ICD-10-CM

## 2025-07-19 DIAGNOSIS — Z13.1 SCREENING FOR DIABETES MELLITUS: ICD-10-CM

## 2025-07-19 DIAGNOSIS — E78.00 PURE HYPERCHOLESTEROLEMIA: ICD-10-CM

## 2025-07-19 LAB
ALBUMIN SERPL BCG-MCNC: 4.2 G/DL (ref 3.5–5)
ALP SERPL-CCNC: 98 U/L (ref 34–104)
ALT SERPL W P-5'-P-CCNC: 17 U/L (ref 7–52)
ANION GAP SERPL CALCULATED.3IONS-SCNC: 4 MMOL/L (ref 4–13)
AST SERPL W P-5'-P-CCNC: 17 U/L (ref 13–39)
BACTERIA UR QL AUTO: ABNORMAL /HPF
BASOPHILS # BLD AUTO: 0.07 THOUSANDS/ÂΜL (ref 0–0.1)
BASOPHILS NFR BLD AUTO: 1 % (ref 0–1)
BILIRUB SERPL-MCNC: 0.45 MG/DL (ref 0.2–1)
BILIRUB UR QL STRIP: NEGATIVE
BUN SERPL-MCNC: 14 MG/DL (ref 5–25)
CALCIUM SERPL-MCNC: 9.5 MG/DL (ref 8.4–10.2)
CHLORIDE SERPL-SCNC: 105 MMOL/L (ref 96–108)
CHOLEST SERPL-MCNC: 191 MG/DL (ref ?–200)
CLARITY UR: ABNORMAL
CO2 SERPL-SCNC: 31 MMOL/L (ref 21–32)
COLOR UR: YELLOW
CREAT SERPL-MCNC: 0.6 MG/DL (ref 0.6–1.3)
EOSINOPHIL # BLD AUTO: 0.12 THOUSAND/ÂΜL (ref 0–0.61)
EOSINOPHIL NFR BLD AUTO: 1 % (ref 0–6)
ERYTHROCYTE [DISTWIDTH] IN BLOOD BY AUTOMATED COUNT: 13.2 % (ref 11.6–15.1)
EST. AVERAGE GLUCOSE BLD GHB EST-MCNC: 114 MG/DL
GFR SERPL CREATININE-BSD FRML MDRD: 102 ML/MIN/1.73SQ M
GLUCOSE P FAST SERPL-MCNC: 85 MG/DL (ref 65–99)
GLUCOSE UR STRIP-MCNC: NEGATIVE MG/DL
HBA1C MFR BLD: 5.6 %
HCT VFR BLD AUTO: 46 % (ref 34.8–46.1)
HDLC SERPL-MCNC: 48 MG/DL
HGB BLD-MCNC: 14.7 G/DL (ref 11.5–15.4)
HGB UR QL STRIP.AUTO: NEGATIVE
HYALINE CASTS #/AREA URNS LPF: ABNORMAL /LPF
IMM GRANULOCYTES # BLD AUTO: 0.04 THOUSAND/UL (ref 0–0.2)
IMM GRANULOCYTES NFR BLD AUTO: 0 % (ref 0–2)
KETONES UR STRIP-MCNC: NEGATIVE MG/DL
LDLC SERPL CALC-MCNC: 110 MG/DL (ref 0–100)
LEUKOCYTE ESTERASE UR QL STRIP: NEGATIVE
LYMPHOCYTES # BLD AUTO: 2.61 THOUSANDS/ÂΜL (ref 0.6–4.47)
LYMPHOCYTES NFR BLD AUTO: 29 % (ref 14–44)
MCH RBC QN AUTO: 29 PG (ref 26.8–34.3)
MCHC RBC AUTO-ENTMCNC: 32 G/DL (ref 31.4–37.4)
MCV RBC AUTO: 91 FL (ref 82–98)
MONOCYTES # BLD AUTO: 0.48 THOUSAND/ÂΜL (ref 0.17–1.22)
MONOCYTES NFR BLD AUTO: 5 % (ref 4–12)
MUCOUS THREADS UR QL AUTO: ABNORMAL
NEUTROPHILS # BLD AUTO: 5.75 THOUSANDS/ÂΜL (ref 1.85–7.62)
NEUTS SEG NFR BLD AUTO: 64 % (ref 43–75)
NITRITE UR QL STRIP: NEGATIVE
NON-SQ EPI CELLS URNS QL MICRO: ABNORMAL /HPF
NONHDLC SERPL-MCNC: 143 MG/DL
NRBC BLD AUTO-RTO: 0 /100 WBCS
PH UR STRIP.AUTO: 6.5 [PH]
PLATELET # BLD AUTO: 324 THOUSANDS/UL (ref 149–390)
PMV BLD AUTO: 9.9 FL (ref 8.9–12.7)
POTASSIUM SERPL-SCNC: 4.4 MMOL/L (ref 3.5–5.3)
PROT SERPL-MCNC: 7.2 G/DL (ref 6.4–8.4)
PROT UR STRIP-MCNC: ABNORMAL MG/DL
RBC # BLD AUTO: 5.07 MILLION/UL (ref 3.81–5.12)
RBC #/AREA URNS AUTO: ABNORMAL /HPF
SODIUM SERPL-SCNC: 140 MMOL/L (ref 135–147)
SP GR UR STRIP.AUTO: 1.02 (ref 1–1.03)
TRIGL SERPL-MCNC: 163 MG/DL (ref ?–150)
UROBILINOGEN UR STRIP-ACNC: <2 MG/DL
WBC # BLD AUTO: 9.07 THOUSAND/UL (ref 4.31–10.16)
WBC #/AREA URNS AUTO: ABNORMAL /HPF

## 2025-07-19 PROCEDURE — 36415 COLL VENOUS BLD VENIPUNCTURE: CPT

## 2025-07-19 PROCEDURE — 80061 LIPID PANEL: CPT

## 2025-07-19 PROCEDURE — 83036 HEMOGLOBIN GLYCOSYLATED A1C: CPT

## 2025-07-19 PROCEDURE — 85025 COMPLETE CBC W/AUTO DIFF WBC: CPT

## 2025-07-19 PROCEDURE — 80053 COMPREHEN METABOLIC PANEL: CPT

## 2025-07-19 PROCEDURE — 81001 URINALYSIS AUTO W/SCOPE: CPT

## 2025-07-24 ENCOUNTER — OFFICE VISIT (OUTPATIENT)
Dept: PODIATRY | Facility: CLINIC | Age: 55
End: 2025-07-24
Payer: COMMERCIAL

## 2025-07-24 ENCOUNTER — OFFICE VISIT (OUTPATIENT)
Dept: FAMILY MEDICINE CLINIC | Facility: CLINIC | Age: 55
End: 2025-07-24
Payer: COMMERCIAL

## 2025-07-24 VITALS
DIASTOLIC BLOOD PRESSURE: 70 MMHG | HEART RATE: 86 BPM | SYSTOLIC BLOOD PRESSURE: 144 MMHG | HEIGHT: 61 IN | OXYGEN SATURATION: 97 % | BODY MASS INDEX: 31.64 KG/M2 | RESPIRATION RATE: 16 BRPM | WEIGHT: 167.6 LBS | TEMPERATURE: 97.8 F

## 2025-07-24 DIAGNOSIS — M20.41 HAMMER TOE OF RIGHT FOOT: ICD-10-CM

## 2025-07-24 DIAGNOSIS — Z13.1 SCREENING FOR DIABETES MELLITUS: ICD-10-CM

## 2025-07-24 DIAGNOSIS — I10 ESSENTIAL HYPERTENSION: ICD-10-CM

## 2025-07-24 DIAGNOSIS — Z12.11 SCREEN FOR COLON CANCER: ICD-10-CM

## 2025-07-24 DIAGNOSIS — E66.09 CLASS 1 OBESITY DUE TO EXCESS CALORIES WITHOUT SERIOUS COMORBIDITY WITH BODY MASS INDEX (BMI) OF 31.0 TO 31.9 IN ADULT: ICD-10-CM

## 2025-07-24 DIAGNOSIS — E78.00 PURE HYPERCHOLESTEROLEMIA: ICD-10-CM

## 2025-07-24 DIAGNOSIS — Z23 NEED FOR VACCINATION: ICD-10-CM

## 2025-07-24 DIAGNOSIS — Z12.4 SCREENING FOR CERVICAL CANCER: ICD-10-CM

## 2025-07-24 DIAGNOSIS — G62.9 PERIPHERAL NERVE DISORDER: Primary | ICD-10-CM

## 2025-07-24 DIAGNOSIS — Z78.0 ASYMPTOMATIC POSTMENOPAUSAL STATE: ICD-10-CM

## 2025-07-24 DIAGNOSIS — E66.811 CLASS 1 OBESITY DUE TO EXCESS CALORIES WITHOUT SERIOUS COMORBIDITY WITH BODY MASS INDEX (BMI) OF 31.0 TO 31.9 IN ADULT: ICD-10-CM

## 2025-07-24 DIAGNOSIS — Z00.00 ANNUAL PHYSICAL EXAM: Primary | ICD-10-CM

## 2025-07-24 PROBLEM — R87.610 ATYPICAL SQUAMOUS CELLS OF UNDETERMINED SIGNIFICANCE (ASCUS) ON PAPANICOLAOU SMEAR OF CERVIX: Status: ACTIVE | Noted: 2025-06-11

## 2025-07-24 PROBLEM — N87.0 CIN I (CERVICAL INTRAEPITHELIAL NEOPLASIA I): Status: ACTIVE | Noted: 2020-03-06

## 2025-07-24 PROCEDURE — 99214 OFFICE O/P EST MOD 30 MIN: CPT | Performed by: FAMILY MEDICINE

## 2025-07-24 PROCEDURE — 99396 PREV VISIT EST AGE 40-64: CPT | Performed by: FAMILY MEDICINE

## 2025-07-24 PROCEDURE — 99213 OFFICE O/P EST LOW 20 MIN: CPT | Performed by: PODIATRIST

## 2025-07-24 RX ORDER — GABAPENTIN 100 MG/1
100 CAPSULE ORAL
Qty: 90 CAPSULE | Refills: 0 | Status: SHIPPED | OUTPATIENT
Start: 2025-07-24 | End: 2025-10-22

## 2025-07-24 NOTE — PROGRESS NOTES
Adult Annual Physical  Name: Sharyn Oakes      : 1970      MRN: [id number]  Encounter Provider: Stuart Bneson MD  Encounter Date: 2025   Encounter department: [facility]    :  Assessment & Plan  Annual physical exam  Regarding her annual physical, patient is given age and diagnosis appropriate evaluation and care.  Patient's recent labs were discussed with patient.  Patient is ordered more labs for her next visit Which also includes a screening test for diabetes as discussed with patient and with her permission.  Patient advised to lose weight with a better diet and exercise.  Get her mammogram done when scheduled later on this year.  Patient given a GI referral for her next colonoscopy.  Patient is given an order for bone density test.  Also advised PCV 20 vaccine and the Tdap vaccine.  Advised calcium vitamin D, vitamin D3, multivitamin.  Advise routine self skin checks and use of sunscreen.  Also advised monthly self breast exams.    Orders:  •  Lipid panel; Future  •  Comprehensive metabolic panel; Future  •  UA w Reflex to Microscopic w Reflex to Culture; Future    Pure hypercholesterolemia  Controlled.  LFTs normal.  Total cholesterol went down to 191 now when it was 202 in February of this year, triglycerides went up to 163 from 110.  Her HDL went down to 48 from 23 and her LDL went down to 110 from 127.  Advised patient to have less starches sweets and fats.  Lose weight with a better diet and exercise.  Recheck labs again in 6 months.  Orders:  •  Lipid panel; Future  •  Comprehensive metabolic panel; Future    Essential hypertension  Controlled.  It does go up when she gets anxious.  Patient asserts that she does not take any medication for it.  Is diet controlled.  Advised patient have a low-sodium diet.  Decrease her anxiety if/when possible.  To see if the better diet and exercise.  Hydrate well.  Continue to monitor and follow-up.  Orders:  •  Comprehensive  metabolic panel; Future  •  UA w Reflex to Microscopic w Reflex to Culture; Future    Class 1 obesity due to excess calories without serious comorbidity with body mass index (BMI) of 31.0 to 31.9 in adult  Obesity with a BMI of 31.67.  Advised patient to lose weight with a better diet and exercise.  Labs above reviewed.  Continue to monitor follow-up.         Screening for diabetes mellitus  Discussed with patient.  Her A1c this time was 5.6 as it was last time as well.  Advised patient to lose weight with a better diet and exercise.  Cut down on starches sweets and fats.  And recheck her A1c in 6 months with her next labs.  Orders:  •  Hemoglobin A1C; Future    Screening for cervical cancer  Discussed with patient.  Orders:  •  Ambulatory referral to Obstetrics / Gynecology; Future    Screen for colon cancer  Discussed with patient.  Orders:  •  Ambulatory referral to Gastroenterology; Future    Asymptomatic postmenopausal state  Discussed with patient.  Orders:  •  DXA bone density spine hip and pelvis; Future    Need for vaccination  Discussed with patient.  And to consider getting the PCV 20 vaccine as well secondary to her history of hypertension.  Orders:  •  tetanus-diphtheria-acellular pertussis (ADACEL) 5-2-15.5 LF-mcg/0.5 injection; Inject 0.5 mL into a muscle once for 1 dose        RTO 6 months and do labs 1 week before.  Patient to see me prior to that for anything else in between.        Preventive Screenings:  - Diabetes Screening: screening up-to-date  - Cholesterol Screening: screening not indicated and has hyperlipidemia   - Hepatitis C screening: screening up-to-date   - HIV screening: screening up-to-date   - Cervical cancer screening: has history of cervical cancer   - Breast cancer screening: screening up-to-date   - Colon cancer screening: screening up-to-date   - Lung cancer screening: screening not indicated     Immunizations:  - Immunizations due: Prevnar 20 and Tdap  - Risks/benefits  immunizations discussed      Counseling/Anticipatory Guidance:  - Alcohol: discussed moderation in alcohol intake and recommendations for healthy alcohol use.   - Drug use: discussed harms of illicit drug use and how it can negatively impact mental/physical health.   - Tobacco use: discussed harms of tobacco use and management options for quitting.   - Dental health: discussed importance of regular tooth brushing, flossing, and dental visits.   - Sexual health: discussed sexually transmitted diseases, partner selection, use of condoms, avoidance of unintended pregnancy, and contraceptive alternatives.   - Diet: discussed recommendations for a healthy/well-balanced diet.   - Exercise: the importance of regular exercise/physical activity was discussed. Recommend exercise 3-5 times per week for at least 30 minutes.   - Injury prevention: discussed safety/seat belts, safety helmets, smoke detectors, carbon monoxide detectors, and smoking near bedding or upholstery.       Depression Screening and Follow-up Plan: Patient was screened for depression during today's encounter. They screened negative with a PHQ-2 score of 0.          History of Present Illness     Adult Annual Physical:  Patient presents for annual physical. 54-year-old female here for her annual physical.  Patient also like to be evaluated for her hypertension, lipids and sugar.  Patient did labs recently.  Patient denies tobacco.  Patient asserts that she saw her GYN recently.  She is up-to-date with her mammogram.  Patient is not sure when she is due for her next colonoscopy though she had one before and she had 1 polyp as per patient.  Patient is due for her DEXA.  Patient due for the PCV 20 vaccine and Tdap vaccine.  No history of an adverse reaction to vaccines in the past.  Patient asserts that she is nervous about the blood work and hence her blood pressure was elevated upon arrival.  When I rechecked it afterwards it was slightly better.  Patient  "denies any acute cardiovascular or neurosymptoms from it..     Diet and Physical Activity:  - Diet/Nutrition: well balanced diet.  - Exercise: walking and no formal exercise.    Depression Screening:  - PHQ-2 Score: 0    General Health:  - Sleep: sleeps well.  - Hearing: normal hearing bilateral ears.  - Vision: no vision problems.  - Dental: regular dental visits.    /GYN Health:  - Follows with GYN: yes.   - Menopause: postmenopausal.     Advanced Care Planning:  - Has an advanced directive?: no    - Has a durable medical POA?: no      Review of Systems   Constitutional:  Negative for activity change, appetite change, chills, fatigue, fever and unexpected weight change.   HENT:  Negative for congestion, hearing loss and sore throat.    Eyes:  Negative for visual disturbance.   Respiratory:  Negative for cough and shortness of breath.    Cardiovascular:  Negative for chest pain and palpitations.   Gastrointestinal:  Negative for abdominal pain, blood in stool, constipation, diarrhea, nausea and vomiting.   Genitourinary:  Negative for dysuria and hematuria.   Musculoskeletal:  Negative for arthralgias.   Skin:  Negative for rash.   Neurological:  Negative for dizziness and headaches.   Psychiatric/Behavioral:  Negative for dysphoric mood. The patient is nervous/anxious.          Objective   /70   Pulse 86   Temp 97.8 °F (36.6 °C) (Temporal)   Resp 16   Ht 5' 1\" (1.549 m)   Wt 76 kg (167 lb 9.6 oz)   SpO2 97%   BMI 31.67 kg/m²     Physical Exam  Vitals reviewed.   Constitutional:       General: She is not in acute distress.     Appearance: Normal appearance. She is obese. She is not ill-appearing.   HENT:      Head: Normocephalic and atraumatic.      Right Ear: Tympanic membrane, ear canal and external ear normal.      Left Ear: Tympanic membrane, ear canal and external ear normal.      Nose: Nose normal.      Mouth/Throat:      Mouth: Mucous membranes are moist.      Pharynx: Oropharynx is clear. "     Eyes:      Extraocular Movements: Extraocular movements intact.      Conjunctiva/sclera: Conjunctivae normal.     Neck:      Vascular: No carotid bruit.     Cardiovascular:      Rate and Rhythm: Normal rate and regular rhythm.   Pulmonary:      Effort: Pulmonary effort is normal.      Breath sounds: Normal breath sounds.   Abdominal:      Palpations: Abdomen is soft.      Tenderness: There is no abdominal tenderness. There is no right CVA tenderness or left CVA tenderness.     Musculoskeletal:         General: No tenderness.      Cervical back: Neck supple.      Right lower leg: No edema.      Left lower leg: No edema.      Comments: No calf tenderness bilateral.   Lymphadenopathy:      Cervical: No cervical adenopathy.     Skin:     General: Skin is warm.      Findings: No rash.     Neurological:      General: No focal deficit present.      Mental Status: She is alert and oriented to person, place, and time.     Psychiatric:         Mood and Affect: Mood normal.         Behavior: Behavior normal.         Thought Content: Thought content normal.

## 2025-07-24 NOTE — ASSESSMENT & PLAN NOTE
Controlled.  LFTs normal.  Total cholesterol went down to 191 now when it was 202 in February of this year, triglycerides went up to 163 from 110.  Her HDL went down to 48 from 23 and her LDL went down to 110 from 127.  Advised patient to have less starches sweets and fats.  Lose weight with a better diet and exercise.  Recheck labs again in 6 months.  Orders:  •  Lipid panel; Future  •  Comprehensive metabolic panel; Future   Tarsorrhaphy Text: A tarsorrhaphy was performed using Frost sutures.

## 2025-07-24 NOTE — ASSESSMENT & PLAN NOTE
Obesity with a BMI of 31.67.  Advised patient to lose weight with a better diet and exercise.  Labs above reviewed.  Continue to monitor follow-up.

## 2025-07-24 NOTE — PATIENT INSTRUCTIONS
"Patient Education     Routine physical for adults   The Basics   Written by the doctors and editors at [facility], [place]   What is a physical? -- A physical is a routine visit, or \"check-up,\" with your doctor. You might also hear it called a \"wellness visit\" or \"preventive visit.\"  During each visit, the doctor will:   Ask about your physical and mental health   Ask about your habits, behaviors, and lifestyle   Do an exam   Give you vaccines if needed   Talk to you about any medicines you take   Give advice about your health   Answer your questions  Getting regular check-ups is an important part of taking care of your health. It can help your doctor find and treat any problems you have. But it's also important for preventing health problems.  A routine physical is different from a \"sick visit.\" A sick visit is when you see a doctor because of a health concern or problem. Since physicals are scheduled ahead of time, you can think about what you want to ask the doctor.  How often should I get a physical? -- It depends on your age and health. In general, for people age 21 years and older:   If you are younger than 50 years, you might be able to get a physical every 3 years.   If you are 50 years or older, your doctor might recommend a physical every year.  If you have an ongoing health condition, like diabetes or high blood pressure, your doctor will probably want to see you more often.  What happens during a physical? -- In general, each visit will include:   Physical exam - The doctor or nurse will check your height, weight, heart rate, and blood pressure. They will also look at your eyes and ears. They will ask about how you are feeling and whether you have any symptoms that bother you.   Medicines - It's a good idea to bring a list of all the medicines you take to each doctor visit. Your doctor will talk to you about your medicines and answer any questions. Tell them if you are having any side effects that bother you. You " "should also tell them if you are having trouble paying for any of your medicines.   Habits and behaviors - This includes:   Your diet   Your exercise habits   Whether you smoke, drink alcohol, or use drugs   Whether you are sexually active   Whether you feel safe at home  Your doctor will talk to you about things you can do to improve your health and lower your risk of health problems. They will also offer help and support. For example, if you want to quit smoking, they can give you advice and might prescribe medicines. If you want to improve your diet or get more physical activity, they can help you with this, too.   Lab tests, if needed - The tests you get will depend on your age and situation. For example, your doctor might want to check your:   Cholesterol   Blood sugar   Iron level   Vaccines - The recommended vaccines will depend on your age, health, and what vaccines you already had. Vaccines are very important because they can prevent certain serious or deadly infections.   Discussion of screening - \"Screening\" means checking for diseases or other health problems before they cause symptoms. Your doctor can recommend screening based on your age, risk, and preferences. This might include tests to check for:   Cancer, such as breast, prostate, cervical, ovarian, colorectal, prostate, lung, or skin cancer   Sexually transmitted infections, such as chlamydia and gonorrhea   Mental health conditions like depression and anxiety  Your doctor will talk to you about the different types of screening tests. They can help you decide which screenings to have. They can also explain what the results might mean.   Answering questions - The physical is a good time to ask the doctor or nurse questions about your health. If needed, they can refer you to other doctors or specialists, too.  Adults older than 65 years often need other care, too. As you get older, your doctor will talk to you about:   How to prevent falling at " home   Hearing or vision tests   Memory testing   How to take your medicines safely   Making sure that you have the help and support you need at home  All topics are updated as new evidence becomes available and our peer review process is complete.  This topic retrieved from [employer] on: May 02, 2024.  Topic 619769 Version 1.0  Release: 32.4.3 - C32.122  © 2024 UpToDate, Inc. and/or its affiliates. All rights reserved.  Consumer Information Use and Disclaimer   Disclaimer: This generalized information is a limited summary of diagnosis, treatment, and/or medication information. It is not meant to be comprehensive and should be used as a tool to help the user understand and/or assess potential diagnostic and treatment options. It does NOT include all information about conditions, treatments, medications, side effects, or risks that may apply to a specific patient. It is not intended to be medical advice or a substitute for the medical advice, diagnosis, or treatment of a health care provider based on the health care provider's examination and assessment of a patient's specific and unique circumstances. Patients must speak with a health care provider for complete information about their health, medical questions, and treatment options, including any risks or benefits regarding use of medications. This information does not endorse any treatments or medications as safe, effective, or approved for treating a specific patient. UpToDate, Inc. and its affiliates disclaim any warranty or liability relating to this information or the use thereof.The use of this information is governed by the Terms of Use, available at https://www.[employer]uwer.com/en/know/clinical-effectiveness-terms. 2024© UpToDate, Inc. and its affiliates and/or licensors. All rights reserved.  Copyright   © 2024 UpToDate, Inc. and/or its affiliates. All rights reserved.

## 2025-07-24 NOTE — PROGRESS NOTES
Name: Sharyn Oakes      : 1970      MRN: [id number]  Encounter Provider: Bin Dominguez DPM  Encounter Date: 2025   Encounter department: [facility] [place]    Explained to patient that the cramping in the right foot is due to her malaligned right second toe but also  neuritis in the foot.  As she becomes too drowsy with the gabapentin at 300 mg, prescribed gabapentin 100 mg at bedtime and urged her to take it on a regular basis.     The patient does have work restrictions and these restrictions have been helpful for her.  Advised her to continue with these restrictions.  Reappoint 3 months.  :  Assessment & Plan  Peripheral nerve disorder    Orders:    gabapentin (Neurontin) 100 mg capsule; Take 1 capsule (100 mg total) by mouth daily at bedtime    Hammer toe of right foot             History of Present Illness   HPI  Sharyn Oakes is a 54 y.o. female who presents for assessment of her right foot.  Patient has a partially dislocated hammertoe with ligament damage at the level of the second MPJ.  She taped her toe into proper alignment.  For the most part, she is doing well but does have.'s of discomfort when she relates cramping in the toe and in the forefoot.  This can occur when walking or when trying to rest.    The patient has a prescription for gabapentin 300 mg at bedtime which she rarely takes it.  She notes that it makes her drowsy.  She only takes the medication if she is having the foot discomfort from cramping.      Review of Systems       Objective   There were no vitals taken for this visit.     Physical Exam    Musculoskeletal:         General: Deformity present.      Comments: Partially dislocated hammertoe deformity right second toe with ligament instability at the MPJ.  No pain with palpation right forefoot.     Skin:     General: Skin is warm.     Neurological:      General: No focal deficit present.      Mental Status: She is oriented to person, place, and time.

## 2025-07-24 NOTE — ASSESSMENT & PLAN NOTE
Regarding her annual physical, patient is given age and diagnosis appropriate evaluation and care.  Patient's recent labs were discussed with patient.  Patient is ordered more labs for her next visit Which also includes a screening test for diabetes as discussed with patient and with her permission.  Patient advised to lose weight with a better diet and exercise.  Get her mammogram done when scheduled later on this year.  Patient given a GI referral for her next colonoscopy.  Patient is given an order for bone density test.  Also advised PCV 20 vaccine and the Tdap vaccine.  Advised calcium vitamin D, vitamin D3, multivitamin.  Advise routine self skin checks and use of sunscreen.  Also advised monthly self breast exams.    Orders:  •  Lipid panel; Future  •  Comprehensive metabolic panel; Future  •  UA w Reflex to Microscopic w Reflex to Culture; Future

## 2025-07-24 NOTE — ASSESSMENT & PLAN NOTE
Controlled.  It does go up when she gets anxious.  Patient asserts that she does not take any medication for it.  Is diet controlled.  Advised patient have a low-sodium diet.  Decrease her anxiety if/when possible.  To see if the better diet and exercise.  Hydrate well.  Continue to monitor and follow-up.  Orders:  •  Comprehensive metabolic panel; Future  •  UA w Reflex to Microscopic w Reflex to Culture; Future

## (undated) DEVICE — CHLORAPREP HI-LITE 26ML ORANGE

## (undated) DEVICE — BETHLEHEM UNIVERSAL  MIONR EXT: Brand: CARDINAL HEALTH

## (undated) DEVICE — STOCKINETTE 2P PREROLLD 6X60

## (undated) DEVICE — SPONGE PVP SCRUB WING STERILE

## (undated) DEVICE — SUT ETHILON 4-0 PS-2 18 IN 1667H

## (undated) DEVICE — CURITY NON-ADHERENT STRIPS: Brand: CURITY

## (undated) DEVICE — DISPOSABLE OR TOWEL: Brand: CARDINAL HEALTH

## (undated) DEVICE — GLOVE INDICATOR PI UNDERGLOVE SZ 8.5 BLUE

## (undated) DEVICE — SYRINGE 10ML LL

## (undated) DEVICE — SUT VICRYL 3-0 SH 27 IN J416H

## (undated) DEVICE — NEEDLE 25G X 1 1/2

## (undated) DEVICE — INTENDED FOR TISSUE SEPARATION, AND OTHER PROCEDURES THAT REQUIRE A SHARP SURGICAL BLADE TO PUNCTURE OR CUT.: Brand: BARD-PARKER SAFETY BLADES SIZE 11, STERILE

## (undated) DEVICE — ACE WRAP 4 IN UNSTERILE

## (undated) DEVICE — TUBING ARTHROSCOPIC WAVE  MAIN PUMP

## (undated) DEVICE — ACE WRAP 6 IN UNSTERILE

## (undated) DEVICE — GLOVE INDICATOR PI UNDERGLOVE SZ 7.5 BLUE

## (undated) DEVICE — INVIEW CLEAR LEGGINGS: Brand: CONVERTORS

## (undated) DEVICE — PADDING CAST 6IN COTTON STRL

## (undated) DEVICE — STERILE POLYISOPRENE POWDER-FREE SURGICAL GLOVES: Brand: PROTEXIS

## (undated) DEVICE — ARTHROSCOPY FLOOR MAT

## (undated) DEVICE — GAUZE SPONGES,16 PLY: Brand: CURITY

## (undated) DEVICE — SINGLE PORT MANIFOLD: Brand: NEPTUNE 2

## (undated) DEVICE — K-WIRE COVERS, STERILE, WHITE, .043-INCH, 1.1MM, 2 PER PACKAGE: Brand: KEY SURGICAL K-WIRE AND PIN COVERS

## (undated) DEVICE — GLOVE SRG BIOGEL ORTHOPEDIC 8.5

## (undated) DEVICE — SCD SEQUENTIAL COMPRESSION COMFORT SLEEVE MEDIUM KNEE LENGTH: Brand: KENDALL SCD

## (undated) DEVICE — INTENDED FOR TISSUE SEPARATION, AND OTHER PROCEDURES THAT REQUIRE A SHARP SURGICAL BLADE TO PUNCTURE OR CUT.: Brand: BARD-PARKER ® SAFETYLOCK CARBON RIB-BACK BLADES

## (undated) DEVICE — LIGHT GLOVE GREEN

## (undated) DEVICE — NEEDLE BLUNT 18 G X 1 1/2IN

## (undated) DEVICE — K-WIRE TROCAR POINT BOTH ENDS .045                                    X 4: Type: IMPLANTABLE DEVICE | Status: NON-FUNCTIONAL

## (undated) DEVICE — CAST PADDING 4 IN SYNTHETIC NON-STRL

## (undated) DEVICE — WIRE 0.86MM TROCAR TIP
Type: IMPLANTABLE DEVICE | Site: FOOT | Status: NON-FUNCTIONAL
Removed: 2022-12-23

## (undated) DEVICE — STRETCH BANDAGE: Brand: CURITY

## (undated) DEVICE — VAPR COOLPULSE 90 ELECTRODE 90 DEGREES SUCTION WITH INTEGRATED HANDPIECE: Brand: VAPR COOLPULSE

## (undated) DEVICE — SUT VICRYL 4-0 PS-2 18 IN J496G

## (undated) DEVICE — 22.5 MM X 6.9 MM X 0.53 MM SAGITTAL BLADE

## (undated) DEVICE — SUT ETHILON 3-0 FS-1 18 IN 663G

## (undated) DEVICE — PENCIL ELECTROSURG E-Z CLEAN -0035H

## (undated) DEVICE — DRILL BIT 2MM CALIBRATED

## (undated) DEVICE — CUFF TOURNIQUET 18 X 4 IN QUICK CONNECT DISP 1 BLADDER

## (undated) DEVICE — OCCLUSIVE GAUZE STRIP,3% BISMUTH TRIBROMOPHENATE IN PETROLATUM BLEND: Brand: XEROFORM

## (undated) DEVICE — 3M™ STERI-STRIP™ REINFORCED ADHESIVE SKIN CLOSURES, R1547, 1/2 IN X 4 IN (12 MM X 100 MM), 6 STRIPS/ENVELOPE: Brand: 3M™ STERI-STRIP™

## (undated) DEVICE — U-DRAPE: Brand: CONVERTORS

## (undated) DEVICE — SYRINGE 5ML LL

## (undated) DEVICE — GLOVE SRG BIOGEL 7.5

## (undated) DEVICE — IMPERVIOUS STOCKINETTE: Brand: DEROYAL

## (undated) DEVICE — INTENDED FOR TISSUE SEPARATION, AND OTHER PROCEDURES THAT REQUIRE A SHARP SURGICAL BLADE TO PUNCTURE OR CUT.: Brand: BARD-PARKER ® CARBON RIB-BACK BLADES

## (undated) DEVICE — POV-IOD SOLUTION 4OZ BT

## (undated) DEVICE — BLADE SHAVER DISSECTOR 3.5MM 13CM COOLCUT

## (undated) DEVICE — TUBING SUCTION 5MM X 12 FT

## (undated) DEVICE — 3M™ DURAPORE™ SURGICAL TAPE 1538-3, 3 INCH X 10 YARD (7,5CM X 9,1M), 4 ROLLS/BOX: Brand: 3M™ DURAPORE™

## (undated) DEVICE — PADDING CAST 4 IN  COTTON STRL

## (undated) DEVICE — NEEDLE 18 G X 1 1/2

## (undated) DEVICE — DRAPE BILATERAL EXTEMITY

## (undated) DEVICE — SYRINGE 30ML LL

## (undated) DEVICE — STANDARD SURGICAL GOWN, L: Brand: CONVERTORS

## (undated) DEVICE — CUFF TOURNIQUET 30 X 4 IN QUICK CONNECT DISP 1BLA

## (undated) DEVICE — ABDOMINAL PAD: Brand: DERMACEA

## (undated) DEVICE — SUT PDS II 2-0 SH 27 IN Z317H

## (undated) DEVICE — INTENDED FOR TISSUE SEPARATION, AND OTHER PROCEDURES THAT REQUIRE A SHARP SURGICAL BLADE TO PUNCTURE OR CUT.: Brand: BARD-PARKER SAFETY BLADES SIZE 15, STERILE

## (undated) DEVICE — BETHLEHEM UNIVERSAL  ARTHRO PK: Brand: CARDINAL HEALTH

## (undated) DEVICE — 10FR FRAZIER SUCTION HANDLE: Brand: CARDINAL HEALTH